# Patient Record
Sex: FEMALE | Race: WHITE | NOT HISPANIC OR LATINO | Employment: FULL TIME | URBAN - METROPOLITAN AREA
[De-identification: names, ages, dates, MRNs, and addresses within clinical notes are randomized per-mention and may not be internally consistent; named-entity substitution may affect disease eponyms.]

---

## 2017-03-09 LAB
CHOLEST SERPL-MCNC: 196 MG/DL
HDLC SERPL-MCNC: 69 MG/DL (ref 35–60)
LDLC SERPL CALC-MCNC: 108 MG/DL
TRIGL SERPL-MCNC: 47 MG/DL (ref 30–150)

## 2017-09-06 LAB
BUN SERPL-MCNC: 10 MG/DL (ref 7–18)
CALCIUM SERPL-MCNC: 9.6 MG/DL (ref 9–10.9)
CHLORIDE SERPL-SCNC: 108 MMOL/L (ref 98–116)
CO2 SERPL-SCNC: 26 MMOL/L (ref 15–28)
CREAT SERPL-MCNC: 0.72 MG/DL (ref 0.6–1.3)
GLUCOSE SERPL-MCNC: 98 MG/DL (ref 74–106)
POTASSIUM SERPL-SCNC: 4.9 MMOL/L (ref 3.5–5.1)
SODIUM SERPL-SCNC: 141 MEQ/L (ref 131–145)

## 2018-03-12 ENCOUNTER — HISTORICAL (OUTPATIENT)
Dept: LAB | Facility: HOSPITAL | Age: 58
End: 2018-03-12

## 2018-03-12 ENCOUNTER — HISTORICAL (OUTPATIENT)
Dept: ADMINISTRATIVE | Facility: HOSPITAL | Age: 58
End: 2018-03-12

## 2018-03-12 LAB
ABS NEUT (OLG): 3.1
ALBUMIN SERPL-MCNC: 4.6 GM/DL (ref 3.4–5)
ALBUMIN/GLOB SERPL: 2.27 {RATIO} (ref 1.5–2.5)
ALP SERPL-CCNC: 56 UNIT/L (ref 38–126)
ALT SERPL-CCNC: 13 UNIT/L (ref 7–52)
APPEARANCE, UA: NORMAL
AST SERPL-CCNC: 19 UNIT/L (ref 15–37)
BACTERIA #/AREA URNS AUTO: NORMAL /HPF
BILIRUB SERPL-MCNC: 0.6 MG/DL (ref 0.2–1)
BILIRUB UR QL STRIP: NEGATIVE MG/DL
BILIRUBIN DIRECT+TOT PNL SERPL-MCNC: <0.2 MG/DL (ref 0–0.5)
BUN SERPL-MCNC: 13 MG/DL (ref 7–18)
CALCIUM SERPL-MCNC: 9 MG/DL (ref 8.5–10)
CHLORIDE SERPL-SCNC: 106 MMOL/L (ref 98–107)
CHOLEST SERPL-MCNC: 207 MG/DL (ref 0–200)
CHOLEST/HDLC SERPL: 3.2 {RATIO}
CO2 SERPL-SCNC: 28 MMOL/L (ref 21–32)
COLOR UR: YELLOW
CREAT SERPL-MCNC: 0.85 MG/DL (ref 0.6–1.3)
CREAT/UREA NIT SERPL: 15.3
ERYTHROCYTE [DISTWIDTH] IN BLOOD BY AUTOMATED COUNT: 13.2 % (ref 11.5–17)
GGT SERPL-CCNC: 12 UNIT/L (ref 5–85)
GLOBULIN SER-MCNC: 2 GM/DL (ref 1.2–3)
GLUCOSE (UA): NEGATIVE MG/DL
GLUCOSE SERPL-MCNC: 92 MG/DL (ref 74–106)
HCT VFR BLD AUTO: 41.9 % (ref 37–47)
HCV AB SERPL QL IA: NEGATIVE
HDLC SERPL-MCNC: 65 MG/DL (ref 35–60)
HGB BLD-MCNC: 14.1 GM/DL (ref 12–16)
HGB UR QL STRIP: NEGATIVE UNIT/L
KETONES UR QL STRIP: NEGATIVE MG/DL
LDH SERPL-CCNC: 113 UNIT/L (ref 140–271)
LDLC SERPL CALC-MCNC: 115 MG/DL (ref 0–129)
LEUKOCYTE ESTERASE UR QL STRIP: NEGATIVE UNIT/L
LYMPHOCYTES # BLD AUTO: 1.9 X10(3)/MCL (ref 0.6–3.4)
LYMPHOCYTES NFR BLD AUTO: 33.8 % (ref 13–40)
MCH RBC QN AUTO: 31.3 PG (ref 27–31.2)
MCHC RBC AUTO-ENTMCNC: 34 GM/DL (ref 32–36)
MCV RBC AUTO: 93 FL (ref 80–94)
MONOCYTES # BLD AUTO: 0.5 X10(3)/MCL (ref 0–1.8)
MONOCYTES NFR BLD AUTO: 8.7 % (ref 0.1–24)
NEUTROPHILS NFR BLD AUTO: 57.5 % (ref 47–80)
NITRITE UR QL STRIP.AUTO: NEGATIVE
PH UR STRIP: 5 [PH]
PLATELET # BLD AUTO: 279 X10(3)/MCL (ref 130–400)
PMV BLD AUTO: 9.1 FL
POTASSIUM SERPL-SCNC: 4.5 MMOL/L (ref 3.5–5.1)
PROT SERPL-MCNC: 6.7 GM/DL (ref 6.4–8.2)
PROT UR QL STRIP: NEGATIVE MG/DL
RBC # BLD AUTO: 4.5 X10(6)/MCL (ref 4.2–5.4)
RBC #/AREA URNS HPF: NORMAL /HPF
SODIUM SERPL-SCNC: 140 MMOL/L (ref 136–145)
SP GR UR STRIP: 1.01
SQUAMOUS EPITHELIAL, UA: NORMAL /LPF
TRIGL SERPL-MCNC: 61 MG/DL (ref 30–150)
TSH SERPL-ACNC: 0.93 MIU/ML (ref 0.35–4.94)
UROBILINOGEN UR STRIP-ACNC: 0.2 MG/DL
VLDLC SERPL CALC-MCNC: 12.2 MG/DL
WBC # SPEC AUTO: 5.5 X10(3)/MCL (ref 4.5–11.5)
WBC #/AREA URNS AUTO: NORMAL /[HPF]

## 2018-05-14 ENCOUNTER — TELEPHONE (OUTPATIENT)
Dept: TRANSPLANT | Facility: CLINIC | Age: 58
End: 2018-05-14

## 2018-05-14 NOTE — TELEPHONE ENCOUNTER
Thea Pedrito called and stated that she is interested in becoming a living donor for her nephew, Figueroa Lomeli.  Medical and social history obtained.  No contraindications noted.  All questions answered. HLA kit requested and education book emailed to patient for review.

## 2018-05-24 ENCOUNTER — TELEPHONE (OUTPATIENT)
Dept: TRANSPLANT | Facility: CLINIC | Age: 58
End: 2018-05-24

## 2018-05-24 DIAGNOSIS — Z00.5 TRANSPLANT DONOR EVALUATION: Primary | ICD-10-CM

## 2018-05-24 NOTE — TELEPHONE ENCOUNTER
HLA kit received, instructions provided. Patient will contact her doctor's office and let me know when she will have her blood drawn.

## 2018-05-30 ENCOUNTER — LAB VISIT (OUTPATIENT)
Dept: LAB | Facility: HOSPITAL | Age: 58
End: 2018-05-30
Payer: OTHER GOVERNMENT

## 2018-05-30 DIAGNOSIS — Z00.5 TRANSPLANT DONOR EVALUATION: ICD-10-CM

## 2018-05-30 LAB
ABO GROUP BLD: NORMAL
RH BLD: NORMAL

## 2018-05-30 PROCEDURE — 81373 HLA I TYPING 1 LOCUS LR: CPT | Mod: 91,PO,TXP

## 2018-05-30 PROCEDURE — 81373 HLA I TYPING 1 LOCUS LR: CPT | Mod: PO,TXP

## 2018-05-30 PROCEDURE — 81376 HLA II TYPING 1 LOCUS LR: CPT | Mod: 91,PO,TXP

## 2018-05-30 PROCEDURE — 36415 COLL VENOUS BLD VENIPUNCTURE: CPT | Mod: TXP

## 2018-05-30 PROCEDURE — 81376 HLA II TYPING 1 LOCUS LR: CPT | Mod: 59,PO,TXP

## 2018-05-30 PROCEDURE — 86901 BLOOD TYPING SEROLOGIC RH(D): CPT | Mod: TXP

## 2018-05-30 PROCEDURE — 86900 BLOOD TYPING SEROLOGIC ABO: CPT | Mod: 91,TXP

## 2018-05-30 PROCEDURE — 86901 BLOOD TYPING SEROLOGIC RH(D): CPT | Mod: 91,TXP

## 2018-06-01 LAB — HLATY INTERPRETATION: NORMAL

## 2018-06-06 ENCOUNTER — TELEPHONE (OUTPATIENT)
Dept: TRANSPLANT | Facility: CLINIC | Age: 58
End: 2018-06-06

## 2018-06-06 DIAGNOSIS — Z00.5 TRANSPLANT DONOR EVALUATION: Primary | ICD-10-CM

## 2018-06-06 LAB
HLA DRB4 1: NORMAL
HLA SSO DNA TYPING CLASS I & II INTERPRETATION: NORMAL
HLA-A 1 SERO. EQUIV: 29
HLA-A 1: NORMAL
HLA-A 2 SERO. EQUIV: 31
HLA-A 2: NORMAL
HLA-B 1 SERO. EQUIV: 44
HLA-B 1: NORMAL
HLA-B 2 SERO. EQUIV: 55
HLA-B 2: NORMAL
HLA-BW 1 SERO. EQUIV: 4
HLA-BW 2 SERO. EQUIV: 6
HLA-C 1: NORMAL
HLA-C 2: NORMAL
HLA-CW 1 SERO. EQUIV: 1
HLA-CW 2 SERO. EQUIV: 16
HLA-DQ 1 SERO. EQUIV: 2
HLA-DQ 2 SERO. EQUIV: 6
HLA-DQB1 1: NORMAL
HLA-DQB1 2: NORMAL
HLA-DRB1 1 SERO. EQUIV: 7
HLA-DRB1 1: NORMAL
HLA-DRB1 2 SERO. EQUIV: 13
HLA-DRB1 2: NORMAL
HLA-DRB3 1: NORMAL
HLA-DRB3 2: NORMAL
HLA-DRB345 1 SERO. EQUIV: 52
HLA-DRB345 2 SERO. EQUIV: 53
HLA-DRB4 2: NORMAL
HLA-DRB5 1: NORMAL
HLA-DRB5 2: NORMAL
SSDQB TESTING DATE: NORMAL
SSOA TESTING DATE: NORMAL
TYSSO TESTING DATE: NORMAL

## 2018-06-06 NOTE — TELEPHONE ENCOUNTER
Patient notified that the result of the crossmatch with her nephew shows that they are compatible. Patient would like to proceed with the medical evaluation. Required testing discussed and information provided to schedule appointments.   Patient will have recent mammogram, pap smear, and physical exam faxed to us from her PCP. Understands that she will have to have a colonoscopy prior to scheduling surgery.   All questions were answered and patient verbalized understanding.

## 2018-06-26 ENCOUNTER — HOSPITAL ENCOUNTER (OUTPATIENT)
Dept: CARDIOLOGY | Facility: CLINIC | Age: 58
Discharge: HOME | End: 2018-06-26
Attending: NURSE PRACTITIONER
Payer: OTHER GOVERNMENT

## 2018-06-26 ENCOUNTER — HOSPITAL ENCOUNTER (OUTPATIENT)
Dept: RADIOLOGY | Facility: HOSPITAL | Age: 58
Discharge: HOME | End: 2018-06-26
Attending: NURSE PRACTITIONER
Payer: OTHER GOVERNMENT

## 2018-06-26 DIAGNOSIS — Z00.5 TRANSPLANT DONOR EVALUATION: ICD-10-CM

## 2018-06-26 LAB
DIASTOLIC DYSFUNCTION: NO
RETIRED EF AND QEF - SEE NOTES: 60 (ref 55–65)
TRICUSPID VALVE REGURGITATION: NORMAL

## 2018-06-26 PROCEDURE — A9562 TC99M MERTIATIDE: HCPCS | Mod: TXP

## 2018-06-26 PROCEDURE — 71046 X-RAY EXAM CHEST 2 VIEWS: CPT | Mod: 26,TXP,, | Performed by: RADIOLOGY

## 2018-06-26 PROCEDURE — 93321 DOPPLER ECHO F-UP/LMTD STD: CPT | Mod: 26,S$PBB,TXP, | Performed by: INTERNAL MEDICINE

## 2018-06-26 PROCEDURE — 78707 K FLOW/FUNCT IMAGE W/O DRUG: CPT | Mod: 26,TXP,, | Performed by: RADIOLOGY

## 2018-06-26 PROCEDURE — 71046 X-RAY EXAM CHEST 2 VIEWS: CPT | Mod: TC,FY,TXP

## 2018-06-26 PROCEDURE — 93351 STRESS TTE COMPLETE: CPT | Mod: PBBFAC,TXP | Performed by: INTERNAL MEDICINE

## 2018-06-27 ENCOUNTER — HOSPITAL ENCOUNTER (OUTPATIENT)
Dept: RADIOLOGY | Facility: HOSPITAL | Age: 58
Discharge: HOME | End: 2018-06-27
Attending: INTERNAL MEDICINE
Payer: OTHER GOVERNMENT

## 2018-06-27 ENCOUNTER — HOSPITAL ENCOUNTER (OUTPATIENT)
Dept: RADIOLOGY | Facility: HOSPITAL | Age: 58
Discharge: HOME | End: 2018-06-27
Attending: TRANSPLANT SURGERY
Payer: OTHER GOVERNMENT

## 2018-06-27 ENCOUNTER — OFFICE VISIT (OUTPATIENT)
Dept: TRANSPLANT | Facility: CLINIC | Age: 58
End: 2018-06-27
Payer: OTHER GOVERNMENT

## 2018-06-27 VITALS
DIASTOLIC BLOOD PRESSURE: 65 MMHG | HEART RATE: 68 BPM | SYSTOLIC BLOOD PRESSURE: 117 MMHG | OXYGEN SATURATION: 99 % | RESPIRATION RATE: 16 BRPM | BODY MASS INDEX: 23.29 KG/M2 | HEIGHT: 64 IN | TEMPERATURE: 98 F | WEIGHT: 136.44 LBS

## 2018-06-27 DIAGNOSIS — F41.9 ANXIETY: ICD-10-CM

## 2018-06-27 DIAGNOSIS — Z00.5 TRANSPLANT DONOR EVALUATION: ICD-10-CM

## 2018-06-27 DIAGNOSIS — I10 ESSENTIAL HYPERTENSION: ICD-10-CM

## 2018-06-27 DIAGNOSIS — Z91.09 ENVIRONMENTAL ALLERGIES: ICD-10-CM

## 2018-06-27 DIAGNOSIS — Z00.5 WILLING TO BE KIDNEY DONOR: Primary | ICD-10-CM

## 2018-06-27 DIAGNOSIS — F19.90 CURRENT DRUG USE: ICD-10-CM

## 2018-06-27 PROCEDURE — 74174 CTA ABD&PLVS W/CONTRAST: CPT | Mod: TC,TXP

## 2018-06-27 PROCEDURE — 97802 MEDICAL NUTRITION INDIV IN: CPT | Mod: PBBFAC,TXP | Performed by: DIETITIAN, REGISTERED

## 2018-06-27 PROCEDURE — 99215 OFFICE O/P EST HI 40 MIN: CPT | Mod: S$PBB,TXP,, | Performed by: NURSE PRACTITIONER

## 2018-06-27 PROCEDURE — 71250 CT THORAX DX C-: CPT | Mod: TC,TXP

## 2018-06-27 PROCEDURE — 71250 CT THORAX DX C-: CPT | Mod: 26,TXP,, | Performed by: RADIOLOGY

## 2018-06-27 PROCEDURE — 74174 CTA ABD&PLVS W/CONTRAST: CPT | Mod: 26,TXP,, | Performed by: RADIOLOGY

## 2018-06-27 PROCEDURE — 99999 PR PBB SHADOW E&M-EST. PATIENT-LVL IV: CPT | Mod: PBBFAC,TXP,, | Performed by: NURSE PRACTITIONER

## 2018-06-27 PROCEDURE — 99214 OFFICE O/P EST MOD 30 MIN: CPT | Mod: PBBFAC,25,TXP | Performed by: NURSE PRACTITIONER

## 2018-06-27 PROCEDURE — 25500020 PHARM REV CODE 255: Mod: TXP | Performed by: TRANSPLANT SURGERY

## 2018-06-27 PROCEDURE — 99204 OFFICE O/P NEW MOD 45 MIN: CPT | Mod: S$PBB,TXP,, | Performed by: TRANSPLANT SURGERY

## 2018-06-27 RX ORDER — BUPROPION HYDROCHLORIDE 150 MG/1
150 TABLET ORAL NIGHTLY
COMMUNITY
End: 2023-04-27 | Stop reason: ALTCHOICE

## 2018-06-27 RX ORDER — MONTELUKAST SODIUM 10 MG/1
10 TABLET ORAL NIGHTLY
COMMUNITY
Start: 2015-02-24 | End: 2023-04-24 | Stop reason: SDUPTHER

## 2018-06-27 RX ORDER — AMLODIPINE BESYLATE 10 MG/1
10 TABLET ORAL NIGHTLY
COMMUNITY
Start: 2015-02-24 | End: 2022-10-25

## 2018-06-27 RX ORDER — ASPIRIN 81 MG/1
81 TABLET ORAL DAILY
COMMUNITY
End: 2018-11-27

## 2018-06-27 RX ORDER — LEVOCETIRIZINE DIHYDROCHLORIDE 5 MG/1
5 TABLET, FILM COATED ORAL NIGHTLY
COMMUNITY
End: 2023-04-24 | Stop reason: SDUPTHER

## 2018-06-27 RX ORDER — ALPRAZOLAM 0.5 MG/1
0.25 TABLET ORAL NIGHTLY PRN
COMMUNITY
End: 2022-10-25

## 2018-06-27 RX ADMIN — IOHEXOL 150 ML: 350 INJECTION, SOLUTION INTRAVENOUS at 01:06

## 2018-06-27 NOTE — PROGRESS NOTES
"TRANSPLANT NUTRITIONAL ASSESSMENT    Referring Provider: Roxanna Domínguez NP    Reason for Visit: Potential kidney donor (hx of HTN-low sodium diet education)    Age: 58 y.o.  Sex: female    Patient Active Problem List   Diagnosis    Willing to be kidney donor    Essential hypertension    Environmental allergies    Anxiety     Past Medical History:   Diagnosis Date    Allergy     Anxiety     Hypertension 2010    Shingles      Lab Results   Component Value Date    GLU 91 06/26/2018    K 3.8 06/26/2018    PHOS 3.6 06/26/2018    CHOL 192 06/26/2018    HDL 78 (H) 06/26/2018    TRIG 62 06/26/2018    ALBUMIN 4.2 06/26/2018    HGBA1C 5.2 06/26/2018    CALCIUM 9.6 06/26/2018       Current Outpatient Prescriptions   Medication Sig    ALPRAZolam (XANAX) 0.5 MG tablet Take 0.25 mg by mouth daily as needed for Anxiety.    amLODIPine (NORVASC) 10 MG tablet Take 10 mg by mouth once daily.    aspirin (ECOTRIN) 81 MG EC tablet Take 81 mg by mouth once daily.    buPROPion (WELLBUTRIN XL) 150 MG TB24 tablet Take 150 mg by mouth every evening.    levocetirizine (XYZAL) 5 MG tablet Take 5 mg by mouth every evening.    montelukast (SINGULAIR) 10 mg tablet Take 10 mg by mouth once daily.     No current facility-administered medications for this visit.      Allergies: Patient has no known allergies.    Ht Readings from Last 1 Encounters:   06/27/18 5' 4.49" (1.638 m)     Wt Readings from Last 1 Encounters:   06/27/18 61.9 kg (136 lb 7.4 oz)      BMI: Body mass index is 23.07 kg/m².    Current Diet: low sodium diet, lots of fruits and vegetables  Appetite/Current Intake: good   Exercise/Physical Activity: does circuit training and kickboxing 3-4 times/week  Nutritional/Herbal Supplements: none reported  Chewing/Swallowing Problems: none reported  Symptoms: none    Estimated Kcal Need: 1525-1830kcals/day (25-30kcals/kg BW)  Estimated Protein Need: 61gms protein/day (1gm/kg BW)    Nutritional History: Pt reports she has been " following a low sodium diet since being diagnosed with HTN.  Reports consuming fruits, vegetables, and salads regularly. Limits intake of processed foods and added salt.  Reads food labels for sodium content.  Dining out: approx 3 times per month and tries choose healthy options.  Pt is very active and does circuit training and kickboxing 3-4 times per week.        Educational Need? yes  Barriers: none identified  Discussed with: patient  Interventions: Patient taught nutrition information regarding   -Reviewed Low Sodium packet (low Na diet, foods recommended/not recommended, food label strategies, flavoring tips, & sample menu).   Goals/Recommendations: continue diet adherence and current exercise habits  Actions Taken: instruct/provide written information  Patient and/or family comprehend instructions: yes  Outcome: Verbalizes understanding  Monitoring: contact information provided, will follow-up as needed      Counseling Time: 15 minutes

## 2018-06-27 NOTE — PROGRESS NOTES
Transplant Surgery Kidney Donor Evaluation     Referring Physician:     Subjective:     Chief Complaint: Thea Major is a 58 y.o. year old female who presents today wishing to be evaluated as a potential living related donor for her nephew.    History of Present Illness:  Thea reports being here without coercion, payment, guilt, or other alternative motives other than wanting to help someone with kidney disease.    Review of Systems   Constitutional: Negative for activity change and chills.   HENT: Negative for congestion and sore throat.    Eyes: Negative for discharge and redness.   Respiratory: Negative for cough and shortness of breath.    Cardiovascular: Negative for chest pain and palpitations.   Gastrointestinal: Negative for nausea and vomiting.   Endocrine: Negative for polydipsia and polyuria.   Genitourinary: Negative for dysuria and frequency.   Musculoskeletal: Negative for arthralgias and gait problem.   Skin: Negative for pallor and rash.   Neurological: Negative for dizziness and light-headedness.   Hematological: Negative for adenopathy. Does not bruise/bleed easily.   Psychiatric/Behavioral: Negative for agitation and suicidal ideas.       Objective:   Physical Exam   Constitutional: She is oriented to person, place, and time. She appears well-developed and well-nourished. No distress.   Neck: Normal range of motion. Neck supple. No JVD present.   Cardiovascular: Normal rate and intact distal pulses.    Pulmonary/Chest: Effort normal. No respiratory distress.   Abdominal: Soft. She exhibits no distension and no mass. There is no tenderness. There is no rebound and no guarding.   Musculoskeletal: She exhibits no edema.   Neurological: She is alert and oriented to person, place, and time.   Skin: Skin is warm and dry. She is not diaphoretic.   Psychiatric: She has a normal mood and affect.     ABO type: O POS    Diagnoses:  No diagnosis found.         Transplant Surgery - Candidacy    Assessment/Plan:     Donor Candidacy: Based on information available thus far, Thea is a suitable candidate for living kidney donation.    Nadir Yeh MD       Education: I discussed with the patient the requirements for donation including the compatibility of blood and tissue typing, healthy by physical examination and workup, as well as the desire to donate.  We discussed the risks related to surgery including the risks related to anesthesia, bleeding, infection, inability for surgery to be performed laparoscopically, risks of reoperation as well as the risks of death.  We discussed the length of hospitalization, return to work times, as well as follow-up post-donation.    I also discussed the slight possibility that due to problems with the recipient operation, the transplant might not be able to be completed after the organ was already removed. If such a situation should arise, the donor prefers that the organ be transplanted into a suitable third-party recipient.    I discussed the possibility that living donor sometimes encounter problems obtaining health insurance, or could have higher premium despite ongoing efforts of transplant professionals to educate insurance companies on this issue.    I discussed with Thea that donation is a voluntary activity, and reiterated it should be done willingly and for altruistic reasons only.  I reviewed that no payment should be made for donating.  I also discussed that coersion, guilt, pressure, or feelings of obligation are not appropriate reasons to donate.  The option to withdraw at any time was emphasized.  Thea was reminded that a medical opt out can be given to protect her confidentiality, and no member of the transplant team will discuss specifics of her health or medical/social history with anyone else without permission.  The need for lifelong routine medical follow-up for optimal health, including routine health maintenance was reviewed.    Additionally, I  discussed the need for our program to be able to contact living donors for UNOS reporting purposes for a minimum of 2 years.  Failure of our center to be able to provide such information could jeopardize our ability to continue to offer living donor transplants.  Thea voices understanding and agrees to this follow-up.    I discussed the UNOS requirement for centers to report donor status for a minimum of two years. Thea understands that failure to comply with requirement could have adverse consequences for our transplant program, and agrees to cooperate with all our required follow-up.    I reviewed with Thea available lab results and other diagnostics from the evaluation process.

## 2018-06-27 NOTE — PROGRESS NOTES
PHARM.D. PRE-TRANSPLANT DONOR NOTE:    This patient's medication therapy was evaluated as part of her pre-transplant evaluation.    The following pharmacologic concerns were noted: none      Current Outpatient Prescriptions   Medication Sig Dispense Refill    ALPRAZolam (XANAX) 0.5 MG tablet Take 0.25 mg by mouth daily as needed for Anxiety.      amLODIPine (NORVASC) 10 MG tablet Take 10 mg by mouth once daily.      aspirin (ECOTRIN) 81 MG EC tablet Take 81 mg by mouth once daily.      buPROPion (WELLBUTRIN XL) 150 MG TB24 tablet Take 150 mg by mouth every evening.      levocetirizine (XYZAL) 5 MG tablet Take 5 mg by mouth every evening.      montelukast (SINGULAIR) 10 mg tablet Take 10 mg by mouth once daily.       No current facility-administered medications for this visit.          Currently she is responsible for preparing / administering this patient's medications on a daily basis.  I am available for consultation and can be contacted, as needed by the other members of the Kidney Transplant team.

## 2018-06-27 NOTE — PROGRESS NOTES
"DONOR TEACHING NOTE    Met with Thea Major today in clinic to review living donor education.        Topics covered included:  · Kidney donation is done voluntarily and of the donor's free will.  Process can stop at any time.   · Better success rates than cadaveric donation, shorter waiting time for the recipient: less than the 3-5 year wait on the list, more time to prepare: tests/surgery can be planned  · Laboratory studies of blood and urine.  Health exams: records of GYN/pap/mammogram (females); evaluation by transplant team and a psychiatrist (if indicated); diagnostic Tests: CXR, EKG, TB skin test, 24-hour urine collection, renal scan, CT of abdomen to assess kidney anatomy, and stress test (if indicated)  · Team will review workup for approval.  Copy of the approved criteria for donation given to patient.  Surgeon will decide which kidney will be donated.   · Benefits of laparoscopic nephrectomy: less pain, shorter hospital stay, a shorter recovery period.  Risks discussed: bleeding, deep vein thrombosis, pulmonary embolus, the need for re-operation.  Your operation may be converted to an "open" procedure if the surgeon feels it is medically necessary.  · To recovery room, transfer to TSU, if space allows or semi-private room.  Lord catheter/IV, average hospital stay is 1 day.  At discharge the cost of any prescriptions is the donor's responsibility.  · Post-operative visit 4 weeks after surgery or prior to returning to work, unless a complication arises and you need to be seen sooner.  Off of work for about 3 to 6 weeks, no driving for at least the first 3 weeks.  General surgical complications: infection, allergic reaction, and anesthesia.   · Long life considerations of living with one kidney: risk of HTN and kidney failure   · Following up with PCP 6 months after donation then yearly thereafter to monitor kidney function.  This should include weight, labs, urinalysis, and a blood pressure check.  We " are required to report your progress to UNOS at 6 months, 1 year, and 2 years post-donation.     Written educational material provided. Informed consent reviewed and signed. All questions were answered and patient verbalized understanding.     Patient is a suitable candidate for living kidney donation.

## 2018-06-27 NOTE — Clinical Note
June 28, 2018                     [address] [facility]  [address]  [facility] [address]  Phone: [phone]   Patient: Thea Major   MR Number: [id number]   YOB: 1960   Date of Visit: 6/27/2018       Dear      Thank you for referring Thea Major to me for evaluation. Attached you will find relevant portions of my assessment and plan of care.    If you have questions, please do not hesitate to call me. I look forward to following Thea Major along with you.    Sincerely,    Roxanna Domínguez, NP    Enclosure    If you would like to receive this communication electronically, please contact [email or web address] or [phone] to request [employer] access.    [employer] Link is a tool which provides read-only access to select patient information with whom you have a relationship. Its easy to use and provides real time access to review your patients record including encounter summaries, notes, results, and demographic information.    If you feel you have received this communication in error or would no longer like to receive these types of communications, please e-mail [email or web address]

## 2018-06-27 NOTE — PROGRESS NOTES
"   Kidney Transplant Donor Evaluation    Subjective:       CC:  Initial evaluation of kidney donor candidacy.    HPI:  Ms. Major is a 58 y.o. year old Unknown female who has presented to be evaluated as a potential living related donor for her nephew.  Ms. Major reports being here without coercion, payment, guilt or other alternative motives other than wanting to help someone with kidney disease.    Patient denies any history of coronary artery disease, stroke, seizure disorder, chronic obstructive pulmonary disease, liver disease, kidney stones, gallstones, deep venous thrombosis, pulmonary embolism, recurrent urinary tract infections or malignancies.      Pt reports occasional marijuana use , last use 2018  Has been on Wellbutrin since she quit smoking 2016. Takes Xanax 0.5 mg nightly    Breast Biopsy in  --benign, Gets yearly MMGs.     Takes ASA 81 mg daily per PCP  Discussed avoiding NSAID use post nephrectomy     Kick boxing and circuit training 3-4 x/week  Works full time    [employer]  Miscarriage  with her first and only  pregnancy, and had a D&C.      Past Medical History:   Diagnosis Date    Allergy     Anxiety     Hypertension 2010    Shingles       Past Surgical History:   Procedure Laterality Date    BREAST BIOPSY      benign    DILATION AND CURETTAGE OF UTERUS      TUBAL LIGATION          Social History   Substance Use Topics    Smoking status: Former Smoker     Packs/day: 1.00     Years: 30.00     Types: Cigarettes     Quit date: 2016    Smokeless tobacco: Never Used    Alcohol use Yes      Comment: 2 x/ month     Family History   Problem Relation Age of Onset    COPD Father     Lung disease Father     Tuberculosis Father     Aneurysm Mother      /80 (BP Location: Right arm, Patient Position: Sitting, BP Method: Medium (Automatic))   Pulse 87   Temp 98.1 °F (36.7 °C) (Oral)   Resp 16   Ht 5' 4.49" (1.638 m)   Wt 61.9 kg (136 lb 7.4 oz)   SpO2 " 99%   BMI 23.07 kg/m²     Review of Systems   Constitutional: Negative for activity change, appetite change, chills, fatigue, fever and unexpected weight change.   HENT: Negative for congestion, facial swelling, postnasal drip, rhinorrhea, sinus pressure, sore throat and trouble swallowing.    Eyes: Positive for visual disturbance. Negative for pain and redness.        Wears glasses   Respiratory: Negative for cough, chest tightness, shortness of breath and wheezing.    Cardiovascular: Negative.  Negative for chest pain, palpitations and leg swelling.   Gastrointestinal: Negative for abdominal pain, diarrhea, nausea and vomiting.   Genitourinary: Negative for dysuria, flank pain and urgency.   Musculoskeletal: Negative for gait problem, neck pain and neck stiffness.   Skin: Negative for rash.   Allergic/Immunologic: Positive for environmental allergies. Negative for food allergies and immunocompromised state.   Neurological: Negative for dizziness, weakness, light-headedness and headaches.   Psychiatric/Behavioral: Negative for agitation and confusion. The patient is nervous/anxious.         Controlled       Objective:   Physical Exam   Constitutional: She is oriented to person, place, and time. She appears well-developed and well-nourished.   HENT:   Head: Normocephalic.   Mouth/Throat: Oropharynx is clear and moist. No oropharyngeal exudate.   Eyes: Conjunctivae and EOM are normal. Pupils are equal, round, and reactive to light. No scleral icterus.   Neck: Normal range of motion. Neck supple.   Cardiovascular: Normal rate, regular rhythm and normal heart sounds.    Pulmonary/Chest: Effort normal and breath sounds normal.   Abdominal: Soft. Normal appearance and bowel sounds are normal. She exhibits no distension and no mass. There is no splenomegaly or hepatomegaly. There is no tenderness. There is no rebound, no guarding, no CVA tenderness, no tenderness at McBurney's point and negative Garber's sign.    Musculoskeletal: Normal range of motion. She exhibits no edema.   Lymphadenopathy:     She has no cervical adenopathy.   Neurological: She is alert and oriented to person, place, and time. She exhibits normal muscle tone. Coordination normal.   Skin: Skin is warm and dry.   Psychiatric: She has a normal mood and affect. Her behavior is normal.   Vitals reviewed.      Labs:  6/26/2018: Creatinine 0.8 mg/dL (Ref range: 0.5 - 1.4 mg/dL); Creatinine, Random Ur 78.0 mg/dL (Ref range: 15.0 - 325.0 mg/dL); Urine, Creatinine 44.0 mg/dL (Ref range: 15.0 - 325.0 mg/dL); BUN, Bld 14 mg/dL (Ref range: 6 - 20 mg/dL)  6/26/2018: Nitrite, UA Negative (Ref range: Negative)  ABO type: O POS    Assessment:     1. Willing to be kidney donor    2. Essential hypertension    3. Environmental allergies    4. Anxiety    5. Current drug use        Plan:   Body mass index is 23.07 kg/m².  HTN controlled    Continue smoking cessation  + marijuana use--encouraged cessation of use at least  8 weeks p/t nephrectomy  Plasma toxicology testing today and random , as well as,  and nicotine testing      Chest CT ordered today -->per CXR , Lungs a clear which could be slightly emphysematous.       Donor Candidacy:   Based on the given information, Ms. Major appears to be a suitable candidate for kidney donation.  A final recommendation will be made by the selection committee after reviewing her complete workup.    Roxanna Domínguez NP       Counseling:   I discussed with Ms. Major that donation is voluntary and reiterated it should be done willingly and for altruistic reasons only.  I reviewed that no payment should be received for donating.  I also discussed that coercion, guilt, pressure, or feelings of obligation are not appropriate reasons to donate.  The option to withdraw at any time was emphasized.  Ms. Major was reminded that a medical opt out can be given to protect her confidentiality, and no member of the transplant team will  discuss specifics of her health or medical/social history with anyone else without permission.  The need for lifelong routine medical follow-up for optimal health, including routine health maintenance was reviewed.    We also discussed the long term risks associated with kidney donation.  I told the patient that her GFR should return to within 75-80% of pre-donation level within six months of donation.  I informed the patient that there is a small risk of developing albuminuria and hypertension following donor nephrectomy.  I also informed the patient that based on current literature, the risk of developing end-stage renal disease following donor nephrectomy is similar to the general population.    I reviewed with Ms. Major available lab results and other diagnostics from the evaluation process    Additional Counseling:   The patient was counseled on the need for regular follow-up with a primary care physician for blood pressure and cholesterol screening.  The importance of age appropriate health screening was also emphasized.    Follow-up:    prn    Altogether, 30  minutes of this encounter were spent on counseling, which was greater than 50% of the total visit time.

## 2018-06-27 NOTE — PROGRESS NOTES
TRANSPLANT DONOR PSYCHOSOCIAL ASSESSMENT    Thea Major  [address]  [place] [address]    Telephone Information:   Mobile [phone]     Home There is no home phone number on file.  Work  There is no work phone number on file.  E-mail  No e-mail address on record    PHS Increased Risk Behavioral Questionnaire reviewed by : Yes   addressed any PHS increased risk behaviors or concerns. Resources and education provided as appropriate.    Sex: female  YOB: 1960  Age: 58 y.o.    Encounter Date: 6/27/2018  [place] Citizen: yes  Primary Language: English   Needed: no    Potential Recipient: Lucius Ontiveros   Clinic Number: [id number]  Donor's Relationship to Patient: nephew    Emergency Contact:  Name: Rosalinda Major   Relationship: sister  Address: [address]  Phone Numbers:   [phone] (mobile)    Family/Social Support:   Number of dependents/: pt reports no dependents   Marital history: pt reports 1 previous marriage, ending in divorce and denies current relationship  Other family dynamics: pt reports     Household Composition:  Pt lives with a roommate      Do you and your caregivers have access to reliable transportation? yes  PRIMARY CAREGIVER: Rosalinda Major will be primary caregiver, phone number [phone].      provided in-depth information to patient and caregiver regarding pre- and post-transplant caregiver role.   strongly encourages patient and caregiver to have concrete plan regarding post-transplant care giving, including back-up caregiver(s) to ensure care giving needs are met as needed.    Patient and Caregiver states understanding all aspects of caregiver role/commitment and is able/willing/committed to being caregiver to the fullest extent necessary.    Patient and Caregiver verbalizes understanding of the education provided today and caregiver responsibilities.         remains  available. Patient and Caregiver agree to contact  in a timely manner if concerns arise.      Able to take time off work without financial concerns: yes.       Pt's caregiver exited room at this time.     Additional Significant Others who will Assist with Transplant:  Name: Luz Adkins   Age: 48  Number: [phone]  City: [facility] [place]: [place]  Relationship: sister  Does person drive? yes    Name: Ender Arroyo  Age: 43  City: [place] State: [place]  Relationship: roommate  Does person drive? yes    Living Will: no  Healthcare Power of : no Pt reports trusting sisters with medical decisions   Advance Directives on file: <no information> per medical record.  Verbally reviewed LW/HCPA information.   provided patient with copy of LW/HCPA documents and provided education on completion of forms.    Education: high school  Reading Ability: 12th grade  Reports difficulty with: seeing and utilizes glassess  Learns [employer]:  Hands on instruction      Status: no  VA Benefits: no     Employment:  Pt acts a an  with [employer]   Fundraising and [employer]LD[employer] information provided to patient.  Patient verbalizes understanding.   remains available.    Spouse/Significant Other Employment: pt reports no s/o     Insurance: see potential recipient's insurance for donor coverage.    Does the donor have health insurance? Yes    Patient verbalizes clear understanding that patient may experience difficulty obtaining and/or be denied insurance coverages post-surgery.  This includes and is not limited to disability insurance, life insurance, health insurance, burial insurance, long term care insurance, and other insurances.  Patient also reports understanding that future health concerns related to or unrelated to transplantation may not be covered by patient's insurance.  Resources and information provided and reviewed.      Patient provides verbal permission to release any  necessary information to outside resources for patient care and discharge planning.  Resources and information provided and reviewed.      Infusion Service: patient utilizing? no  Home Health: patient utilizing? no  DME: yes bpc-pt reports tracking bp via log   ADLS: Pt reports pt is independent with all ADLS including driving, bathing,walking,taking medications, cooking, housekeeping, eating, and shopping.                                                                                                                     Adherence:  Adherence education and counseling provided    Per History Section:  Past Medical History:   Diagnosis Date    Hypertension 2010     Social History   Substance Use Topics    Smoking status: Former Smoker     Packs/day: 1.00     Years: 30.00     Types: Cigarettes     Quit date: 2/26/2016    Smokeless tobacco: Never Used    Alcohol use Yes      Comment: 2 x/ month     History   Drug Use    Types: Marijuana     Comment: occaisonal     History   Sexual Activity    Sexual activity: Not on file       Pt did report utilizing marijuana 1-2 joints per week. Pt reports can go days without mariajuana and doesn't need it to cope.       Patient states clear understanding of the potential impact of substance use as it relates to donor candidacy and is agreeable to random substance screening.  Substance abstinence/cessation counseling, education and resources provided and reviewed.     Arrests/DWI/Treatment/Rehab: patient denies    Psychiatric History:    Mental Health: pt reports some anxiety associated with loosing parents and becoming the matriarch of the family.  Psychiatrist/Counselor: pt denies   Medications:  Pt reports utilizing xanax for anxiety and Wellbutrin for smoking cessation     Suicide/Homicide Issues: pt denies   Safety at home: pt denies     Donation Knowledge/Expectations: Patient states having clear understanding and realistic expectations regarding the potential risks and  "potential benefits of organ transplantation and organ donation and agrees to discuss with health care team members and support system members, as well as to utilize available resources and express questions and/or concerns in order to further facilitate the patients informed decision-making.  Resources and information provided and reviewed. .    Decision-making Process: Pt reports "well, this our kid. He is our first baby and this was not his fault". Pt proceeded to relay story of how potential recipient lost his kidney (due to a horrible accident).  Patient states understanding that transplant and donation are not a guarantee that the donated organ will function.  Patient states understanding of kidney treatment options available for kidney patients, psychosocial aspects surrounding organ donation and transplantation as well as recovery.  Patient also states clear understanding that patient may choose to not donate at any time prior to surgery taking place, and that patient confidentiality is protected.  Patient reports expected compliance with health care regime and states understanding of importance of compliance.  Educational information provided.    Patient reports having a clear understanding  that risks and benefits may be involved with organ transplantation and with organ donation and  of the treatment options available to a potential transplant recipient. Patient reports clear understanding that psychosocial risk factors which may affect patient, including but not limited to feelings of depression, generalized anxiety, anxiety regarding dependence on others, post traumatic stress disorder, feelings of guilt and other emotional and/or mental concerns, and/or exacerbation of existing mental health concerns.       Detailed resources and education provided and discussed. Patient agrees to access appropriate resources in a timely manner as needed and to communicate concerns appropriately.      Feelings or " Concerns: pt reports no concerns at this time and is anxious for transplant. Patient denies feelings of coercion, pressure or obligation to donate. Patient states that patient is not receiving any compensation for organ donation. Patient reports motivation to pursue organ donation at this time.     Patient reports having clear and realistic expectations and understanding of the many psychosocial aspects involved with being a living organ donor, including but not limited to costs, compliance, medications, lab work, procedures, appointments, financial planning, preparedness, timely and appropriate communication of concerns, abstinence from non-prescribed drugs or substances, importance of adherence to and follow-through with all health care team recommendations, participation in health care and treatment planning, utilization of resources and follow-through, mental health counseling as needed and/or recommended, and the patient and caregiver responsibilities.  Patient states having a clear understanding of possible difficulty obtaining or possibility of being denied insurance coverage post-surgery.  This includes and is not limited to disability insurance, life insurance, health insurance, burial insurance, long-term care insurance and other insurances.  Educational and resource information provided and reviewed.  Patient also reports understanding that future health concerns related to or unrelated to organ donation may not be covered by patients insurance.    Coping:  Pt reports kieran adequately with donation work up    Interview Behavior: Thea presents as alert and oriented x 4, pleasant, good eye contact, well groomed, recall good, concentration/judgement good, average intelligence, calm, communicative, cooperative and asking and answering questions appropriately.      Suitability for Donation: Thea Major presents as a suitable candidate for donation at this time.    Recommendations/Additional Comments: pt  is a highly suitable candidate for donation.

## 2018-06-29 ENCOUNTER — TELEPHONE (OUTPATIENT)
Dept: TRANSPLANT | Facility: CLINIC | Age: 58
End: 2018-06-29

## 2018-06-29 NOTE — TELEPHONE ENCOUNTER
Spoke with patient regarding results of testing. Notified that her hepatitis B antibody is positive, but this will not affect her ability to donate. CT of chest showed hypodensities in thyroid and changes related to emphysema, which will most likely require additional evaluation. Message sent to nephrologist to determine if additional testing is indicated. I will contact patient with plan. All questions were answered and patient verbalized understanding.

## 2018-07-03 ENCOUNTER — TELEPHONE (OUTPATIENT)
Dept: TRANSPLANT | Facility: CLINIC | Age: 58
End: 2018-07-03

## 2018-07-03 DIAGNOSIS — Z00.5 TRANSPLANT DONOR EVALUATION: Primary | ICD-10-CM

## 2018-07-26 ENCOUNTER — OFFICE VISIT (OUTPATIENT)
Dept: PULMONOLOGY | Facility: CLINIC | Age: 58
End: 2018-07-26
Payer: OTHER GOVERNMENT

## 2018-07-26 ENCOUNTER — PROCEDURE VISIT (OUTPATIENT)
Dept: PULMONOLOGY | Facility: CLINIC | Age: 58
End: 2018-07-26
Payer: COMMERCIAL

## 2018-07-26 VITALS
BODY MASS INDEX: 23.94 KG/M2 | RESPIRATION RATE: 18 BRPM | OXYGEN SATURATION: 97 % | SYSTOLIC BLOOD PRESSURE: 120 MMHG | HEIGHT: 65 IN | HEART RATE: 72 BPM | WEIGHT: 143.69 LBS | DIASTOLIC BLOOD PRESSURE: 68 MMHG

## 2018-07-26 DIAGNOSIS — Z00.5 TRANSPLANT DONOR EVALUATION: ICD-10-CM

## 2018-07-26 DIAGNOSIS — R91.1 LUNG NODULE: ICD-10-CM

## 2018-07-26 DIAGNOSIS — J43.9 PULMONARY EMPHYSEMA, UNSPECIFIED EMPHYSEMA TYPE: Primary | ICD-10-CM

## 2018-07-26 PROCEDURE — 94726 PLETHYSMOGRAPHY LUNG VOLUMES: CPT | Mod: PBBFAC,PO,TXP

## 2018-07-26 PROCEDURE — 94060 EVALUATION OF WHEEZING: CPT | Mod: PBBFAC,PO,TXP

## 2018-07-26 PROCEDURE — 99999 PR PBB SHADOW E&M-EST. PATIENT-LVL IV: CPT | Mod: PBBFAC,TXP,, | Performed by: NURSE PRACTITIONER

## 2018-07-26 PROCEDURE — 99214 OFFICE O/P EST MOD 30 MIN: CPT | Mod: PBBFAC,25,PO,TXP | Performed by: NURSE PRACTITIONER

## 2018-07-26 PROCEDURE — 99204 OFFICE O/P NEW MOD 45 MIN: CPT | Mod: S$PBB,,, | Performed by: NURSE PRACTITIONER

## 2018-07-26 PROCEDURE — 94726 PLETHYSMOGRAPHY LUNG VOLUMES: CPT | Mod: 26,S$PBB,TXP, | Performed by: INTERNAL MEDICINE

## 2018-07-26 PROCEDURE — 94060 EVALUATION OF WHEEZING: CPT | Mod: 26,S$PBB,TXP, | Performed by: INTERNAL MEDICINE

## 2018-07-26 PROCEDURE — 94729 DIFFUSING CAPACITY: CPT | Mod: 26,S$PBB,TXP, | Performed by: INTERNAL MEDICINE

## 2018-07-26 PROCEDURE — 94729 DIFFUSING CAPACITY: CPT | Mod: PBBFAC,PO,TXP

## 2018-07-26 NOTE — PROGRESS NOTES
"Subjective:      Patient ID: Thea Major is a 58 y.o. female.    Chief Complaint: COPD (kidney donor)    Patient presents as a consult for pulmonary evaluation.  Patient is anticipating to be a kidney donor for her nephew.  She had an abnormal CT scan.  Patient is a past smoker.  She quit smoking 2016, 30pk yr hx.   She denies shortness of breath. She states 3-4 times a week she does high-intensity circuit training with kick boxing.  She stays at 85% of max  heart rate for up to 30 minutes without difficulty.   No wheezing, no cough..           /68 (BP Location: Right arm, Patient Position: Sitting)   Pulse 72   Resp 18   Ht 5' 4.6" (1.641 m)   Wt 65.2 kg (143 lb 11.2 oz)   SpO2 97%   BMI 24.21 kg/m²   Body mass index is 24.21 kg/m².    Review of Systems   Respiratory:        See HPI   All other systems reviewed and are negative.    Objective:      Physical Exam   Constitutional: She is oriented to person, place, and time. She appears well-developed and well-nourished.   HENT:   Head: Normocephalic and atraumatic.   Mouth/Throat: Oropharynx is clear and moist. No oropharyngeal exudate.   Eyes: Right eye exhibits no discharge. Left eye exhibits no discharge.   Neck: Normal range of motion. Neck supple. No tracheal deviation present. No thyromegaly present.   Cardiovascular: Normal rate, regular rhythm and normal heart sounds.  Exam reveals no gallop and no friction rub.    No murmur heard.  Pulmonary/Chest: Effort normal and breath sounds normal. No stridor. No respiratory distress. She has no wheezes. She has no rales.   Abdominal: Soft. Bowel sounds are normal. She exhibits no distension and no mass. There is no tenderness.   Musculoskeletal: Normal range of motion. She exhibits no edema.   Lymphadenopathy:     She has no cervical adenopathy.   Neurological: She is alert and oriented to person, place, and time. Coordination normal.   Skin: Skin is warm and dry. No rash noted.   Psychiatric: She " has a normal mood and affect.     Personal Diagnostic Review      CTA Abdomen and Pelvis  Narrative: EXAMINATION:  CTA ABDOMEN AND PELVIS    CLINICAL HISTORY:  living kidney donor evaluation;  Encounter for examination of potential donor of organ and tissue    TECHNIQUE:  Procedure Comment: Noncontrast CT of the chest and kidney donor protocol CT without and with 150 cc Omni 350 IV contrast performed per request for this potential kidney donor.  3-D reformats in addition to multiplanar reformats were performed.    COMPARISON:  Chest radiograph 06/26/2018.    FINDINGS:  Base of Neck: The thyroid gland is normal size with heterogenous attenuation there is a focal 0.7 cm hypodensity in the right thyroid lobe and 0.8 cm focal hypodensity in the left thyroid lobe.    CHEST:    -Heart: Normal size. No pericardial effusion.  There is mild calcific atherosclerosis of the coronary arteries and aorta.    -Pulmonary vasculature: Pulmonary arteries distribute normally.  There are four pulmonary veins.    -Carmen/Mediastinum: No pathologic veronica enlargement.    -Trachea and Proximal airways: Patent.    -Lungs/Pleura: Lungs demonstrate severe centrilobular emphysematous changes.  There is a 0.6 cm solid pulmonary nodule in the right middle lobe (axial series 2, image 68).  There is minimal platelike atelectasis at both lung bases..    -Esophagus: Normal course and caliber.    Liver: Normal in size and attenuation, with no focal hepatic lesions.    Gallbladder: The gallbladder is nondistended.  There is minimal layering calcific density within the gallbladder without wall thickening or pericholecystic fluid to suggest acute cholecystitis..    Bile Ducts: No evidence of dilated ducts.    Pancreas: No mass or peripancreatic fat stranding.    Spleen: Unremarkable.    Adrenals: Unremarkable.    Reproductive organs: The uterus is nonvisualized and possibly surgically absent.  There is punctate high-density material in the bilateral  adnexal area which may represent postoperative change from tubal ligation or phleboliths.    GI Tract/Mesentery: No evidence of bowel obstruction or inflammation. The appendix is normal.    Peritoneal Space: No ascites. No free air.    Retroperitoneum:  No significant adenopathy.    Abdominal wall:  Unremarkable.    Vasculature: There is moderate calcific atherosclerosis of the aorta without aneurysm..    Bones: No acute fracture. Age-appropriate degenerative changes.    Renal Donor Parameters:    The kidneys are normal in shape, size, and location. There is no nephrolithiasis or obstructive uropathy.  The ureters are normal in course and caliber with no evidence of ureterolithiasis.  The bladder is unremarkable.    The right kidney measures 6.3 x 5.6 x 10.4 cm.  The main right renal artery bifurcates 3.3 cm from its origin.  There is an accessory right renal artery which supplies the inferior pole.  There is a single right renal vein which branches 4.0 cm from its insertion upon the IVC.  There is a single right collecting system.    The left kidney measures 7.0 x 5.4 x 11.0 cm.  The main left renal artery bifurcates 4.5 cm from its origin.  There is an accessory left renal artery supplying the inferior pole of the left kidney.  there is a single left renal vein which branches 8 cm from its insertion upon the IVC.  There is a single left collecting system.    There are no enhancing renal masses.  Impression: 1. Potential kidney donor with parameters as listed above.    2.  Severe centrilobular emphysema.    3.  Nonspecific 0.6 cm solid pulmonary nodule in the right middle lobe (axial series 2, image 68).  For a solid nodule 6-8 mm, Fleischner Society 2017 guidelines recommend follow up with non-contrast chest CT at 6-12 months and 18-24 months after discovery.    4. Moderate calcific atherosclerosis of the aorta.    5.  Cholelithiasis.    6.  Subcentimeter hypodense bilateral thyroid nodules.  This could be further  assessed with dedicated thyroid ultrasound, if clinically indicated.    7.  Additional findings as detailed above.    Electronically signed by resident: Jack Myers  Date:    06/27/2018  Time:    14:25    Electronically signed by: Nadir Shultz MD  Date:    06/27/2018  Time:    15:01      PFT:  % of predicted. FEV1 65 % of predicted.  % of predicted. DLCO 58% of predicted.    Assessment:       1. Pulmonary emphysema, unspecified emphysema type    2. Transplant donor evaluation        Outpatient Encounter Prescriptions as of 7/26/2018   Medication Sig Dispense Refill    ALPRAZolam (XANAX) 0.5 MG tablet Take 0.25 mg by mouth daily as needed for Anxiety.      amLODIPine (NORVASC) 10 MG tablet Take 10 mg by mouth once daily.      aspirin (ECOTRIN) 81 MG EC tablet Take 81 mg by mouth once daily.      buPROPion (WELLBUTRIN XL) 150 MG TB24 tablet Take 150 mg by mouth every evening.      levocetirizine (XYZAL) 5 MG tablet Take 5 mg by mouth every evening.      montelukast (SINGULAIR) 10 mg tablet Take 10 mg by mouth once daily.      umeclidinium-vilanterol (ANORO ELLIPTA) 62.5-25 mcg/actuation DsDv Inhale 1 puff into the lungs once daily. 30 each 11     No facility-administered encounter medications on file as of 7/26/2018.      Orders Placed This Encounter   Procedures    CT Chest Without Contrast     Standing Status:   Future     Standing Expiration Date:   7/26/2019     Order Specific Question:   May the Radiologist modify the order per protocol to meet the clinical needs of the patient?     Answer:   Yes    Spirometry with/without bronchodilator     Standing Status:   Future     Standing Expiration Date:   7/26/2019     Plan:   CT scan with nonspecific subcentimeter pulmonary nodule.  Discussed that this does not look suspicious, but up would like to continue follow-up.  CT scan in 6 months.  PFT with moderate obstruction, but patient is physically fit with high endurance without abnormal  dyspnea.  I do not anticipate any pulmonary complications for surgery.  Low risk surgical risk from a pulmonary standpoint.  To optimize and to preserve lung function, start Anoro daily. Follow up in 2 months with spirometry.

## 2018-07-26 NOTE — LETTER
July 26, 2018      Zeb Woody MD  [address]  [place] [address]           [facility]  [address] [facility] [address] [place] [address]  Phone: [phone]  Fax: [phone]          Patient: Thea Major   MR Number: [id number]   YOB: 1960   Date of Visit: 7/26/2018       Dear Dr. Zeb Woody:    Thank you for referring Thea Major to me for evaluation. Attached you will find relevant portions of my assessment and plan of care.    If you have questions, please do not hesitate to call me. I look forward to following Thea Major along with you.    Sincerely,    Elizabeth Lejeune, NP    Enclosure  CC:  No Recipients    If you would like to receive this communication electronically, please contact [email or web address] or [phone] to request more information on [employer] Link access.    For providers and/or their staff who would like to refer a patient to [facility], please contact us through our one-stop-shop provider referral line, [facility] , at [phone].    If you feel you have received this communication in error or would no longer like to receive these types of communications, please e-mail [email or web address]

## 2018-07-27 NOTE — PROGRESS NOTES
Will have the Pulmonary service evaluate the PFTs, I see a note from Ms Mckeon (NP with pulmonary service). I think she says patient is OK to proceed since their point of view

## 2018-07-29 LAB
BRPFT: ABNORMAL
DLCO ADJ PRE: 13.55 ML/(MIN*MMHG) (ref 17.95–29.42)
DLCO PRE: 13.83 ML/(MIN*MMHG) (ref 17.95–29.42)
DLCO SINGLE BREATH LLN: 17.95
DLCO SINGLE BREATH PRE REF: 58.4 %
DLCO SINGLE BREATH REF: 23.68
DLCOC SBVA LLN: 3.24
DLCOC SBVA PRE REF: 65 %
DLCOC SBVA REF: 4.7
DLCOC SINGLE BREATH LLN: 17.95
DLCOC SINGLE BREATH PRE REF: 57.2 %
DLCOC SINGLE BREATH REF: 23.68
DLCOVA LLN: 3.24
DLCOVA PRE REF: 66.4 %
DLCOVA PRE: 3.12 ML/(MIN*MMHG*L) (ref 3.24–6.17)
DLCOVA REF: 4.7
DLVAADJ PRE: 3.06 ML/(MIN*MMHG*L) (ref 3.24–6.17)
ERV LLN: 0.87
ERV PRE REF: 63.2 %
ERV PRE: 0.55 L (ref 0.87–0.87)
ERV REF: 0.87
ERVN2 LLN: 0.87
ERVN2 REF: 0.87
FEF 25 75 CHG: 15.1 %
FEF 25 75 LLN: 1.31
FEF 25 75 POST REF: 20.9 %
FEF 25 75 POST: 0.52 L/S (ref 1.31–3.63)
FEF 25 75 PRE REF: 18.2 %
FEF 25 75 PRE: 0.45 L/S (ref 1.31–3.63)
FEF 25 75 REF: 2.47
FET100 CHG: 5.9 %
FET100 POST: 15.57 SEC
FET100 PRE: 14.7 SEC
FEV1 CHG: 11.9 %
FEV1 FVC CHG: 8.2 %
FEV1 FVC LLN: 68
FEV1 FVC POST REF: 63.1 %
FEV1 FVC POST: 50.26 % (ref 68.08–91.31)
FEV1 FVC PRE REF: 58.3 %
FEV1 FVC PRE: 46.44 % (ref 68.08–91.31)
FEV1 FVC REF: 80
FEV1 LLN: 2.03
FEV1 POST REF: 65.2 %
FEV1 POST: 1.73 L (ref 2.03–3.27)
FEV1 PRE REF: 58.3 %
FEV1 PRE: 1.54 L (ref 2.03–3.27)
FEV1 REF: 2.65
FEV6 CHG: 4.1 %
FEV6 LLN: 2.68
FEV6 POST REF: 86 %
FEV6 POST: 2.9 L (ref 2.68–4.06)
FEV6 PRE REF: 82.6 %
FEV6 PRE: 2.78 L (ref 2.68–4.06)
FEV6 REF: 3.37
FRCN2 LLN: 1.91
FRCN2 REF: 2.73
FRCPL PRE: 3.1 L
FRCPLETH LLN: 1.91
FRCPLETH PREREF: 113.8 %
FRCPLETH REF: 2.73
FVC CHG: 3.3 %
FVC LLN: 2.57
FVC POST REF: 102.7 %
FVC POST: 3.44 L (ref 2.57–4.12)
FVC PRE REF: 99.3 %
FVC PRE: 3.33 L (ref 2.57–4.12)
FVC REF: 3.35
IVC PRE: 2.91 L (ref 2.57–4.12)
IVC SINGLE BREATH LLN: 2.57
IVC SINGLE BREATH PRE REF: 86.9 %
IVC SINGLE BREATH REF: 3.35
MVV LLN: 84
MVV REF: 99
PEF CHG: 4.1 %
PEF LLN: 4.84
PEF POST REF: 81.7 %
PEF POST: 5.38 L/S (ref 4.84–8.32)
PEF PRE REF: 78.5 %
PEF PRE: 5.17 L/S (ref 4.84–8.32)
PEF REF: 6.58
RAW LLN: 3.06
RAW PRE REF: 217.7 %
RAW PRE: 6.66 CMH2O*S/L (ref 3.06–3.06)
RAW REF: 3.06
RV LLN: 1.29
RV PRE REF: 133 %
RV PRE: 2.48 L (ref 1.29–2.44)
RV REF: 1.86
RVN2 LLN: 1.29
RVN2 REF: 1.86
RVN2TLCN2 LLN: 28
RVN2TLCN2 REF: 38
RVTLC LLN: 28
RVTLC PRE REF: 112.4 %
RVTLC PRE: 42.72 % (ref 28.41–47.59)
RVTLC REF: 38
TLC LLN: 4.05
TLC PRE REF: 115.3 %
TLC PRE: 5.8 L (ref 4.05–6.02)
TLC REF: 5.03
TLCN2 LLN: 4.05
TLCN2 REF: 5.03
VA PRE: 4.43 L (ref 4.88–4.88)
VA SINGLE BREATH LLN: 4.88
VA SINGLE BREATH PRE REF: 90.7 %
VA SINGLE BREATH REF: 4.88
VC LLN: 2.57
VC PRE REF: 99.3 %
VC PRE: 3.33 L (ref 2.57–4.12)
VC REF: 3.35
VCMAXN2 LLN: 2.57
VCMAXN2 REF: 3.35
VTGRAWPRE: 3.29 L

## 2018-09-12 ENCOUNTER — HISTORICAL (OUTPATIENT)
Dept: ADMINISTRATIVE | Facility: HOSPITAL | Age: 58
End: 2018-09-12

## 2018-09-12 LAB
APPEARANCE, UA: CLEAR
BACTERIA #/AREA URNS AUTO: NORMAL /HPF
BILIRUB UR QL STRIP: NEGATIVE MG/DL
BUN SERPL-MCNC: 17 MG/DL (ref 7–18)
CALCIUM SERPL-MCNC: 9.1 MG/DL (ref 8.5–10)
CHLORIDE SERPL-SCNC: 109 MMOL/L (ref 98–107)
CO2 SERPL-SCNC: 27 MMOL/L (ref 21–32)
COLOR UR: YELLOW
CREAT SERPL-MCNC: 0.73 MG/DL (ref 0.6–1.3)
CREAT/UREA NIT SERPL: 23.3
GLUCOSE (UA): NEGATIVE MG/DL
GLUCOSE SERPL-MCNC: 90 MG/DL (ref 74–106)
HGB UR QL STRIP: NEGATIVE UNIT/L
KETONES UR QL STRIP: NEGATIVE MG/DL
LEUKOCYTE ESTERASE UR QL STRIP: NEGATIVE UNIT/L
NITRITE UR QL STRIP.AUTO: NEGATIVE
PH UR STRIP: 5 [PH]
POTASSIUM SERPL-SCNC: 4.1 MMOL/L (ref 3.5–5.1)
PROT UR QL STRIP: NEGATIVE MG/DL
RBC #/AREA URNS HPF: NORMAL /HPF
SODIUM SERPL-SCNC: 141 MMOL/L (ref 136–145)
SP GR UR STRIP: 1.01
SQUAMOUS EPITHELIAL, UA: NORMAL /LPF
UROBILINOGEN UR STRIP-ACNC: 0.2 MG/DL
WBC #/AREA URNS AUTO: NORMAL /[HPF]

## 2018-10-05 ENCOUNTER — COMMITTEE REVIEW (OUTPATIENT)
Dept: TRANSPLANT | Facility: CLINIC | Age: 58
End: 2018-10-05

## 2018-10-05 DIAGNOSIS — Z00.5 TRANSPLANT DONOR EVALUATION: Primary | ICD-10-CM

## 2018-10-05 NOTE — COMMITTEE REVIEW
Thea Major was presented at selection committee on 10/5/18. Patient did meet selection criteria for living kidney donation. No additional testing required. Will need to get colonoscopy before surgery can be scheduled.     CT scan reviewed - will use left kidney for donation.    Patient notified of committee decision. Explained that we can not schedule surgery until her colonoscopy is completed and the recipient has been placed on the  donor waitlist. All questions were answered and patient verbalized understanding.     Note written by Belgica Fulton RN    ===============================================================  I was present at the meeting and attest to the decision of the committee

## 2018-10-08 ENCOUNTER — PROCEDURE VISIT (OUTPATIENT)
Dept: PULMONOLOGY | Facility: CLINIC | Age: 58
End: 2018-10-08
Payer: OTHER GOVERNMENT

## 2018-10-08 ENCOUNTER — OFFICE VISIT (OUTPATIENT)
Dept: PULMONOLOGY | Facility: CLINIC | Age: 58
End: 2018-10-08
Payer: OTHER GOVERNMENT

## 2018-10-08 ENCOUNTER — LAB VISIT (OUTPATIENT)
Dept: LAB | Facility: HOSPITAL | Age: 58
End: 2018-10-08
Attending: INTERNAL MEDICINE
Payer: COMMERCIAL

## 2018-10-08 VITALS
RESPIRATION RATE: 17 BRPM | SYSTOLIC BLOOD PRESSURE: 126 MMHG | HEIGHT: 65 IN | OXYGEN SATURATION: 96 % | BODY MASS INDEX: 23.49 KG/M2 | WEIGHT: 141 LBS | HEART RATE: 62 BPM | DIASTOLIC BLOOD PRESSURE: 70 MMHG

## 2018-10-08 DIAGNOSIS — Z00.5 WILLING TO BE KIDNEY DONOR: ICD-10-CM

## 2018-10-08 DIAGNOSIS — Z00.5 TRANSPLANT DONOR EVALUATION: ICD-10-CM

## 2018-10-08 DIAGNOSIS — J43.9 PULMONARY EMPHYSEMA, UNSPECIFIED EMPHYSEMA TYPE: ICD-10-CM

## 2018-10-08 DIAGNOSIS — J43.9 PULMONARY EMPHYSEMA, UNSPECIFIED EMPHYSEMA TYPE: Primary | ICD-10-CM

## 2018-10-08 LAB
BRPFT: ABNORMAL
FEF 25 75 CHG: 8.5 %
FEF 25 75 LLN: 1.32
FEF 25 75 POST REF: 17.2 %
FEF 25 75 PRE REF: 15.9 %
FEF 25 75 REF: 2.49
FET100 CHG: 1.6 %
FEV1 CHG: 4.1 %
FEV1 FVC CHG: 3.8 %
FEV1 FVC LLN: 68
FEV1 FVC POST REF: 56.8 %
FEV1 FVC PRE REF: 54.7 %
FEV1 FVC REF: 80
FEV1 LLN: 2.05
FEV1 POST REF: 63.9 %
FEV1 PRE REF: 61.4 %
FEV1 REF: 2.68
FEV6 CHG: 1.8 %
FEV6 LLN: 2.72
FEV6 POST REF: 88.1 %
FEV6 POST: 3.01 L (ref 2.72–4.12)
FEV6 PRE REF: 86.5 %
FEV6 PRE: 2.96 L (ref 2.72–4.12)
FEV6 REF: 3.42
FVC CHG: 0.2 %
FVC LLN: 2.6
FVC POST REF: 111.9 %
FVC PRE REF: 111.6 %
FVC REF: 3.39
MVV LLN: 86
MVV REF: 101
PEF CHG: -0.6 %
PEF LLN: 4.89
PEF POST REF: 78 %
PEF PRE REF: 78.5 %
PEF REF: 6.65
POST FEF 25 75: 0.43 L/S (ref 1.32–4.02)
POST FET 100: 15.29 SEC
POST FEV1 FVC: 45.18 % (ref 67.99–89.49)
POST FEV1: 1.71 L (ref 2.05–3.29)
POST FVC: 3.79 L (ref 2.6–4.22)
POST PEF: 5.19 L/S (ref 4.89–8.41)
PRE FEF 25 75: 0.39 L/S (ref 1.32–4.02)
PRE FET 100: 15.04 SEC
PRE FEV1 FVC: 43.52 % (ref 67.99–89.49)
PRE FEV1: 1.65 L (ref 2.05–3.29)
PRE FVC: 3.79 L (ref 2.6–4.22)
PRE PEF: 5.22 L/S (ref 4.89–8.41)

## 2018-10-08 PROCEDURE — 87517 HEPATITIS B DNA QUANT: CPT | Mod: TXP

## 2018-10-08 PROCEDURE — 36415 COLL VENOUS BLD VENIPUNCTURE: CPT | Mod: PO,TXP

## 2018-10-08 PROCEDURE — 94060 EVALUATION OF WHEEZING: CPT | Mod: S$GLB,,, | Performed by: INTERNAL MEDICINE

## 2018-10-08 PROCEDURE — 99214 OFFICE O/P EST MOD 30 MIN: CPT | Mod: 25,S$GLB,, | Performed by: NURSE PRACTITIONER

## 2018-10-08 PROCEDURE — 3078F DIAST BP <80 MM HG: CPT | Mod: CPTII,S$GLB,, | Performed by: NURSE PRACTITIONER

## 2018-10-08 PROCEDURE — 99213 OFFICE O/P EST LOW 20 MIN: CPT | Mod: PBBFAC,PO,TXP | Performed by: NURSE PRACTITIONER

## 2018-10-08 PROCEDURE — 3074F SYST BP LT 130 MM HG: CPT | Mod: CPTII,S$GLB,, | Performed by: NURSE PRACTITIONER

## 2018-10-08 PROCEDURE — 99999 PR PBB SHADOW E&M-EST. PATIENT-LVL III: CPT | Mod: PBBFAC,TXP,, | Performed by: NURSE PRACTITIONER

## 2018-10-08 PROCEDURE — 3008F BODY MASS INDEX DOCD: CPT | Mod: CPTII,S$GLB,, | Performed by: NURSE PRACTITIONER

## 2018-10-08 NOTE — PROGRESS NOTES
"Subjective:      Patient ID: Thea Major is a 58 y.o. female.    Chief Complaint: COPD    HPI  Patient with Stage II COPD presents for follow up. Now on Anoro   She is anticipating being a kidney donor.   Not smoking.    No fever, chills, or hemoptysis. No pleuritic type chest pain. Breathing is stable as compared to 6 months ago.      /70   Pulse 62   Resp 17   Ht 5' 4.6" (1.641 m)   Wt 64 kg (141 lb)   SpO2 96%   BMI 23.76 kg/m²   Body mass index is 23.76 kg/m².    Review of Systems   Constitutional: Negative.    HENT: Negative.    Respiratory: Negative.    Cardiovascular: Negative.    Musculoskeletal: Negative.    Gastrointestinal: Negative.    Neurological: Negative.    Psychiatric/Behavioral: Negative.      Objective:      Physical Exam   Constitutional: She is oriented to person, place, and time. She appears well-developed and well-nourished.   HENT:   Head: Normocephalic and atraumatic.   Mouth/Throat: Oropharynx is clear and moist.   Neck: Normal range of motion. Neck supple.   Cardiovascular: Normal rate and regular rhythm. Exam reveals no gallop.   No murmur heard.  Pulmonary/Chest: Effort normal and breath sounds normal.   Abdominal: Soft. She exhibits no mass. There is no tenderness.   Musculoskeletal: Normal range of motion. She exhibits no edema.   Neurological: She is alert and oriented to person, place, and time.   Skin: Skin is warm and dry.   Psychiatric: She has a normal mood and affect.     Personal Diagnostic Review  Spirometry reviewed. FEV1 63 % of predicted.    Results for orders placed during the hospital encounter of 06/26/18   X-ray chest PA and lateral    Narrative EXAMINATION:  XR CHEST PA AND LATERAL    CLINICAL HISTORY:  Encounter for examination of potential donor of organ and tissue    FINDINGS:  Heart size is normal.  Lungs a clear which could be slightly emphysematous.  CT may be warranted.      Impression See above      Electronically signed by: William Sheehan, " MD  Date:    06/26/2018  Time:    08:10         Assessment:       1. Pulmonary emphysema, unspecified emphysema type    2. Willing to be kidney donor        Outpatient Encounter Medications as of 10/8/2018   Medication Sig Dispense Refill    ALPRAZolam (XANAX) 0.5 MG tablet Take 0.25 mg by mouth daily as needed for Anxiety.      amLODIPine (NORVASC) 10 MG tablet Take 10 mg by mouth once daily.      aspirin (ECOTRIN) 81 MG EC tablet Take 81 mg by mouth once daily.      buPROPion (WELLBUTRIN XL) 150 MG TB24 tablet Take 150 mg by mouth every evening.      levocetirizine (XYZAL) 5 MG tablet Take 5 mg by mouth every evening.      montelukast (SINGULAIR) 10 mg tablet Take 10 mg by mouth once daily.      umeclidinium-vilanterol (ANORO ELLIPTA) 62.5-25 mcg/actuation DsDv Inhale 1 puff into the lungs once daily. 30 each 11     No facility-administered encounter medications on file as of 10/8/2018.      No orders of the defined types were placed in this encounter.    Plan:   Continue current pulmonary regimen.  Follow up in 6 months nonspecific subcentimeter lung nodule.

## 2018-10-10 ENCOUNTER — TELEPHONE (OUTPATIENT)
Dept: TRANSPLANT | Facility: CLINIC | Age: 58
End: 2018-10-10

## 2018-10-10 LAB
HEPATITIS B VIRAL DNA - QUANTITATIVE: <10 IU/ML
HEPATITIS B VIRUS DNA: NOT DETECTED
LOG HBV IU/ML: <1 LOG (10) IU/ML

## 2018-10-10 NOTE — TELEPHONE ENCOUNTER
----- Message from Jeremy Mcgovern MD sent at 10/10/2018 10:55 AM CDT -----  Regarding: RE: Donor CT  Left donor with 2 arteries   ----- Message -----  From: Belgica Fulton  Sent: 10/8/2018  10:55 AM  To: Jeremy Mcgovern MD  Subject: Donor CT                                         Please review donor CT

## 2018-10-29 ENCOUNTER — TELEPHONE (OUTPATIENT)
Dept: TRANSPLANT | Facility: CLINIC | Age: 58
End: 2018-10-29

## 2018-10-29 DIAGNOSIS — Z00.5 TRANSPLANT DONOR EVALUATION: Primary | ICD-10-CM

## 2018-10-29 NOTE — TELEPHONE ENCOUNTER
----- Message from Elizabeth Lejeune, NP sent at 10/29/2018 11:44 AM CDT -----  No contraindication of having surgery at any point.  Low risk  Thanks  Liz LeJeune  ----- Message -----  From: Betty Bush RN  Sent: 10/29/2018  10:54 AM  To: Elizabeth Lejeune, NP    Good Morning,    I am one of the living donor coordinators taking care of Mrs. Major.  I wanted to clarify when she needs a repeat Chest CT.    You wanted a repeat in 6 months which would be 12/2018.  She is currently scheduled for 1/2019.  She would like to have her living donor nephrectomy 12/10/18.  I wanted to make sure that you do not think this is a contraindication to proceeding prior to repeat CT or should her CT be moved up to December.  Please advise.     Thanks,   Betty

## 2018-10-29 NOTE — TELEPHONE ENCOUNTER
Confirmed surgery for 12/10/18 and pre-op on 11/27/18.   Denies any hormone use at this time.  Pt will stop ASA 7-10 days prior to surgery.  Message sent to Lejeune, NP regarding plan to repeat Chest CT.  Currently scheduled for 1/14/19.  Will have cxm repeated on 12/3/18 at [facility].  Discussed possibility of cancellation due to a busy service.  Denies need for FMLA forms.  Discussed the need to inform team of any changes in medical condition.   All questions answered and patient verbalized understanding.

## 2018-10-31 ENCOUNTER — TELEPHONE (OUTPATIENT)
Dept: TRANSPLANT | Facility: CLINIC | Age: 58
End: 2018-10-31

## 2018-10-31 DIAGNOSIS — Z00.5 TRANSPLANT DONOR EVALUATION: Primary | ICD-10-CM

## 2018-10-31 NOTE — TELEPHONE ENCOUNTER
Called patient to inform her that Pulmonary was ok with Chest CT being repeated in January as she is low risk.  Will request HLA kit to have blood drawn on 12/3/18 for final cxm.  All questions answered and patient verbalized understanding.

## 2018-11-06 ENCOUNTER — TELEPHONE (OUTPATIENT)
Dept: PREADMISSION TESTING | Facility: HOSPITAL | Age: 58
End: 2018-11-06

## 2018-11-06 NOTE — TELEPHONE ENCOUNTER
----- Message from Jossy Dunn sent at 11/6/2018 10:05 AM CST -----  Regarding: Need Pre-Op Appt  Please schedule this patient, on Yasmeen's schedule for Pre-Op clearance on 11-, at 01:00 PM.  Surgery Date is 12/10/2018.  Her surgeons are Brooks Galeas & René.  She is a kidney donor.    Thanks:  Jossy

## 2018-11-26 DIAGNOSIS — Z00.5 TRANSPLANT DONOR EVALUATION: Primary | ICD-10-CM

## 2018-11-26 PROBLEM — Z01.818 PRE-OP EXAM: Status: ACTIVE | Noted: 2018-11-26

## 2018-11-26 NOTE — PROGRESS NOTES
Kidney Donor Preoperative Evaluation    Subjective:     CC:  Preoperative evaluation before donor nephrectomy.    HPI:  Ms. Major is a 58 y.o. year old White female who has been approved to be a living related donor for her brother.  She denies any changes in her health condition since her previous visit.      Patient denies any history of coronary artery disease, stroke, seizure disorder, chronic obstructive pulmonary disease, liver disease, kidney stones, gallstones, deep venous thrombosis, pulmonary embolism, recurrent urinary tract infections or malignancies.      pulmonary nodules   CT scan with nonspecific subcentimeter pulmonary nodule  PFT with moderate obstruction, but patient is physically fit with high endurance without abnormal dyspnea.  Low risk surgical risk from a pulmonary standpoint   follow-up.  CT scan in 6 months--> due ~ 1/2019   to preserve lung function, started on  Anoro daily. ? Follow up in 2 months with spirometry      HX multinodular thyroid  Thyroid Ultrasound     7/14/18 (outside): Multinodular thyroid gland. Most nodules are cystic with several containing colloid, a benign feature.   There is a single solid appearing subcentimeter hypoechoic nodule noted the lower pole of the right thyroid lobe. This does not meet criteria for FNA consideration recommendation at this time. Sonographic follow-up in about 12 months, unless sooner indicated, may be of value.   7/16/18 (outside): Dr. Courtney:12 month follow up ultrasound.    Blood pressure     3/12/18: pcp: 126/74  6/26/18: Stress: 124/60  6/27/18: 118/80, 117/65       Past Medical History:   Diagnosis Date    Allergy     Anxiety     Hypertension 2010    Shingles      Past Surgical History:   Procedure Laterality Date    BREAST BIOPSY  1981    benign    DILATION AND CURETTAGE OF UTERUS  1993    TUBAL LIGATION  1996     Social History     Tobacco Use    Smoking status: Former Smoker     Packs/day: 1.00     Years: 30.00      Pack years: 30.00     Types: Cigarettes     Last attempt to quit: 2016     Years since quittin.7    Smokeless tobacco: Never Used   Substance Use Topics    Alcohol use: Yes     Comment: 2 x/ month    Drug use: Yes     Types: Marijuana     Comment: occaisonal--last use 2018     Family History   Problem Relation Age of Onset    COPD Father     Lung disease Father     Tuberculosis Father     Asbestos Father     Aneurysm Mother     No Known Problems Sister        Review of Systems    Objective:   body mass index is unknown because there is no height or weight on file.       Physical Exam      Labs:  Labs were reviewed with the patient.  ABO type: O POS  Lab Results   Component Value Date    WBC 5.15 2018    HGB 14.2 2018    HCT 44.0 2018    MCV 92 2018     2018     Lab Results   Component Value Date    CREATININE 0.9 2018    BUN 15 2018     2018    K 4.3 2018     2018    CO2 26 2018     Lab Results   Component Value Date    ALT 16 2018    AST 22 2018    ALKPHOS 68 2018    BILITOT 0.7 2018     No results found for: TSH    Assessment:     1. Willing to be kidney donor    2. Pre-op exam    3. Essential hypertension    4. Anxiety    5. Pulmonary emphysema, unspecified emphysema type        Plan:   There is no height or weight on file to calculate BMI.    HX  Multinodular thyroid glans-->Add Thyroid panel to labs  No results found for: TSH  Needs to f/u 2019 thyroid US    Pulm nodule surveillance--> repeat CT chest  scan in 6 months--> due ~ 2019    Donor Candidacy:   Ms. Major remains {candidacy:[id number]} candidate for kidney donation.    Roxanna Domínguez NP         Counseling:   I discussed with Ms. Major that donation is a voluntary activity and reiterated it should be done willingly and for altruistic reasons only.  I reviewed that no payment should be received for donating.  I also  discussed that coercion, guilt, pressure, or feelings of obligation are not appropriate reasons to donate.  The option to withdraw at any time was emphasized.  Ms. Major was reminded that a medical opt out can be given to protect her confidentiality, and no member of the transplant team will discuss specifics of her health or medical/social history with anyone else without permission.  The need for lifelong routine medical follow-up for optimal health, including routine health maintenance was reviewed.    We also discussed the long term risks associated with kidney donation.  I told the patient that her GFR should return to within 75-80% of pre-donation level within six months of donation.  I informed the patient that there is a small risk of developing albuminuria and hypertension following donor nephrectomy.  I also informed the patient that based on current literature, the risk of developing end-stage renal disease following donor nephrectomy is similar to the general population.    I rereviewed with Ms. Major available lab results and other diagnostics from the evaluation process    Additional Counseling:   The patient was counseled on the need for regular follow-up with a primary care physician for blood pressure and cholesterol screening.  The importance of age appropriate health screening was also emphasized.    Follow-up:  Follow-up with transplant surgery per protocol.

## 2018-11-27 ENCOUNTER — OFFICE VISIT (OUTPATIENT)
Dept: TRANSPLANT | Facility: CLINIC | Age: 58
End: 2018-11-27
Payer: COMMERCIAL

## 2018-11-27 ENCOUNTER — ANESTHESIA EVENT (OUTPATIENT)
Dept: SURGERY | Facility: HOSPITAL | Age: 58
DRG: 661 | End: 2018-11-27
Payer: OTHER GOVERNMENT

## 2018-11-27 ENCOUNTER — CLINICAL SUPPORT (OUTPATIENT)
Dept: TRANSPLANT | Facility: CLINIC | Age: 58
End: 2018-11-27
Payer: OTHER GOVERNMENT

## 2018-11-27 ENCOUNTER — HOSPITAL ENCOUNTER (OUTPATIENT)
Dept: PREADMISSION TESTING | Facility: HOSPITAL | Age: 58
Discharge: HOME | End: 2018-11-27
Attending: ANESTHESIOLOGY
Payer: OTHER GOVERNMENT

## 2018-11-27 ENCOUNTER — OFFICE VISIT (OUTPATIENT)
Dept: TRANSPLANT | Facility: CLINIC | Age: 58
End: 2018-11-27
Payer: OTHER GOVERNMENT

## 2018-11-27 ENCOUNTER — HOSPITAL ENCOUNTER (OUTPATIENT)
Dept: CARDIOLOGY | Facility: CLINIC | Age: 58
Discharge: HOME | End: 2018-11-27
Payer: OTHER GOVERNMENT

## 2018-11-27 ENCOUNTER — CLINICAL SUPPORT (OUTPATIENT)
Dept: TRANSPLANT | Facility: CLINIC | Age: 58
End: 2018-11-27
Payer: COMMERCIAL

## 2018-11-27 ENCOUNTER — SOCIAL WORK (OUTPATIENT)
Dept: TRANSPLANT | Facility: CLINIC | Age: 58
End: 2018-11-27
Payer: COMMERCIAL

## 2018-11-27 VITALS
WEIGHT: 141.31 LBS | RESPIRATION RATE: 18 BRPM | HEIGHT: 64 IN | BODY MASS INDEX: 24.13 KG/M2 | DIASTOLIC BLOOD PRESSURE: 79 MMHG | OXYGEN SATURATION: 95 % | HEART RATE: 77 BPM | SYSTOLIC BLOOD PRESSURE: 122 MMHG | TEMPERATURE: 98 F

## 2018-11-27 VITALS
RESPIRATION RATE: 18 BRPM | WEIGHT: 141.31 LBS | DIASTOLIC BLOOD PRESSURE: 79 MMHG | OXYGEN SATURATION: 95 % | HEART RATE: 77 BPM | TEMPERATURE: 98 F | SYSTOLIC BLOOD PRESSURE: 122 MMHG | BODY MASS INDEX: 24.13 KG/M2 | HEIGHT: 64 IN

## 2018-11-27 VITALS
OXYGEN SATURATION: 95 % | HEIGHT: 64 IN | WEIGHT: 141.31 LBS | OXYGEN SATURATION: 95 % | HEART RATE: 77 BPM | SYSTOLIC BLOOD PRESSURE: 122 MMHG | WEIGHT: 141.31 LBS | RESPIRATION RATE: 18 BRPM | TEMPERATURE: 98 F | RESPIRATION RATE: 18 BRPM | BODY MASS INDEX: 24.13 KG/M2 | DIASTOLIC BLOOD PRESSURE: 79 MMHG | HEIGHT: 64 IN | TEMPERATURE: 98 F | DIASTOLIC BLOOD PRESSURE: 79 MMHG | BODY MASS INDEX: 24.13 KG/M2 | HEART RATE: 77 BPM | SYSTOLIC BLOOD PRESSURE: 122 MMHG

## 2018-11-27 VITALS
WEIGHT: 143.5 LBS | SYSTOLIC BLOOD PRESSURE: 126 MMHG | HEART RATE: 73 BPM | HEIGHT: 64 IN | BODY MASS INDEX: 24.5 KG/M2 | OXYGEN SATURATION: 95 % | RESPIRATION RATE: 18 BRPM | TEMPERATURE: 99 F | DIASTOLIC BLOOD PRESSURE: 73 MMHG

## 2018-11-27 DIAGNOSIS — Z01.818 PRE-OP EXAM: ICD-10-CM

## 2018-11-27 DIAGNOSIS — Z00.5 WILLING TO BE KIDNEY DONOR: Primary | ICD-10-CM

## 2018-11-27 DIAGNOSIS — J43.9 PULMONARY EMPHYSEMA, UNSPECIFIED EMPHYSEMA TYPE: ICD-10-CM

## 2018-11-27 DIAGNOSIS — I10 ESSENTIAL HYPERTENSION: ICD-10-CM

## 2018-11-27 DIAGNOSIS — Z01.818 PREOP TESTING: ICD-10-CM

## 2018-11-27 DIAGNOSIS — Z01.818 PREOP TESTING: Primary | ICD-10-CM

## 2018-11-27 DIAGNOSIS — F41.9 ANXIETY: ICD-10-CM

## 2018-11-27 PROCEDURE — 93000 ELECTROCARDIOGRAM COMPLETE: CPT | Mod: S$GLB,,, | Performed by: INTERNAL MEDICINE

## 2018-11-27 PROCEDURE — 99999 PR PBB SHADOW E&M-EST. PATIENT-LVL I: CPT | Mod: PBBFAC,,,

## 2018-11-27 PROCEDURE — 99999 PR PBB SHADOW E&M-EST. PATIENT-LVL III: CPT | Mod: PBBFAC,,,

## 2018-11-27 PROCEDURE — 99211 OFF/OP EST MAY X REQ PHY/QHP: CPT | Mod: PBBFAC,27,TXP

## 2018-11-27 PROCEDURE — 99999 PR PBB SHADOW E&M-EST. PATIENT-LVL III: CPT | Mod: PBBFAC,,, | Performed by: INTERNAL MEDICINE

## 2018-11-27 PROCEDURE — 99214 OFFICE O/P EST MOD 30 MIN: CPT | Mod: S$GLB,,, | Performed by: TRANSPLANT SURGERY

## 2018-11-27 PROCEDURE — 99213 OFFICE O/P EST LOW 20 MIN: CPT | Mod: PBBFAC,27,TXP,25

## 2018-11-27 PROCEDURE — 99213 OFFICE O/P EST LOW 20 MIN: CPT | Mod: PBBFAC,TXP,25

## 2018-11-27 PROCEDURE — 99215 OFFICE O/P EST HI 40 MIN: CPT | Mod: S$GLB,,, | Performed by: INTERNAL MEDICINE

## 2018-11-27 PROCEDURE — 99213 OFFICE O/P EST LOW 20 MIN: CPT | Mod: PBBFAC,25,27,TXP

## 2018-11-27 PROCEDURE — 99213 OFFICE O/P EST LOW 20 MIN: CPT | Mod: PBBFAC,25,27,TXP | Performed by: INTERNAL MEDICINE

## 2018-11-27 RX ORDER — HEPARIN SODIUM 5000 [USP'U]/ML
5000 INJECTION, SOLUTION INTRAVENOUS; SUBCUTANEOUS
Status: CANCELLED | OUTPATIENT
Start: 2018-11-27

## 2018-11-27 NOTE — DISCHARGE INSTRUCTIONS
Your surgery has been scheduled for:__________________________________________    You should report to:  ____[facility], located on the [place] side of the first floor of the           [facility] ([phone])  ____[facility], located on the [facility] side of the            Second floor of the [facility] ([phone])  ____3rd Floor [facility] located on the [facility] side of the [facility] [phone]  Please Note   - Tell your doctor if you take Aspirin, products containing Aspirin, herbal medications  or blood thinners, such as Coumadin, Ticlid, or Plavix.  (Consult your provider regarding holding or stopping before surgery).  - Arrange for someone to drive you home following surgery.  You will not be allowed to leave the surgical facility alone or drive yourself home following sedation and anesthesia.  Before Surgery  - Stop taking all herbal medications 14days prior to surgery  - No Motrin/Advil (Ibuprofen) 7 days before surgery  - No Aleve (Naproxen) 7 days before surgery  - Stop Taking Asprin, products containing Asprin _____days before surgery  - Stop taking blood thinners_______days before surgery  - No Goody's/BC  Powder 7 days before surgery  - Refrain from drinking alcoholic beverages for 24hours before and after surgery  - Stop or limit smoking _________days before surgery  - You may take Tylenol for pain    Night before Surgery  NOTHING TO EAT OR DRINK AFTER MIDNIGHT OR FOLLOW SURGEON'S INSTRUCTIONS   - Take a shower or bath (shower is recommended).  Bathe with Hibiclens soap or an antibacterial soap from the neck down.  If not supplied by your surgeon, hibiclens soap will need to be purchased over the counter in pharmacy.  Rinse soap off thoroughly.  - Shampoo your hair with your regular shampoo  The Day of Surgery  ·  If you are told to take medication on the morning of surgery, it may be taken  with a sip of water.   - Take another bath or shower with hibiclens or any antibacterial soap, to reduce the chance of infection.  - Take heart and blood pressure medications with a small sip of water, as advised by the perioperative team.  - Do not take fluid pills  - You may brush your teeth and rinse your mouth, but do not swall any additional water.   - Do not apply perfumes, powder, body lotions or deodorant on the day of surgery.  - Nail polish should be removed.  - Do not wear makeup or moisturizer  - Wear comfortable clothes, such as a button front shirt and loose fitting pants.  - Leave all jewelry, including body piercings, and valuables at home.    - Bring any devices you will neeed after surgery such as crutches or canes.  - If you have sleep apnea, please bring your CPAP machine  In the event that your physical condition changes including the onset of a cold or respiratory illness, or if you have to delay or cancel your surgery, please notify your surgeon.      Anesthesia: General Anesthesia  Youre due to have surgery. During surgery, youll be given medication called anesthesia. (It is also called anesthetic.) This will keep you comfortable and pain-free. Your anesthesia provider will use general anesthesia. This sheet tells you more about it.  What is general anesthesia?     You are watched continuously during your procedure by the anesthesia provider   General anesthesia puts you into a state like deep sleep. It goes into the bloodstream (IV anesthetics), into the lungs (gas anesthetics), or both. You feel nothing during the procedure. You will not remember it. During the procedure, the anesthesia provider monitors you continuously. He or she checks your heart rate and rhythm, blood pressure, breathing, and blood oxygen.  · IV Anesthetics. IV anesthetics are given through an IV line in your arm. Theyre often given first. This is so you are asleep before a gas anesthetic is started. Some kinds of IV  anesthetics relieve pain. Others relax you. Your doctor will decide which kind is best in your case.  · Gas Anesthetics. Gas anesthetics are breathed into the lungs. They are often used to keep you asleep. They can be given through a facemask or a tube placed in your larynx or trachea (breathing tube).  ? If you have a facemask, your anesthesia provider will most likely place it over your nose and mouth while youre still awake. Youll breathe oxygen through the mask as your IV anesthetic is started. Gas anesthetic may be added through the mask.  ? If you have a tube in the larynx or trachea, it will be inserted into your throat after youre asleep.  Anesthesia tools and medications  You will likely have:  · IV anesthetics. These are put into an IV line into your bloodstream.  · Gas anesthetics. You breathe these anesthetics into your lungs, where they pass into your bloodstream.  · Pulse oximeter. This is a small clip that is attached to the end of your finger. This measures your blood oxygen level.  · Electrocardiography leads (electrodes). These are small sticky pads that are placed on your chest. They record your heart rate and rhythm.  · Blood pressure cuff. This reads your blood pressure.  Risks and possible complications  General anesthesia has some risks. These include:  · Breathing problems  · Nausea and vomiting  · Sore throat or hoarseness (usually temporary)  · Allergic reaction to the anesthetic  · Irregular heartbeat (rare)  · Cardiac arrest (rare)   Anesthesia safety  · Follow all instructions you are given for how long not to eat or drink before your procedure.  · Be sure your doctor knows what medications and drugs you take. This includes over-the-counter medications, herbs, supplements, alcohol or other drugs. You will be asked when those were last taken.  · Have an adult family member or friend drive you home after the procedure.  · For the first 24 hours after your surgery:  ? Do not drive or use  heavy equipment.  ? Have a trusted family member or spouse make important decisions or sign documents.  ? Avoid alcohol.  ? Have a responsible adult stay with you. He or she can watch for problems and help keep you safe.  Date Last Reviewed: 10/16/2014  © 0483-5933 [employer]. [address] [place] [address]. All rights reserved. This information is not intended as a substitute for professional medical care. Always follow your healthcare professional's instructions.

## 2018-11-27 NOTE — PROGRESS NOTES
Kidney Donor Preoperative Evaluation    Subjective:     CC:  Preoperative evaluation before donor nephrectomy.    HPI:  Ms. Major is a 58 y.o. year old White female who has been approved to be a living related donor for her brother.  She denies any changes in her health condition since her previous visit.      No change since the last clinic visit    No chest pain or  SOB no nausea vomit or diarrhea    No admission or ER visits    No UTIs    Just 1 pregnancy lost during the first trimester. No h/o pre=eclamp[mayra or any other hypertensive disorder associated with pregnancy.     No kidney stones No proteinuria     No prematurity. No hematuria. No aware of any disease as a child.     She was diagnosed with hypertension 7 yr ago, she was 49 yr old. She is not aware  Of  family h/o hypertension. No kidney disease.     Currently on norvasc, which is working for her. Never had any complications from elevated BP. No strokes heart disease, she follows clsoely with her PCP. No evidence of TOD. Her 2D echo showed no LVH and hypertrophy. No diastolic dysfunction.             Patient denies any history of coronary artery disease, stroke, seizure disorder, chronic obstructive pulmonary disease, liver disease, kidney stones, gallstones, deep venous thrombosis, pulmonary embolism, recurrent urinary tract infections or malignancies.      pulmonary nodules   CT scan with nonspecific subcentimeter pulmonary nodule  PFT with moderate obstruction, but patient is physically fit with high endurance without abnormal dyspnea.  Low risk surgical risk from a pulmonary standpoint   follow-up.  CT scan in 6 months--> due ~ 1/2019   to preserve lung function, started on  Anoro daily. ? Follow up in 2 months with spirometry      HX multinodular thyroid  Thyroid Ultrasound     7/14/18 (outside): Multinodular thyroid gland. Most nodules are cystic with several containing colloid, a benign feature.   There is a single solid appearing  subcentimeter hypoechoic nodule noted the lower pole of the right thyroid lobe. This does not meet criteria for FNA consideration recommendation at this time. Sonographic follow-up in about 12 months, unless sooner indicated, may be of value.   18 (outside): Dr. Courtney:12 month follow up ultrasound.    Blood pressure     3/12/18: pcp: 126/74  18: Stress: 124/60  18: 118/80, 117/65       Past Medical History:   Diagnosis Date    Allergy     Anxiety     Hypertension 2010    Shingles      Past Surgical History:   Procedure Laterality Date    BREAST BIOPSY  1981    benign    DILATION AND CURETTAGE OF UTERUS      TUBAL LIGATION       Social History     Tobacco Use    Smoking status: Former Smoker     Packs/day: 1.00     Years: 30.00     Pack years: 30.00     Types: Cigarettes     Last attempt to quit: 2016     Years since quittin.7    Smokeless tobacco: Never Used   Substance Use Topics    Alcohol use: Yes     Comment: 2 x/ month    Drug use: Yes     Types: Marijuana     Comment: occaisonal--last use 2018     Family History   Problem Relation Age of Onset    COPD Father     Lung disease Father     Tuberculosis Father     Asbestos Father     Aneurysm Mother     No Known Problems Sister        Review of Systems    Objective:   body mass index is unknown because there is no height or weight on file.       Physical Exam      Labs:  Labs were reviewed with the patient.  ABO type: O POS  Lab Results   Component Value Date    WBC 5.15 2018    HGB 14.2 2018    HCT 44.0 2018    MCV 92 2018     2018     Lab Results   Component Value Date    CREATININE 0.9 2018    BUN 15 2018     2018    K 4.3 2018     2018    CO2 26 2018     Lab Results   Component Value Date    ALT 16 2018    AST 22 2018    ALKPHOS 68 2018    BILITOT 0.7 2018     No results found for: TSH    Assessment:      1. Willing to be kidney donor    2. Pre-op exam    3. Essential hypertension    4. Anxiety    5. Pulmonary emphysema, unspecified emphysema type        Plan:   I evaluated the patient and she is acceptable candidate for donation    She is due for a follow up CT of the chest in January 2019. Needs to f/u 7/2019 thyroid US    Blood work and diagnostic reviewed with the patient    Extensive education provided    Risk of CKD hypertension proteinuria and CV was discussed. Current calculators to predict CKD reviewed with patient and care giver (sister)     We spoke for 35 to 40 minutes    Review of records for additional 20 minutes            Needs to f/u 7/2019 thyroid US    Pulm nodule surveillance--> repeat CT chest  scan in 6 months--> due ~ 1/2019           Counseling:   I discussed with Ms. Major that donation is a voluntary activity and reiterated it should be done willingly and for altruistic reasons only.  I reviewed that no payment should be received for donating.  I also discussed that coercion, guilt, pressure, or feelings of obligation are not appropriate reasons to donate.  The option to withdraw at any time was emphasized.  Ms. Major was reminded that a medical opt out can be given to protect her confidentiality, and no member of the transplant team will discuss specifics of her health or medical/social history with anyone else without permission.  The need for lifelong routine medical follow-up for optimal health, including routine health maintenance was reviewed.    We also discussed the long term risks associated with kidney donation.  I told the patient that her GFR should return to within 75-80% of pre-donation level within six months of donation.  I informed the patient that there is a small risk of developing albuminuria and hypertension following donor nephrectomy.  I also informed the patient that based on current literature, the risk of developing end-stage renal disease following  donor nephrectomy is similar to the general population.    I rereviewed with Ms. Major available lab results and other diagnostics from the evaluation process    Additional Counseling:   The patient was counseled on the need for regular follow-up with a primary care physician for blood pressure and cholesterol screening.  The importance of age appropriate health screening was also emphasized.    Follow-up:  Follow-up with transplant surgery per protocol.

## 2018-11-27 NOTE — LETTER
November 29, 2018                     [facility] [facility]  [address]  [facility] [address]  Phone: [phone]   Patient: Thea Major   MR Number: [id number]   YOB: 1960   Date of Visit: 11/27/2018       Dear      Thank you for referring Thea Major to me for evaluation. Attached you will find relevant portions of my assessment and plan of care.    If you have questions, please do not hesitate to call me. I look forward to following Thea Major along with you.    Sincerely,    Zeb Woody MD    Enclosure    If you would like to receive this communication electronically, please contact [email or web address] or [phone] to request [employer] Link access.    [employer] Link is a tool which provides read-only access to select patient information with whom you have a relationship. Its easy to use and provides real time access to review your patients record including encounter summaries, notes, results, and demographic information.    If you feel you have received this communication in error or would no longer like to receive these types of communications, please e-mail [email or web address]

## 2018-11-27 NOTE — ANESTHESIA PREPROCEDURE EVALUATION
11/27/2018  Thea Major is a 58 y.o., female.    Anesthesia Evaluation    I have reviewed the Patient Summary Reports.        Review of Systems  Anesthesia Hx:  No problems with previous Anesthesia  History of prior surgery of interest to airway management or planning: Previous anesthesia: General colonoscopy 9/2018 with general anesthesia.  Denies Family Hx of Anesthesia complications.    Social:  Former Smoker 1 ppd x 30 yrs; quit 2/2016  Alcohol 1 beer occ weekly  + marijuana   Hematology/Oncology:  Hematology Normal   Oncology Normal     EENT/Dental:  EENT/Dental Normal Glasses    Cardiovascular:   Hypertension  Functional Capacity good / => 4 METS, kick boxing, circuit training 4 x weekly; denies CP, SOB    Pulmonary:   COPD (Per PFT, but patient asymptomatic, does kickboxing.) Denies Shortness of breath.  Denies Sleep Apnea. Pulmonary nodule   Renal/:  Renal/ Normal     Hepatic/GI:   Denies GERD.    Musculoskeletal:  Musculoskeletal Normal    Neurological:   Denies CVA. Denies Seizures.  Denies Pain    Endocrine:   Denies Diabetes. Multinodular thyroid   Dermatological:  Skin Normal    Psych:   anxiety          Physical Exam  General:  Well nourished    Airway/Jaw/Neck:  Airway Findings: Mouth Opening: Normal Tongue: Normal  General Airway Assessment: Adult  Jaw/Neck Findings:     Neck ROM: Normal ROM      Dental:  Dental Findings: In tact    Chest/Lungs:  Chest/Lungs Findings: Clear to auscultation, Normal Respiratory Rate     Heart/Vascular:  Heart Findings: Rate: Normal  Rhythm: Regular Rhythm  Sounds: Normal        Mental Status:  Mental Status Findings:  Cooperative, Alert and Oriented       Pt was seen in POC 11/27/18/Yasmeen Milan RN        Anesthesia Plan  Type of Anesthesia, risks & benefits discussed:  Anesthesia Type:  general  Patient's Preference: General  Intra-op  Monitoring Plan:   Intra-op Monitoring Plan Comments:   Post Op Pain Control Plan:   Post Op Pain Control Plan Comments:   Induction:   IV  Beta Blocker:  Patient is not currently on a Beta-Blocker (No further documentation required).       Informed Consent: Patient understands risks and agrees with Anesthesia plan.  Questions answered. Anesthesia consent signed with patient.  ASA Score: 3     Day of Surgery Review of History & Physical: I have interviewed and examined the patient. I have reviewed the patient's H&P dated:  There are no significant changes.          Ready For Surgery From Anesthesia Perspective.

## 2018-11-27 NOTE — PROGRESS NOTES
Met with Thea Major in the clinic as part of her pre-transplant donor clearance appointment.    1) Performed a complete medication reconciliation; Aspirin was stopped earlier this month    Patient verbalized understanding and had the opportunity to ask questions

## 2018-11-27 NOTE — H&P
"Transplant Surgery Kidney Donor Preoperative H&P    Subjective:   CC:  Preoperative evaluation before donor nephrectomy.    HPI:  Ms. Major is a 58 y.o. year old White female who has been approved to be a living related donor for her nephew.  She denies any changes in her health condition since her previous visit.      Past Medical History:   Diagnosis Date    Allergy     Anxiety     Hypertension 2010    Shingles      Past Surgical History:   Procedure Laterality Date    BREAST BIOPSY  1981    benign    DILATION AND CURETTAGE OF UTERUS  1993    TUBAL LIGATION  1996     Review of patient's allergies indicates:  No Known Allergies  Review of Systems   Constitutional: Negative for activity change, appetite change, chills, fatigue and fever.   HENT: Negative for sore throat and trouble swallowing.    Eyes: Negative for visual disturbance.   Respiratory: Negative for cough, chest tightness and shortness of breath.    Cardiovascular: Negative for chest pain, palpitations and leg swelling.   Gastrointestinal: Negative for abdominal distention, abdominal pain, blood in stool, constipation, diarrhea, nausea and vomiting.   Endocrine: Negative for polyuria.   Genitourinary: Negative for decreased urine volume, difficulty urinating, dysuria, flank pain, frequency and hematuria.   Musculoskeletal: Negative for gait problem, myalgias and neck stiffness.   Skin: Negative for rash and wound.   Neurological: Negative for dizziness, tremors, seizures, weakness, light-headedness and headaches.   Hematological: Negative for adenopathy.   Psychiatric/Behavioral: Negative for agitation, confusion and sleep disturbance.       Objective:   Blood pressure 122/79, pulse 77, temperature 98 °F (36.7 °C), temperature source Oral, resp. rate 18, height 5' 4.25" (1.632 m), weight 64.1 kg (141 lb 5 oz), SpO2 95 %.  Physical Exam   Constitutional: She is oriented to person, place, and time. She appears well-developed and well-nourished. " No distress.   HENT:   Head: Normocephalic.   Mouth/Throat: Oropharynx is clear and moist.   Eyes: Conjunctivae are normal. Pupils are equal, round, and reactive to light. No scleral icterus.   Neck: Normal range of motion. Neck supple. No tracheal deviation present. No thyromegaly present.   Cardiovascular: Normal rate, regular rhythm, normal heart sounds and intact distal pulses.   No murmur heard.  Pulmonary/Chest: Effort normal and breath sounds normal. No respiratory distress.   No supraclavicular adenopathy   Abdominal: Soft. Bowel sounds are normal. She exhibits no distension and no mass. There is no tenderness. There is no rebound and no guarding.   No inguinal adenopathy   Musculoskeletal: Normal range of motion. She exhibits no edema.   No clubbing or cyanosis   Lymphadenopathy:     She has no cervical adenopathy.   Neurological: She is alert and oriented to person, place, and time.   Skin: Skin is warm and dry. No rash noted. No erythema.   Psychiatric: She has a normal mood and affect. Her behavior is normal.   Vitals reviewed.      ABO type: O POS    Imaging Studies:  Nuclear split function renal scan:   Left:  48%   Right: 52%  CT angiogram:   Left renal arteries: 2   Right renal arteries: 2   Other important findings: n/a    Assessment:   No diagnosis found.    Plan:   Transplant Surgery Donor Candidacy:   Ms. Major remains a suitable candidate for kidney donation.    Based on the preoperative evaluation, a left robotic donor nephrectomy is planned.  Petra Galeas MD       Counseling:   I discussed with Ms. Major that donation is a voluntary activity and reiterated it should be done willingly and for altruistic reasons only.  I reviewed that no payment should be received for donating.  I also discussed that coercion, guilt, pressure, or feelings of obligation are not appropriate reasons to donate.  The option to withdraw at any time was emphasized.  Ms. Major was reminded that a medical  opt out can be given to protect her confidentiality, and no member of the transplant team will discuss specifics of her health or medical/social history with anyone else without permission.  The need for lifelong routine medical follow-up for optimal health, including routine health maintenance was reviewed.    I discussed the risks related to surgery including the risks related to anesthesia, bleeding, infection, inability for surgery to be performed laparoscopically, risks of reoperation as well as the risks of death.  We discussed the length of hospitalization, return to work times, as well as follow-up post-donation.    I also discussed the slight possibility that due to problems with the recipient operation, the transplant might not be able to be completed after the organ was already removed. If such a situation should arise, the donor prefers that the organ be transplanted into a suitable third-party recipient.

## 2018-11-27 NOTE — PROGRESS NOTES
DONOR PRE-OP NOTE    Potential Donor Name: Thea Major, [id number]  Encounter Date: 11/27/2018    Sex: female  YOB: 1960  Age: 58 y.o.    Housing/Contact Info:  [place] [id number]  Telephone Information:   Mobile [phone]    Home: [phone] (home)  Work: There is no work phone number on file.  E-mail: [email or web address]    Potential Surgery Date: 12-10-18  Potential Recipient Name: Figueroa Ontiveros, Clinic Number: [id number]  Potential Recipient Relationship: recipient is nephew    Patient presents as alert and oriented x 4, pleasant, good eye contact, well groomed, recall good, concentration/judgement good, average intelligence, calm, communicative, cooperative and asking and answering questions appropriately. Patient presents as a 58 y.o. year old female to donor pre-op appointment for scheduled kidney living donor surgery. Patient's sister accompanies patient.  Patient states that she is independent with ADLs at this time.  Patient states continued motivation to pursue organ donation at this time.    Does patient drive?  Patient is aware will not be able to drive until medically cleared by the transplant team. Patient verbalizes understanding that patient will need assistance for all transportation needs until medically cleared to drive.    Caregivers/Transportation:  In Hospital: Rosalinda Major, sister, Moise [place], does drive/own car, works full time as . [phone]  Once home: Ender Arroyo, 44 yo roommate, Moise [place], does drive/own car. [phone]    Dependents/Others who rely on Patient/Caregiver for care: patient denies    Cognitive:  Education: high school  Reading Level: 12th grade  Reports difficulty with: N/A pt reports does wear eyeglasses  Denies difficulty with: reading, writing, seeing, hearing, comprehension, learning and memory    Infusion Service: patient utilizing? no  Home Health: patient utilizing? no  DME:  patient  utilizing? no    Living Will: yes  Healthcare Power of : yes all sisters  Written LW/HCPA and verbal information presented to patient today.    Insurance: See potential recipient's insurance for donor coverage.    Patient's Insurance: Does potential donor have their own health insurance? BCBS through work, pharmacy usually uses [facility] in [facility].  Possible concerns regarding insurance post-donation reviewed. Patient verbalizes clear understanding.    Financial:  Employment: Patient is currently employed. Patient does plan to return to work once medically cleared. Pt reports works full time as an  with [employer].  Spouse/Significant Other Employment: pt reports is not   Patient states does not expect to have any financial problems following transplant surgery.  Patient states has not conducted fundraising to assist with donation costs.    Tobacco/Alcohol/Illicit Drug Abuse: Patient reports does not use tobacco products, illicit drugs and non-prescription medications and that patient does plan to remain abstinent. Pt reports uses alcohol occasionally.    Psychiatric History: patient reports has experienced anxiety in the past and takes Xanax .5mg as needed and Wellbutrin  mg daily prescribed by PCP as needed. Pt denies any overwhelming feelings of depression or anxiety at this time and denies need for mental health referral.    Coping: Patient states that she is coping well at this time and states does not have any concerns, questions, or needs. Patient states will call as needed and does understand how to access resources including the  as needed, both inpatient and outpatient.    Resources, information and support provided. Psychosocial aspects regarding organ donation and transplantation were discussed. Patient reports having a clear understanding of resources, information, support and psychosocial aspects related to organ donation.    Discharge  Plan: Patient to discharge to local [facility] for 2 days post hospitalization under the care of patient's sister Rosalinda post-organ transplant. Patient states that patient's sister Rosalinda will be present as caregiver in the hospital. Patient's sister Rosalinda will transport patient home. Patient states agreement with not driving and not returning to work until medically cleared to do so.    Patient continues to report having a clear understanding of confidentiality, option to not donate, alternative treatment options, importance of compliance, resources and resource limitations, insurance and insurance insurable limitations, educational information, and the risks involved. Patient understands that the transplant may or may not work, the transplant date/donation date may be cancelled or postponed, and the psychosocial factors involved before, during and after donation.    Patient states having clear understanding and realistic expectations regarding the potential risks and potential benefits of organ transplantation and organ donation. Patient agrees to further discuss with health care team members and support system members, as well as to utilize available resources and express questions and/or concerns. Resources and information provided and reviewed.    Patient denies feelings of coercion, pressure or obligation to donate. Patient states that she is not receiving compensation for organ donation.    Patient reports motivation to pursue organ donation at this time.    Suitability for Donation: At this time, patient presents as a a suitable candidate for organ donation. Patient states does have a caregiver plan, transportation plan, and lodging plan in place. Patient states that patient does have medical and prescription medicine insurance in place and does have a plan in place to afford post-transplant costs.    Patient provided verbal permission to release any necessary information to outside resources for patient care and  discharge planning.  Resources and information provided and reviewed.  Patient is choosing has no specific agency preferences.     provided psychosocial support, counseling, resources, education, assistance, and discharge planning.  remains available.    Recommendations/Additional Comments: Pt denies need for any employment paperwork completed for transplant due to time missed. Pt reports plan to use [facility] out pt pharmacy for out patient medicines at discharge, then if needed once home, her medicines to be filled at her local pharmacy.  Pt reports plan to drive into [facility] morning of surgery.  Patient reports once discharged from transplant hospitalization plan to stay at local [facility] for two days and can afford. Pt reports understanding she is financially responsible for all transplant costs not covered under medical insurance, including travel, meals and lodging.    Patient states is aware of [facility]'s affiliation and/or partnership with agencies in home health care, LTAC, SNF, [facility] other hospitals and clinics.    Vidya Liao MSW [facility]

## 2018-11-27 NOTE — PROGRESS NOTES
"   Transplant Surgery Kidney Donor Preoperative Evaluation    Subjective:   CC:  Preoperative evaluation before donor nephrectomy.    HPI:  Ms. Major is a 58 y.o. year old White female who has been approved to be a living related donor for her nephew.  She denies any changes in her health condition since her previous visit.      Past Medical History:   Diagnosis Date    Allergy     Anxiety     Hypertension 2010    Shingles      Past Surgical History:   Procedure Laterality Date    BREAST BIOPSY  1981    benign    DILATION AND CURETTAGE OF UTERUS  1993    TUBAL LIGATION  1996     Review of patient's allergies indicates:  No Known Allergies  Review of Systems   Constitutional: Negative for activity change, appetite change, chills, fatigue and fever.   HENT: Negative for sore throat and trouble swallowing.    Eyes: Negative for visual disturbance.   Respiratory: Negative for cough, chest tightness and shortness of breath.    Cardiovascular: Negative for chest pain, palpitations and leg swelling.   Gastrointestinal: Negative for abdominal distention, abdominal pain, blood in stool, constipation, diarrhea, nausea and vomiting.   Endocrine: Negative for polyuria.   Genitourinary: Negative for decreased urine volume, difficulty urinating, dysuria, flank pain, frequency and hematuria.   Musculoskeletal: Negative for gait problem, myalgias and neck stiffness.   Skin: Negative for rash and wound.   Neurological: Negative for dizziness, tremors, seizures, weakness, light-headedness and headaches.   Hematological: Negative for adenopathy.   Psychiatric/Behavioral: Negative for agitation, confusion and sleep disturbance.       Objective:   Blood pressure 122/79, pulse 77, temperature 98 °F (36.7 °C), temperature source Oral, resp. rate 18, height 5' 4.25" (1.632 m), weight 64.1 kg (141 lb 5 oz), SpO2 95 %.  Physical Exam   Constitutional: She is oriented to person, place, and time. She appears well-developed and " well-nourished. No distress.   HENT:   Head: Normocephalic.   Mouth/Throat: Oropharynx is clear and moist.   Eyes: Conjunctivae are normal. Pupils are equal, round, and reactive to light. No scleral icterus.   Neck: Normal range of motion. Neck supple. No tracheal deviation present. No thyromegaly present.   Cardiovascular: Normal rate, regular rhythm, normal heart sounds and intact distal pulses.   No murmur heard.  Pulmonary/Chest: Effort normal and breath sounds normal. No respiratory distress.   No supraclavicular adenopathy   Abdominal: Soft. Bowel sounds are normal. She exhibits no distension and no mass. There is no tenderness. There is no rebound and no guarding.   No inguinal adenopathy   Musculoskeletal: Normal range of motion. She exhibits no edema.   No clubbing or cyanosis   Lymphadenopathy:     She has no cervical adenopathy.   Neurological: She is alert and oriented to person, place, and time.   Skin: Skin is warm and dry. No rash noted. No erythema.   Psychiatric: She has a normal mood and affect. Her behavior is normal.   Vitals reviewed.      ABO type: O POS    Imaging Studies:  Nuclear split function renal scan:   Left:  48%   Right: 52%  CT angiogram:   Left renal arteries: 2   Right renal arteries: 2   Other important findings: n/a    Assessment:   No diagnosis found.    Plan:   Transplant Surgery Donor Candidacy:   Ms. Major remains a suitable candidate for kidney donation.    Based on the preoperative evaluation, a left robotic donor nephrectomy is planned.  Petra Galeas MD       Counseling:   I discussed with Ms. Major that donation is a voluntary activity and reiterated it should be done willingly and for altruistic reasons only.  I reviewed that no payment should be received for donating.  I also discussed that coercion, guilt, pressure, or feelings of obligation are not appropriate reasons to donate.  The option to withdraw at any time was emphasized.  Ms. Major was  reminded that a medical opt out can be given to protect her confidentiality, and no member of the transplant team will discuss specifics of her health or medical/social history with anyone else without permission.  The need for lifelong routine medical follow-up for optimal health, including routine health maintenance was reviewed.    I discussed the risks related to surgery including the risks related to anesthesia, bleeding, infection, inability for surgery to be performed laparoscopically, risks of reoperation as well as the risks of death.  We discussed the length of hospitalization, return to work times, as well as follow-up post-donation.    I also discussed the slight possibility that due to problems with the recipient operation, the transplant might not be able to be completed after the organ was already removed. If such a situation should arise, the donor prefers that the organ be transplanted into a suitable third-party recipient.

## 2018-11-28 DIAGNOSIS — Z00.5 TRANSPLANT DONOR EVALUATION: Primary | ICD-10-CM

## 2018-11-29 NOTE — PROGRESS NOTES
PRE-OP TEACHING NOTE    Thea Major is here today for pre-op appointments.  Pre-op instructions reviewed and written information was provided.  All questions were answered.  Discussed possibility that surgery may be rescheduled if the program is busy with  donor transplants.  Patient agreed and verbalized understanding of all instructions.

## 2018-11-30 ENCOUNTER — LAB VISIT (OUTPATIENT)
Dept: LAB | Facility: HOSPITAL | Age: 58
End: 2018-11-30
Attending: INTERNAL MEDICINE
Payer: COMMERCIAL

## 2018-11-30 DIAGNOSIS — Z00.5 TRANSPLANT DONOR EVALUATION: ICD-10-CM

## 2018-11-30 PROCEDURE — 87517 HEPATITIS B DNA QUANT: CPT | Mod: TXP

## 2018-11-30 PROCEDURE — 36415 COLL VENOUS BLD VENIPUNCTURE: CPT | Mod: PO,TXP

## 2018-12-03 LAB
HBV DNA SERPL NAA+PROBE-ACNC: <10 IU/ML
HBV DNA SERPL NAA+PROBE-LOG IU: <1 LOG (10) IU/ML
HBV DNA SERPL QL NAA+PROBE: NOT DETECTED

## 2018-12-10 ENCOUNTER — ANESTHESIA (OUTPATIENT)
Dept: SURGERY | Facility: HOSPITAL | Age: 58
DRG: 661 | End: 2018-12-10
Payer: OTHER GOVERNMENT

## 2018-12-10 ENCOUNTER — HOSPITAL ENCOUNTER (INPATIENT)
Facility: HOSPITAL | Age: 58
LOS: 1 days | Discharge: HOME | DRG: 661 | End: 2018-12-11
Attending: TRANSPLANT SURGERY | Admitting: TRANSPLANT SURGERY
Payer: COMMERCIAL

## 2018-12-10 DIAGNOSIS — Z00.5 WILLING TO BE KIDNEY DONOR: Primary | ICD-10-CM

## 2018-12-10 LAB — HCT VFR BLD AUTO: 43 %

## 2018-12-10 PROCEDURE — 25000003 PHARM REV CODE 250: Performed by: PEDIATRICS

## 2018-12-10 PROCEDURE — 63600175 PHARM REV CODE 636 W HCPCS: Mod: NTX | Performed by: TRANSPLANT SURGERY

## 2018-12-10 PROCEDURE — 63600175 PHARM REV CODE 636 W HCPCS: Performed by: NURSE ANESTHETIST, CERTIFIED REGISTERED

## 2018-12-10 PROCEDURE — D9220A PRA ANESTHESIA: Mod: CRNA,,, | Performed by: NURSE ANESTHETIST, CERTIFIED REGISTERED

## 2018-12-10 PROCEDURE — 0TT14ZZ RESECTION OF LEFT KIDNEY, PERCUTANEOUS ENDOSCOPIC APPROACH: ICD-10-PCS | Performed by: TRANSPLANT SURGERY

## 2018-12-10 PROCEDURE — 94761 N-INVAS EAR/PLS OXIMETRY MLT: CPT

## 2018-12-10 PROCEDURE — 63600175 PHARM REV CODE 636 W HCPCS: Performed by: ANESTHESIOLOGY

## 2018-12-10 PROCEDURE — 27201423 OPTIME MED/SURG SUP & DEVICES STERILE SUPPLY: Performed by: TRANSPLANT SURGERY

## 2018-12-10 PROCEDURE — 25000003 PHARM REV CODE 250: Performed by: NURSE ANESTHETIST, CERTIFIED REGISTERED

## 2018-12-10 PROCEDURE — 36415 COLL VENOUS BLD VENIPUNCTURE: CPT

## 2018-12-10 PROCEDURE — 37000008 HC ANESTHESIA 1ST 15 MINUTES: Performed by: TRANSPLANT SURGERY

## 2018-12-10 PROCEDURE — 25000003 PHARM REV CODE 250: Performed by: TRANSPLANT SURGERY

## 2018-12-10 PROCEDURE — 36000712 HC OR TIME LEV V 1ST 15 MIN: Performed by: TRANSPLANT SURGERY

## 2018-12-10 PROCEDURE — 25000003 PHARM REV CODE 250: Performed by: NURSE PRACTITIONER

## 2018-12-10 PROCEDURE — 37000009 HC ANESTHESIA EA ADD 15 MINS: Performed by: TRANSPLANT SURGERY

## 2018-12-10 PROCEDURE — 63600175 PHARM REV CODE 636 W HCPCS: Performed by: STUDENT IN AN ORGANIZED HEALTH CARE EDUCATION/TRAINING PROGRAM

## 2018-12-10 PROCEDURE — 85014 HEMATOCRIT: CPT | Mod: 91

## 2018-12-10 PROCEDURE — 71000033 HC RECOVERY, INTIAL HOUR: Performed by: TRANSPLANT SURGERY

## 2018-12-10 PROCEDURE — 27000221 HC OXYGEN, UP TO 24 HOURS

## 2018-12-10 PROCEDURE — S5010 5% DEXTROSE AND 0.45% SALINE: HCPCS | Performed by: PEDIATRICS

## 2018-12-10 PROCEDURE — 63600175 PHARM REV CODE 636 W HCPCS: Performed by: PEDIATRICS

## 2018-12-10 PROCEDURE — 8E0W4CZ ROBOTIC ASSISTED PROCEDURE OF TRUNK REGION, PERCUTANEOUS ENDOSCOPIC APPROACH: ICD-10-PCS | Performed by: TRANSPLANT SURGERY

## 2018-12-10 PROCEDURE — 50547 LAPARO REMOVAL DONOR KIDNEY: CPT | Mod: 82,LT,, | Performed by: TRANSPLANT SURGERY

## 2018-12-10 PROCEDURE — 36000713 HC OR TIME LEV V EA ADD 15 MIN: Performed by: TRANSPLANT SURGERY

## 2018-12-10 PROCEDURE — 71000039 HC RECOVERY, EACH ADD'L HOUR: Performed by: TRANSPLANT SURGERY

## 2018-12-10 PROCEDURE — 50547 LAPARO REMOVAL DONOR KIDNEY: CPT | Mod: LT,,, | Performed by: TRANSPLANT SURGERY

## 2018-12-10 PROCEDURE — 20600001 HC STEP DOWN PRIVATE ROOM

## 2018-12-10 PROCEDURE — D9220A PRA ANESTHESIA: Mod: ANES,,, | Performed by: ANESTHESIOLOGY

## 2018-12-10 RX ORDER — FUROSEMIDE 10 MG/ML
INJECTION INTRAMUSCULAR; INTRAVENOUS
Status: DISCONTINUED | OUTPATIENT
Start: 2018-12-10 | End: 2018-12-11

## 2018-12-10 RX ORDER — BISACODYL 5 MG
10 TABLET, DELAYED RELEASE (ENTERIC COATED) ORAL NIGHTLY
Status: DISCONTINUED | OUTPATIENT
Start: 2018-12-10 | End: 2018-12-11 | Stop reason: HOSPADM

## 2018-12-10 RX ORDER — CEFAZOLIN SODIUM 1 G/3ML
2 INJECTION, POWDER, FOR SOLUTION INTRAMUSCULAR; INTRAVENOUS
Status: COMPLETED | OUTPATIENT
Start: 2018-12-10 | End: 2018-12-11

## 2018-12-10 RX ORDER — KETOROLAC TROMETHAMINE 30 MG/ML
15 INJECTION, SOLUTION INTRAMUSCULAR; INTRAVENOUS EVERY 6 HOURS
Status: CANCELLED | OUTPATIENT
Start: 2018-12-10 | End: 2018-12-12

## 2018-12-10 RX ORDER — LIDOCAINE HCL/PF 100 MG/5ML
SYRINGE (ML) INTRAVENOUS
Status: DISCONTINUED | OUTPATIENT
Start: 2018-12-10 | End: 2018-12-11

## 2018-12-10 RX ORDER — OXYCODONE AND ACETAMINOPHEN 5; 325 MG/1; MG/1
2 TABLET ORAL EVERY 4 HOURS PRN
Status: DISCONTINUED | OUTPATIENT
Start: 2018-12-10 | End: 2018-12-10

## 2018-12-10 RX ORDER — KETOROLAC TROMETHAMINE 30 MG/ML
15 INJECTION, SOLUTION INTRAMUSCULAR; INTRAVENOUS EVERY 6 HOURS
Status: DISCONTINUED | OUTPATIENT
Start: 2018-12-10 | End: 2018-12-11 | Stop reason: HOSPADM

## 2018-12-10 RX ORDER — OXYCODONE AND ACETAMINOPHEN 10; 325 MG/1; MG/1
1 TABLET ORAL EVERY 4 HOURS PRN
Status: DISCONTINUED | OUTPATIENT
Start: 2018-12-10 | End: 2018-12-11 | Stop reason: HOSPADM

## 2018-12-10 RX ORDER — ACETAMINOPHEN 10 MG/ML
INJECTION, SOLUTION INTRAVENOUS
Status: DISCONTINUED | OUTPATIENT
Start: 2018-12-10 | End: 2018-12-11

## 2018-12-10 RX ORDER — BUPIVACAINE HYDROCHLORIDE 2.5 MG/ML
INJECTION, SOLUTION EPIDURAL; INFILTRATION; INTRACAUDAL
Status: DISCONTINUED | OUTPATIENT
Start: 2018-12-10 | End: 2018-12-10

## 2018-12-10 RX ORDER — DEXAMETHASONE SODIUM PHOSPHATE 4 MG/ML
INJECTION, SOLUTION INTRA-ARTICULAR; INTRALESIONAL; INTRAMUSCULAR; INTRAVENOUS; SOFT TISSUE
Status: DISCONTINUED | OUTPATIENT
Start: 2018-12-10 | End: 2018-12-11

## 2018-12-10 RX ORDER — HEPARIN SODIUM 5000 [USP'U]/ML
5000 INJECTION, SOLUTION INTRAVENOUS; SUBCUTANEOUS
Status: COMPLETED | OUTPATIENT
Start: 2018-12-10 | End: 2018-12-10

## 2018-12-10 RX ORDER — CEFAZOLIN SODIUM 1 G/3ML
2 INJECTION, POWDER, FOR SOLUTION INTRAMUSCULAR; INTRAVENOUS
Status: COMPLETED | OUTPATIENT
Start: 2018-12-10 | End: 2018-12-10

## 2018-12-10 RX ORDER — LORAZEPAM 2 MG/ML
0.25 INJECTION INTRAMUSCULAR ONCE AS NEEDED
Status: DISCONTINUED | OUTPATIENT
Start: 2018-12-10 | End: 2018-12-10 | Stop reason: HOSPADM

## 2018-12-10 RX ORDER — OXYCODONE AND ACETAMINOPHEN 5; 325 MG/1; MG/1
1 TABLET ORAL EVERY 4 HOURS PRN
Status: DISCONTINUED | OUTPATIENT
Start: 2018-12-10 | End: 2018-12-11 | Stop reason: HOSPADM

## 2018-12-10 RX ORDER — SODIUM CHLORIDE 0.9 % (FLUSH) 0.9 %
3 SYRINGE (ML) INJECTION
Status: DISCONTINUED | OUTPATIENT
Start: 2018-12-10 | End: 2018-12-11 | Stop reason: HOSPADM

## 2018-12-10 RX ORDER — HEPARIN SODIUM 5000 [USP'U]/ML
5000 INJECTION, SOLUTION INTRAVENOUS; SUBCUTANEOUS EVERY 8 HOURS
Status: DISCONTINUED | OUTPATIENT
Start: 2018-12-10 | End: 2018-12-11 | Stop reason: HOSPADM

## 2018-12-10 RX ORDER — DOCUSATE SODIUM 100 MG/1
100 CAPSULE, LIQUID FILLED ORAL 3 TIMES DAILY
Status: DISCONTINUED | OUTPATIENT
Start: 2018-12-10 | End: 2018-12-11 | Stop reason: HOSPADM

## 2018-12-10 RX ORDER — DIPHENHYDRAMINE HCL 25 MG
25 CAPSULE ORAL EVERY 6 HOURS PRN
Status: DISCONTINUED | OUTPATIENT
Start: 2018-12-10 | End: 2018-12-11 | Stop reason: HOSPADM

## 2018-12-10 RX ORDER — PROPOFOL 10 MG/ML
VIAL (ML) INTRAVENOUS
Status: DISCONTINUED | OUTPATIENT
Start: 2018-12-10 | End: 2018-12-11

## 2018-12-10 RX ORDER — SODIUM CHLORIDE 9 MG/ML
INJECTION, SOLUTION INTRAVENOUS CONTINUOUS PRN
Status: DISCONTINUED | OUTPATIENT
Start: 2018-12-10 | End: 2018-12-11

## 2018-12-10 RX ORDER — HEPARIN SODIUM 5000 [USP'U]/ML
5000 INJECTION, SOLUTION INTRAVENOUS; SUBCUTANEOUS EVERY 8 HOURS
Status: CANCELLED | OUTPATIENT
Start: 2018-12-10

## 2018-12-10 RX ORDER — ROCURONIUM BROMIDE 10 MG/ML
INJECTION, SOLUTION INTRAVENOUS
Status: DISCONTINUED | OUTPATIENT
Start: 2018-12-10 | End: 2018-12-11

## 2018-12-10 RX ORDER — ONDANSETRON 2 MG/ML
4 INJECTION INTRAMUSCULAR; INTRAVENOUS EVERY 6 HOURS PRN
Status: DISCONTINUED | OUTPATIENT
Start: 2018-12-10 | End: 2018-12-11 | Stop reason: HOSPADM

## 2018-12-10 RX ORDER — ONDANSETRON 8 MG/1
8 TABLET, ORALLY DISINTEGRATING ORAL EVERY 6 HOURS PRN
Status: DISCONTINUED | OUTPATIENT
Start: 2018-12-10 | End: 2018-12-11 | Stop reason: HOSPADM

## 2018-12-10 RX ORDER — DEXTROSE MONOHYDRATE AND SODIUM CHLORIDE 5; .45 G/100ML; G/100ML
INJECTION, SOLUTION INTRAVENOUS CONTINUOUS
Status: DISCONTINUED | OUTPATIENT
Start: 2018-12-10 | End: 2018-12-11

## 2018-12-10 RX ORDER — PHENYLEPHRINE HYDROCHLORIDE 10 MG/ML
INJECTION INTRAVENOUS
Status: DISCONTINUED | OUTPATIENT
Start: 2018-12-10 | End: 2018-12-11

## 2018-12-10 RX ORDER — DEXTROSE MONOHYDRATE AND SODIUM CHLORIDE 5; .45 G/100ML; G/100ML
INJECTION, SOLUTION INTRAVENOUS CONTINUOUS
Status: CANCELLED | OUTPATIENT
Start: 2018-12-10

## 2018-12-10 RX ORDER — CALCIUM CARBONATE 200(500)MG
500 TABLET,CHEWABLE ORAL 3 TIMES DAILY PRN
Status: DISCONTINUED | OUTPATIENT
Start: 2018-12-10 | End: 2018-12-11 | Stop reason: HOSPADM

## 2018-12-10 RX ORDER — MONTELUKAST SODIUM 10 MG/1
10 TABLET ORAL NIGHTLY
Status: DISCONTINUED | OUTPATIENT
Start: 2018-12-10 | End: 2018-12-11 | Stop reason: HOSPADM

## 2018-12-10 RX ORDER — OXYCODONE AND ACETAMINOPHEN 5; 325 MG/1; MG/1
2 TABLET ORAL EVERY 4 HOURS PRN
Status: CANCELLED | OUTPATIENT
Start: 2018-12-10

## 2018-12-10 RX ORDER — ONDANSETRON 2 MG/ML
INJECTION INTRAMUSCULAR; INTRAVENOUS
Status: DISCONTINUED | OUTPATIENT
Start: 2018-12-10 | End: 2018-12-11

## 2018-12-10 RX ORDER — CEFAZOLIN SODIUM 1 G/3ML
2 INJECTION, POWDER, FOR SOLUTION INTRAMUSCULAR; INTRAVENOUS
Status: CANCELLED | OUTPATIENT
Start: 2018-12-10 | End: 2018-12-11

## 2018-12-10 RX ORDER — KETAMINE HCL IN 0.9 % NACL 50 MG/5 ML
SYRINGE (ML) INTRAVENOUS
Status: DISCONTINUED | OUTPATIENT
Start: 2018-12-10 | End: 2018-12-11

## 2018-12-10 RX ORDER — OXYCODONE AND ACETAMINOPHEN 5; 325 MG/1; MG/1
1 TABLET ORAL EVERY 4 HOURS PRN
Status: CANCELLED | OUTPATIENT
Start: 2018-12-10

## 2018-12-10 RX ORDER — FENTANYL CITRATE 50 UG/ML
INJECTION, SOLUTION INTRAMUSCULAR; INTRAVENOUS
Status: DISCONTINUED | OUTPATIENT
Start: 2018-12-10 | End: 2018-12-11

## 2018-12-10 RX ORDER — DIPHENHYDRAMINE HYDROCHLORIDE 50 MG/ML
INJECTION INTRAMUSCULAR; INTRAVENOUS
Status: DISPENSED
Start: 2018-12-10 | End: 2018-12-11

## 2018-12-10 RX ORDER — HYDROMORPHONE HYDROCHLORIDE 1 MG/ML
0.2 INJECTION, SOLUTION INTRAMUSCULAR; INTRAVENOUS; SUBCUTANEOUS EVERY 5 MIN PRN
Status: DISCONTINUED | OUTPATIENT
Start: 2018-12-10 | End: 2018-12-10 | Stop reason: HOSPADM

## 2018-12-10 RX ORDER — DIPHENHYDRAMINE HYDROCHLORIDE 50 MG/ML
25 INJECTION INTRAMUSCULAR; INTRAVENOUS ONCE
Status: COMPLETED | OUTPATIENT
Start: 2018-12-10 | End: 2018-12-10

## 2018-12-10 RX ORDER — MIDAZOLAM HYDROCHLORIDE 1 MG/ML
INJECTION, SOLUTION INTRAMUSCULAR; INTRAVENOUS
Status: DISCONTINUED | OUTPATIENT
Start: 2018-12-10 | End: 2018-12-11

## 2018-12-10 RX ADMIN — ROCURONIUM BROMIDE 50 MG: 10 INJECTION, SOLUTION INTRAVENOUS at 12:12

## 2018-12-10 RX ADMIN — SUGAMMADEX 200 MG: 100 INJECTION, SOLUTION INTRAVENOUS at 04:12

## 2018-12-10 RX ADMIN — HEPARIN SODIUM 5000 UNITS: 5000 INJECTION, SOLUTION INTRAVENOUS; SUBCUTANEOUS at 10:12

## 2018-12-10 RX ADMIN — HYDROMORPHONE HYDROCHLORIDE 0.2 MG: 1 INJECTION, SOLUTION INTRAMUSCULAR; INTRAVENOUS; SUBCUTANEOUS at 05:12

## 2018-12-10 RX ADMIN — BISACODYL 10 MG: 5 TABLET, COATED ORAL at 08:12

## 2018-12-10 RX ADMIN — FUROSEMIDE 10 MG: 10 INJECTION, SOLUTION INTRAMUSCULAR; INTRAVENOUS at 03:12

## 2018-12-10 RX ADMIN — ROCURONIUM BROMIDE 10 MG: 10 INJECTION, SOLUTION INTRAVENOUS at 01:12

## 2018-12-10 RX ADMIN — DEXAMETHASONE SODIUM PHOSPHATE 4 MG: 4 INJECTION, SOLUTION INTRAMUSCULAR; INTRAVENOUS at 04:12

## 2018-12-10 RX ADMIN — PROPOFOL 170 MG: 10 INJECTION, EMULSION INTRAVENOUS at 12:12

## 2018-12-10 RX ADMIN — SODIUM CHLORIDE, SODIUM GLUCONATE, SODIUM ACETATE, POTASSIUM CHLORIDE, MAGNESIUM CHLORIDE, SODIUM PHOSPHATE, DIBASIC, AND POTASSIUM PHOSPHATE: .53; .5; .37; .037; .03; .012; .00082 INJECTION, SOLUTION INTRAVENOUS at 03:12

## 2018-12-10 RX ADMIN — HEPARIN SODIUM 5000 UNITS: 5000 INJECTION, SOLUTION INTRAVENOUS; SUBCUTANEOUS at 09:12

## 2018-12-10 RX ADMIN — OXYCODONE AND ACETAMINOPHEN 2 TABLET: 5; 325 TABLET ORAL at 05:12

## 2018-12-10 RX ADMIN — DIPHENHYDRAMINE HYDROCHLORIDE 25 MG: 25 CAPSULE ORAL at 07:12

## 2018-12-10 RX ADMIN — DIPHENHYDRAMINE HYDROCHLORIDE 25 MG: 50 INJECTION, SOLUTION INTRAMUSCULAR; INTRAVENOUS at 06:12

## 2018-12-10 RX ADMIN — CALCIUM CARBONATE (ANTACID) CHEW TAB 500 MG 500 MG: 500 CHEW TAB at 10:12

## 2018-12-10 RX ADMIN — MONTELUKAST SODIUM 10 MG: 10 TABLET, FILM COATED ORAL at 08:12

## 2018-12-10 RX ADMIN — FENTANYL CITRATE 100 MCG: 50 INJECTION, SOLUTION INTRAMUSCULAR; INTRAVENOUS at 04:12

## 2018-12-10 RX ADMIN — ONDANSETRON 4 MG: 2 INJECTION INTRAMUSCULAR; INTRAVENOUS at 04:12

## 2018-12-10 RX ADMIN — FENTANYL CITRATE 100 MCG: 50 INJECTION, SOLUTION INTRAMUSCULAR; INTRAVENOUS at 12:12

## 2018-12-10 RX ADMIN — ROCURONIUM BROMIDE 20 MG: 10 INJECTION, SOLUTION INTRAVENOUS at 03:12

## 2018-12-10 RX ADMIN — PHENYLEPHRINE HYDROCHLORIDE 200 MCG: 10 INJECTION INTRAVENOUS at 04:12

## 2018-12-10 RX ADMIN — DEXTROSE AND SODIUM CHLORIDE: 5; .45 INJECTION, SOLUTION INTRAVENOUS at 05:12

## 2018-12-10 RX ADMIN — FENTANYL CITRATE 50 MCG: 50 INJECTION, SOLUTION INTRAMUSCULAR; INTRAVENOUS at 02:12

## 2018-12-10 RX ADMIN — OXYCODONE HYDROCHLORIDE AND ACETAMINOPHEN 1 TABLET: 5; 325 TABLET ORAL at 09:12

## 2018-12-10 RX ADMIN — ROCURONIUM BROMIDE 20 MG: 10 INJECTION, SOLUTION INTRAVENOUS at 04:12

## 2018-12-10 RX ADMIN — DOCUSATE SODIUM 100 MG: 100 CAPSULE, LIQUID FILLED ORAL at 08:12

## 2018-12-10 RX ADMIN — KETOROLAC TROMETHAMINE 15 MG: 30 INJECTION, SOLUTION INTRAMUSCULAR; INTRAVENOUS at 11:12

## 2018-12-10 RX ADMIN — SODIUM CHLORIDE, SODIUM GLUCONATE, SODIUM ACETATE, POTASSIUM CHLORIDE, MAGNESIUM CHLORIDE, SODIUM PHOSPHATE, DIBASIC, AND POTASSIUM PHOSPHATE: .53; .5; .37; .037; .03; .012; .00082 INJECTION, SOLUTION INTRAVENOUS at 12:12

## 2018-12-10 RX ADMIN — CEFAZOLIN 2 G: 330 INJECTION, POWDER, FOR SOLUTION INTRAMUSCULAR; INTRAVENOUS at 01:12

## 2018-12-10 RX ADMIN — ACETAMINOPHEN 1000 MG: 10 INJECTION, SOLUTION INTRAVENOUS at 01:12

## 2018-12-10 RX ADMIN — MIDAZOLAM HYDROCHLORIDE 2 MG: 1 INJECTION, SOLUTION INTRAMUSCULAR; INTRAVENOUS at 12:12

## 2018-12-10 RX ADMIN — CEFAZOLIN 2 G: 330 INJECTION, POWDER, FOR SOLUTION INTRAMUSCULAR; INTRAVENOUS at 09:12

## 2018-12-10 RX ADMIN — Medication 25 MG: at 01:12

## 2018-12-10 RX ADMIN — FENTANYL CITRATE 50 MCG: 50 INJECTION, SOLUTION INTRAMUSCULAR; INTRAVENOUS at 12:12

## 2018-12-10 RX ADMIN — ROCURONIUM BROMIDE 20 MG: 10 INJECTION, SOLUTION INTRAVENOUS at 02:12

## 2018-12-10 RX ADMIN — LIDOCAINE HYDROCHLORIDE 90 MG: 20 INJECTION, SOLUTION INTRAVENOUS at 12:12

## 2018-12-10 RX ADMIN — KETOROLAC TROMETHAMINE 15 MG: 30 INJECTION, SOLUTION INTRAMUSCULAR; INTRAVENOUS at 05:12

## 2018-12-10 RX ADMIN — PROPOFOL 30 MG: 10 INJECTION, EMULSION INTRAVENOUS at 04:12

## 2018-12-10 RX ADMIN — SODIUM CHLORIDE: 0.9 INJECTION, SOLUTION INTRAVENOUS at 11:12

## 2018-12-10 NOTE — OP NOTE
Operative Report    Date of Procedure: 12/10/2018    Surgeons:  Surgeon(s) and Role:     * Petra Galeas MD - Primary     * Navdeep Merritt MD - Assisting     * Ho Ayers MD - Fellow    First Assistant Attestation:  The presence of an additional attending surgeon functioning as first assistant was required due to the complexity of the procedure relative to any available residents. I certify that no resident was available who was qualified to serve as first assistant. Duties performed by the assistant included assisting the primary surgeon.    Pre-operative Diagnosis: Living Kidney Donor  Post-operative Diagnosis: Same  Procedure(s) Performed:   Left Kidney  robotic donor nephrectomy    Anesthesia: General endotracheal    Preamble  Indications and Patient Counseling: The patient is a 58 y.o. year-old female who has been evaluated as a living kidney donor.  A left  robotic nephrectomy is planned.  The patient has been thoroughly informed regarding the risks of the procedure, including death, serious surgical complications, bleeding, and reoperation.  She has also been informed of the possible need for conversion to open surgery.  She is aware that kidney donation is completely voluntary, and denies any coercion or motive for donating other than a sincere desire to benefit the recipient.  The patient voices understanding and agrees to proceed with the planned procedure.    ABO Confirmation: Prior to proceeding with donor nephrectomy, a formal ABO confirmation was done according to hospital and UNOS policies.  I confirmed the UNOS ID number of the donor organ and the donor and recipient ABO types.  This confirmation was personally done by an attending surgeon and circulating nurse, and is officially documented elsewhere.    Time-Out: A complete time out was carried out prior to incision, with confirmation of patient identity, correct procedure, correct operative site, appropriate antibiotic prophylaxis, review  of any known allergies, and presence of all needed equipment.    Procedure in Detail  Following the induction of general endotracheal anesthesia, the patient was positioned in a right lateral decubitus position with the table flexed.  The abdomen and left flank were sterilely prepped and draped.  A balloon-tipped 12 mm laparoscopic port was introduced just below the umbilicus using an open Shady technique.  The abdomen was then insufflated to 15 mmHg pressure.  Three additional ports were then placed consisting of a 12 mm port to the left of the umbilicus, an 8 mm da Iker port just below the left costal margin near the mid axillary line and another 8 mm da Iker port just medial to the anterior superior iliac spine.  The da Iker robotic system was then docked to these ports.  Dissection was then carried out using the da Iker system.  The colon was mobilized down to the pelvic brim.  Gerota's fascia was then opened and the left renal vein was identified.  Lumbar, gonadal, and adrenal tributaries were clipped and divided as appropriate.  The left renal artery was identified as proximally as feasible.  The adrenal gland was dissected away from the superior pole of the kidney.  The ureter was swept upwards off of the psoas muscle with care taken to avoid traumatizing it.  The posterior and lateral attachments of the kidney were then taken down.  An appropriately-sized vertical incision was created just below the umbilicus for extraction of the kidney.  This was extended just large enough to admit the surgeon's hand.  The Gelport device was placed at this point.  The abdomen was re-insufflated, and the kidney was inspected with conventional laparoscopy to ensure that all non-vascular attachments hand been taken down.  Additional cautery dissection was done as needed.  The patient was given 10 mg of Lasix to ensure a brisk diuresis.  The ureter was then divided distally using a vascular Endo-YUAN stapler.  After this  was done, the  renal artery was divided with a vascular stapler followed by the renal vein.  The kidney was then removed through the Gelport and immediately placed on ice and flushed with ice cold UW solution. Attention was then directed to the operative field.  The operative field was thoroughly irrigated and assessed for hemostasis. The port sites were assessed for size of the fascial defect, and external suturing or a specifically designed closure product was used as appropriate.  The kidney extraction incision was closed with continuous #1 PDS.  All incisions were infiltrated with bupivicaine. Skin incisions were closed with 4-0 subcuticular Monocryl.  The patient was then taken from the Operating Room in satisfactory condition.  Prior to the conclusion of the procedure, I was told that all sponge, needle and instrument counts were correct.      Confirmation of Intended Recipient: Prior to the kidney leaving the donor operating room, the correct intended recipient was verified by the attending surgeon and the circulating nurse.      Estimated Blood Loss: 100 mL  Drains: None  Specimens: none    Findings:  Healthy-appearing kidney  Arterial anatomy: Double  Venous anatomy: Single  Ureteral anatomy: Single      Times:  Console start:  12/10/2018  1:39 PM  Console finish: 12/10/2018  3:17 PM  Artery divided:  12/10/2018  3:34 PM  Kidney on ice:  12/10/2018  3:35 PM  Flush begin:  12/10/2018  3:36 PM  Flush end:  12/10/2018  3:38 PM  Kidney out of room: 12/10/2018  4:20 PM

## 2018-12-11 VITALS
WEIGHT: 148.13 LBS | RESPIRATION RATE: 15 BRPM | BODY MASS INDEX: 25.29 KG/M2 | DIASTOLIC BLOOD PRESSURE: 71 MMHG | SYSTOLIC BLOOD PRESSURE: 111 MMHG | TEMPERATURE: 99 F | HEART RATE: 74 BPM | HEIGHT: 64 IN | OXYGEN SATURATION: 98 %

## 2018-12-11 DIAGNOSIS — Z00.5 TRANSPLANT DONOR EVALUATION: Primary | ICD-10-CM

## 2018-12-11 LAB
ALBUMIN SERPL BCP-MCNC: 2.9 G/DL
ALP SERPL-CCNC: 50 U/L
ALT SERPL W/O P-5'-P-CCNC: 13 U/L
ANION GAP SERPL CALC-SCNC: 7 MMOL/L
AST SERPL-CCNC: 27 U/L
BASOPHILS # BLD AUTO: 0.01 K/UL
BASOPHILS NFR BLD: 0.1 %
BILIRUB SERPL-MCNC: 0.4 MG/DL
BUN SERPL-MCNC: 9 MG/DL
CALCIUM SERPL-MCNC: 8 MG/DL
CHLORIDE SERPL-SCNC: 100 MMOL/L
CO2 SERPL-SCNC: 23 MMOL/L
CREAT SERPL-MCNC: 1 MG/DL
DIFFERENTIAL METHOD: ABNORMAL
EOSINOPHIL # BLD AUTO: 0 K/UL
EOSINOPHIL NFR BLD: 0 %
ERYTHROCYTE [DISTWIDTH] IN BLOOD BY AUTOMATED COUNT: 13.2 %
EST. GFR  (AFRICAN AMERICAN): >60 ML/MIN/1.73 M^2
EST. GFR  (NON AFRICAN AMERICAN): >60 ML/MIN/1.73 M^2
GLUCOSE SERPL-MCNC: 131 MG/DL
HCT VFR BLD AUTO: 33.2 %
HCT VFR BLD AUTO: 33.5 %
HCT VFR BLD AUTO: 36.8 %
HGB BLD-MCNC: 11 G/DL
IMM GRANULOCYTES # BLD AUTO: 0.03 K/UL
IMM GRANULOCYTES NFR BLD AUTO: 0.4 %
LYMPHOCYTES # BLD AUTO: 1 K/UL
LYMPHOCYTES NFR BLD: 13.1 %
MCH RBC QN AUTO: 30.1 PG
MCHC RBC AUTO-ENTMCNC: 33.1 G/DL
MCV RBC AUTO: 91 FL
MONOCYTES # BLD AUTO: 0.6 K/UL
MONOCYTES NFR BLD: 7.3 %
NEUTROPHILS # BLD AUTO: 6.2 K/UL
NEUTROPHILS NFR BLD: 79.1 %
NRBC BLD-RTO: 0 /100 WBC
PLATELET # BLD AUTO: 212 K/UL
PMV BLD AUTO: 9.7 FL
POTASSIUM SERPL-SCNC: 4 MMOL/L
PROT SERPL-MCNC: 5.4 G/DL
RBC # BLD AUTO: 3.65 M/UL
SODIUM SERPL-SCNC: 130 MMOL/L
WBC # BLD AUTO: 7.85 K/UL

## 2018-12-11 PROCEDURE — 85014 HEMATOCRIT: CPT

## 2018-12-11 PROCEDURE — 80053 COMPREHEN METABOLIC PANEL: CPT

## 2018-12-11 PROCEDURE — 85025 COMPLETE CBC W/AUTO DIFF WBC: CPT

## 2018-12-11 PROCEDURE — 36415 COLL VENOUS BLD VENIPUNCTURE: CPT

## 2018-12-11 PROCEDURE — 25000003 PHARM REV CODE 250: Performed by: NURSE PRACTITIONER

## 2018-12-11 PROCEDURE — S5010 5% DEXTROSE AND 0.45% SALINE: HCPCS | Performed by: PEDIATRICS

## 2018-12-11 PROCEDURE — 99239 HOSP IP/OBS DSCHRG MGMT >30: CPT | Mod: ,,, | Performed by: PHYSICIAN ASSISTANT

## 2018-12-11 PROCEDURE — 63600175 PHARM REV CODE 636 W HCPCS: Performed by: PEDIATRICS

## 2018-12-11 PROCEDURE — 25000003 PHARM REV CODE 250: Performed by: PEDIATRICS

## 2018-12-11 RX ORDER — OXYCODONE AND ACETAMINOPHEN 5; 325 MG/1; MG/1
1-2 TABLET ORAL EVERY 4 HOURS PRN
Qty: 40 TABLET | Refills: 0 | Status: SHIPPED | OUTPATIENT
Start: 2018-12-11 | End: 2022-10-25

## 2018-12-11 RX ORDER — ONDANSETRON 8 MG/1
8 TABLET, ORALLY DISINTEGRATING ORAL EVERY 8 HOURS PRN
Qty: 30 TABLET | Refills: 0 | Status: SHIPPED | OUTPATIENT
Start: 2018-12-11 | End: 2023-07-06 | Stop reason: ALTCHOICE

## 2018-12-11 RX ORDER — BISACODYL 5 MG
10 TABLET, DELAYED RELEASE (ENTERIC COATED) ORAL NIGHTLY
Refills: 0 | COMMUNITY
Start: 2018-12-11

## 2018-12-11 RX ORDER — DOCUSATE SODIUM 100 MG/1
100 CAPSULE, LIQUID FILLED ORAL 3 TIMES DAILY
Refills: 0 | COMMUNITY
Start: 2018-12-11 | End: 2022-10-25

## 2018-12-11 RX ORDER — OXYCODONE AND ACETAMINOPHEN 10; 325 MG/1; MG/1
.5-1 TABLET ORAL EVERY 4 HOURS PRN
Qty: 40 TABLET | Refills: 0 | Status: SHIPPED | OUTPATIENT
Start: 2018-12-11 | End: 2018-12-11 | Stop reason: HOSPADM

## 2018-12-11 RX ADMIN — DOCUSATE SODIUM 100 MG: 100 CAPSULE, LIQUID FILLED ORAL at 12:12

## 2018-12-11 RX ADMIN — KETOROLAC TROMETHAMINE 15 MG: 30 INJECTION, SOLUTION INTRAMUSCULAR; INTRAVENOUS at 06:12

## 2018-12-11 RX ADMIN — HEPARIN SODIUM 5000 UNITS: 5000 INJECTION, SOLUTION INTRAVENOUS; SUBCUTANEOUS at 05:12

## 2018-12-11 RX ADMIN — CEFAZOLIN 2 G: 330 INJECTION, POWDER, FOR SOLUTION INTRAMUSCULAR; INTRAVENOUS at 05:12

## 2018-12-11 RX ADMIN — DOCUSATE SODIUM 100 MG: 100 CAPSULE, LIQUID FILLED ORAL at 08:12

## 2018-12-11 RX ADMIN — OXYCODONE HYDROCHLORIDE AND ACETAMINOPHEN 1 TABLET: 5; 325 TABLET ORAL at 12:12

## 2018-12-11 RX ADMIN — KETOROLAC TROMETHAMINE 15 MG: 30 INJECTION, SOLUTION INTRAMUSCULAR; INTRAVENOUS at 11:12

## 2018-12-11 RX ADMIN — CEFAZOLIN 2 G: 330 INJECTION, POWDER, FOR SOLUTION INTRAMUSCULAR; INTRAVENOUS at 12:12

## 2018-12-11 RX ADMIN — OXYCODONE HYDROCHLORIDE AND ACETAMINOPHEN 1 TABLET: 5; 325 TABLET ORAL at 04:12

## 2018-12-11 RX ADMIN — DEXTROSE AND SODIUM CHLORIDE: 5; .45 INJECTION, SOLUTION INTRAVENOUS at 01:12

## 2018-12-11 RX ADMIN — DIPHENHYDRAMINE HYDROCHLORIDE 25 MG: 25 CAPSULE ORAL at 03:12

## 2018-12-11 RX ADMIN — DEXTROSE AND SODIUM CHLORIDE: 5; .45 INJECTION, SOLUTION INTRAVENOUS at 08:12

## 2018-12-11 RX ADMIN — OXYCODONE HYDROCHLORIDE AND ACETAMINOPHEN 1 TABLET: 5; 325 TABLET ORAL at 08:12

## 2018-12-11 NOTE — PROGRESS NOTES
Plan to discharge today.  RTC on 12/21/18 to see coordinator and surge brandi with follow up labs.  Reviewed issues to contact team regarding and provided after hours contact information.

## 2018-12-11 NOTE — NURSING TRANSFER
Nursing Transfer Note      12/10/2018     Transfer To: [id number]    Transfer via stretcher    Transfer with IV meds, chart    Transported by PCT    Medicines sent: N/A    Chart send with patient: Yes    Notified: family

## 2018-12-11 NOTE — PLAN OF CARE
Problem: Adult Inpatient Plan of Care  Goal: Plan of Care Review  Outcome: Ongoing (interventions implemented as appropriate)  POC reviewed with patient.  She ambulated well from stretcher to bed. Minimal complaints of pain.  No nausea or vomiting reported.  Hct monitored Q6H.  VS stable and patient is on 2L NC.  Incisions *4 with dermabond, c/d/i.  Patient tolerating PO.  Plan for the day is to remove shahid, ambulate patient, and titrate O2 off.  No acute distress noted, will continue to monitor.

## 2018-12-11 NOTE — PROGRESS NOTES
DONOR NEPHRECTOMY NOTE:    Thea FENG Major is s/p donor nephrectomy on 12/10/18.  This patients medications prior to surgery were reviewed and restarted, as appropriate.

## 2018-12-11 NOTE — PROGRESS NOTES
Admit Note     Met with patient to assess needs. Patient is a 58 y.o. single female, admitted for for living kidney donation.      Patient admitted from home on 12/10/2018 .  At this time, patient presents as alert and oriented x 4, pleasant, good eye contact, well groomed, recall good, concentration/judgement good, average intelligence, calm, communicative, cooperative and asking and answering questions appropriately.  At this time, patients caregiver was not present. Pt's sister was there this AM and stepped out for a minute.     Household/Family Systems     Patient resides with patient's friend, at [address] [facility] [place] [address].  Support system includes family and friends.  Patient does not have dependents that are need of being cared for.     Patients primary caregiver is while in hospital:Rosalinda Major [phone](pt's sister), Once home; pt's room mate Ender Arroyo [phone].  Confirmed patients contact information is [phone] (home);   Telephone Information:   Mobile [phone]   .    During admission, patient's caregiver plans to stay in patient's room.  Confirmed patient and patients caregivers do have access to reliable transportation.    Cognitive Status/Learning     Patient reports reading ability as 12th grade and states patient does not have difficulty with reading, writing, seeing, hearing, comprehension, learning and memory.  Patient reports patient learns best by multisensory.   Needed: No.   Highest education level: High School (9-12) or GED    Vocation/Disability   .  Working for Income: yes  If yes, working activity level: Working Full Time  Patient is employed as  at [employer].    Adherence     Patient reports a high level of adherence to patients health care regimen.  Adherence counseling and education provided. Patient verbalizes understanding.    Substance Use    Patient reports the following substance usage.    Tobacco: none, patient  denies any use.  Alcohol: none, patient denies any use.  Illicit Drugs/Non-prescribed Medications: none, patient denies any use.  Patient states clear understanding of the potential impact of substance use.  Substance abstinence/cessation counseling, education and resources provided and reviewed.     Services Utilizing/ADLS    Infusion Service: Prior to admission, patient utilizing? no  Home Health: Prior to admission, patient utilizing? no  DME: Prior to admission, no  Pulmonary/Cardiac Rehab: Prior to admission, no  Dialysis:  Prior to admission, no  Transplant Specialty Pharmacy:  Prior to admission, no.    Prior to admission, patient reports patient was independent with ADLS and was driving.  Patient reports patient is not able to care for self at this time due to compromised medical condition (as documented in medical record) and physical weakness..  Patient indicates a willingness to care for self once medically cleared to do so.    Insurance/Medications    Insured by   Payor/Plan Subscr  Sex Relation Sub. Ins. ID Effective Group Num   1.  - TRI* SANDRA DIGGS * 1960 Female  [id number] NO                                   [address], [facility] [address]   2. BLUE CROSS BL* SANDRA DIGGS * 1960 Female  [id number] 11/[birth year]/10 [id number]                                   [address] [address]      Primary Insurance (for UNOS reporting): Public Insurance - Other Government  Secondary Insurance (for UNOS reporting): Private Insurance    Patient reports patient is able to obtain and afford medications at this time and at time of discharge.    Living Will/Healthcare Power of     Patient states patient does not have a LW and/or HCPA.   provided education regarding LW and HCPA and the completion of forms.    Coping/Mental Health    Patient is coping adequately with the aid of  family members and friends.  Patient denies mental health difficulties.     Discharge Planning    At  time of discharge, patient plans to return to patient's home under the care of Ar Arroyo and Rosalinda Major.  Patients sister will transport patient.  Per rounds today, expected discharge date is today 12/11/18. Patient and patients caregiver  verbalize understanding and are involved in treatment planning and discharge process.    Additional Concerns     providing ongoing psychosocial support, education, resources and d/c planning as needed.  SW remains available.  remains available. Patient denies additional needs and/or concerns at this time. Patient verbalizes understanding and agreement with information reviewed, social work availability, and how to access available resources as needed.

## 2018-12-11 NOTE — ANESTHESIA POSTPROCEDURE EVALUATION
"Anesthesia Post Evaluation    Patient: Thea Major    Procedure(s) Performed: Procedure(s) (LRB):  ROBOTIC NEPHRECTOMY, DONOR (Left)    Final Anesthesia Type: general  Patient location during evaluation: PACU  Patient participation: Yes- Able to Participate  Level of consciousness: awake and alert and awake  Post-procedure vital signs: reviewed and stable  Pain management: adequate  Airway patency: patent  PONV status at discharge: No PONV  Anesthetic complications: no      Cardiovascular status: blood pressure returned to baseline  Respiratory status: unassisted and spontaneous ventilation  Hydration status: euvolemic  Follow-up not needed.        Visit Vitals  /79   Pulse 71   Temp 36.9 °C (98.5 °F)   Resp 16   Ht 5' 4" (1.626 m)   Wt 67.2 kg (148 lb 2.4 oz)   SpO2 97%   Breastfeeding? No   BMI 25.43 kg/m²       Pain/Benjamín Score: Pain Rating Prior to Med Admin: 8 (12/11/2018  8:37 AM)  Pain Rating Post Med Admin: 4 (12/11/2018  5:08 AM)  Benjamín Score: 10 (12/10/2018  6:30 PM)        "

## 2018-12-11 NOTE — DISCHARGE SUMMARY
[facility]  Kidney Transplant  Discharge Summary    Patient Name: Thea Major  MRN: [id number]  Admission Date: 12/10/2018  Hospital Length of Stay: 1 days  Discharge Date and Time:  12/11/2018 3:20 PM  Attending Physician: Samreen att. providers found   Discharging Provider: Anayeli Holloway PA-C  Primary Care Provider: Primary Doctor Samreen    Hospital Course: Ms. Thea Major is a 59 y/o F who was admitted for living related donor nephrectomy. She is now s/p a left robotic nephrectomy on 12/10/18. Surgery was without complication. Patient progressed well post operatively and was subsequently discharged on POD#1. On day of discharge, patient feeling well, tolerating diet, ambulating, abdominal pain well controlled, (-)flatus/BM. Lord catheter removed, urinating without difficulty. Midline incision and lap sites clean, dry, intact. She is stable and ready for discharge. She will follow up with clinic appointment via transplant coordinator. Patient verbalized her understanding prior to discharge.     Procedure(s) (LRB):  ROBOTIC NEPHRECTOMY, DONOR (Left)     No notes on file    Final Active Diagnoses:    Diagnosis Date Noted POA    PRINCIPAL PROBLEM:  Willing to be kidney donor [Z00.5] 06/27/2018 Not Applicable      Problems Resolved During this Admission:       Treatments: As above    Consults (From admission, onward)        Status Ordering Provider     Inpatient consult to Registered Dietitian/Nutritionist  Once     Provider:  (Not yet assigned)    REYNA Banegas          Pending Diagnostic Studies:     None        Significant Diagnostic Studies: Labs:   CMP   Recent Labs   Lab 12/11/18  0717   *   K 4.0      CO2 23   *   BUN 9   CREATININE 1.0   CALCIUM 8.0*   PROT 5.4*   ALBUMIN 2.9*   BILITOT 0.4   ALKPHOS 50*   AST 27   ALT 13   ANIONGAP 7*   ESTGFRAFRICA >60.0   EGFRNONAA >60.0    and CBC   Recent Labs   Lab 12/11/18  0717   WBC 7.85   HGB 11.0*   HCT 33.5*   33.2*          Discharged Condition: good    Disposition: Home or Self Care    Follow Up:    Patient Instructions:      Diet Adult Regular     Lifting restrictions   Order Comments: Do not lift greater than 10 lbs for 6 weeks from time of transplant     Notify your health care provider if you experience any of the following:  temperature >100.4     Notify your health care provider if you experience any of the following:  persistent nausea and vomiting or diarrhea     Notify your health care provider if you experience any of the following:  severe uncontrolled pain     Notify your health care provider if you experience any of the following:  redness, tenderness, or signs of infection (pain, swelling, redness, odor or green/yellow discharge around incision site)     Notify your health care provider if you experience any of the following:  difficulty breathing or increased cough     Notify your health care provider if you experience any of the following:  severe persistent headache     Notify your health care provider if you experience any of the following:  worsening rash     Notify your health care provider if you experience any of the following:  persistent dizziness, light-headedness, or visual disturbances     Notify your health care provider if you experience any of the following:  increased confusion or weakness     Notify your health care provider if you experience any of the following:   Order Comments: For any other concerning signs or symptoms     Weight bearing restrictions (specify):   Order Comments: Do not lift greater than 10 lbs for 6 weeks from time of transplant     Medications:  Reconciled Home Medications:      Medication List      START taking these medications    bisacodyl 5 mg EC tablet  Commonly known as:  DULCOLAX  Take 2 tablets (10 mg total) by mouth every evening.     docusate sodium 100 MG capsule  Commonly known as:  COLACE  Take 1 capsule (100 mg total) by mouth 3 (three) times  daily.     ondansetron 8 MG Tbdl  Commonly known as:  ZOFRAN-ODT  Dissolve 1 tablet (8 mg total) by mouth every 8 (eight) hours as needed (nausea).     oxyCODONE-acetaminophen 5-325 mg per tablet  Commonly known as:  PERCOCET  Take 1-2 tablets by mouth every 4 (four) hours as needed for Pain.        CHANGE how you take these medications    umeclidinium-vilanterol 62.5-25 mcg/actuation Dsdv  Commonly known as:  ANORO ELLIPTA  Inhale 1 puff into the lungs once daily.  What changed:  additional instructions        CONTINUE taking these medications    ALPRAZolam 0.5 MG tablet  Commonly known as:  XANAX  Take 0.25 mg by mouth nightly as needed for Anxiety.     amLODIPine 10 MG tablet  Commonly known as:  NORVASC  Take 10 mg by mouth every evening.     buPROPion 150 MG TB24 tablet  Commonly known as:  WELLBUTRIN XL  Take 150 mg by mouth every evening.     levocetirizine 5 MG tablet  Commonly known as:  XYZAL  Take 5 mg by mouth every evening.     montelukast 10 mg tablet  Commonly known as:  SINGULAIR  Take 10 mg by mouth every evening.          Time spent caring for patient (Greater than 1/2 spent in direct face-to-face contact): > 30 minutes    Anayeli Holloway PA-C  Kidney Transplant  [facility]

## 2018-12-11 NOTE — CONSULTS
"  [facility]  Adult Nutrition  Consult Note    SUMMARY     Recommendations    Recommendation/Intervention: 1. Continue regular diet as tolerated.  Goals: 1. Pt to consume >75% meals.  Nutrition Goal Status: new  Communication of RD Recs: (POC)    Reason for Assessment    Reason For Assessment: consult  Diagnosis: (kidney donor)  Relevant Medical History: HTN, anxiety  Interdisciplinary Rounds: attended  General Information Comments: Kidney donor is doing well, advanced to regular diet. Pt reports good po intake and appetite and appears well-nourished.  Nutrition Discharge Planning: Pt with adequate po intake to meet nutrition needs.    Nutrition Risk Screen    Nutrition Risk Screen: no indicators present    Nutrition/Diet History    Spiritual, Cultural Beliefs, Christianity Practices, Values that Affect Care: no    Anthropometrics    Temp: 98.9 °F (37.2 °C)  Height Method: Stated  Height: 5' 4" (162.6 cm)  Height (inches): 64 in  Weight Method: Bed Scale  Weight: 67.2 kg (148 lb 2.4 oz)  Weight (lb): 148.15 lb  Ideal Body Weight (IBW), Female: 120 lb  % Ideal Body Weight, Female (lb): 116.67 lb  BMI (Calculated): 24.1       Lab/Procedures/Meds    Pertinent Labs Reviewed: reviewed  Pertinent Labs Comments: Na 130, Glu 131, Alk phos 50, Alb 2.9  Pertinent Medications Reviewed: reviewed  Pertinent Medications Comments: colace       Estimated/Assessed Needs    Weight Used For Calorie Calculations: 67.2 kg (148 lb 2.4 oz)  Energy Calorie Requirements (kcal): 1559  Energy Need Method: Bellamy-St Jeor(PAL 1.25)  Protein Requirements: 74-88g  Weight Used For Protein Calculations: 67.2 kg (148 lb 2.4 oz)  RDA Method (mL): 1559         Nutrition Prescription Ordered         Evaluation of Received Nutrient/Fluid Intake       % Intake of Estimated Energy Needs: 75 - 100 %  % Meal Intake: 75 - 100 %    Nutrition Risk    Level of Risk/Frequency of Follow-up: low     Assessment and Plan    Nutrition " Problem  Increased nutrient needs    Related to (etiology):   Physiological causes related to wound healing    Signs and Symptoms (as evidenced by):   Pt s/p donating a kidney       Nutrition Diagnosis Status:   New    Monitor and Evaluation    Food and Nutrient Intake: energy intake, food and beverage intake  Food and Nutrient Adminstration: diet order  Anthropometric Measurements: weight change, body mass index  Biochemical Data, Medical Tests and Procedures: electrolyte and renal panel, gastrointestinal profile, glucose/endocrine profile, inflammatory profile, lipid profile  Nutrition-Focused Physical Findings: overall appearance     Malnutrition Assessment      Pt does not meet criteria for malnutrition at this time.    Nutrition Follow-Up    RD Follow-up?: Yes

## 2018-12-11 NOTE — PROGRESS NOTES
DONOR NEPHRECTOMY EDUCATION & DISCHARGE NOTE:    Discharge medications are to include pain control as Percocet and Colace/Dulcolax while taking pain medications to prevent constipation.  Patient was counseled at discharged regarding the side effects of pain medication, including drowsiness, constipation, nausea and counseled not to operate a car while taking pain medication or take Tylenol (acetaminophen) containing medications while taking pain medication.  Patient verbalized understanding.

## 2018-12-11 NOTE — TRANSFER OF CARE
"Anesthesia Transfer of Care Note    Patient: Thea Major    Procedure(s) Performed: Procedure(s) (LRB):  ROBOTIC NEPHRECTOMY, DONOR (Left)    Patient location: PACU    Anesthesia Type: general    Transport from OR: Transported from OR on 6-10 L/min O2 by face mask with adequate spontaneous ventilation    Post pain: adequate analgesia    Post assessment: no apparent anesthetic complications and tolerated procedure well    Post vital signs: stable    Level of consciousness: awake    Nausea/Vomiting: no vomiting    Complications: none    Transfer of care protocol was followed      Last vitals:   Visit Vitals  /79   Pulse 71   Temp 36.9 °C (98.5 °F)   Resp 16   Ht 5' 4" (1.626 m)   Wt 67.2 kg (148 lb 2.4 oz)   SpO2 97%   Breastfeeding? No   BMI 25.43 kg/m²     "

## 2018-12-11 NOTE — PROGRESS NOTES
Discharge Note/Education  Pt resting in chair with caregiver at the bedside.  Below discharge instructions reviewed with patient and caregiver.  All questions answered and patient verbalized understanding.     KIDNEY DONOR DISCHARGE INSTRUCTIONS    1. No heavy lifting (greater than 10-15 pounds) for six weeks.  2. Showers only, for the first two weeks after surgery.   You can take a tub bath after two weeks or when your incision is completely healed.     3. Clean the incision in the shower with a mild soap and water, rinse and dry.   4.   Call the outpatient kidney transplant coordinator Monday through Friday 8:00 a.m. - 4:30 p.m. at [phone] or [phone], ext. [phone] if calling long distance.  After hours, on weekends and holidays call [phone] or [phone] and you will be directed to [facility] RN On Call  [facility] for the following:    Signs and symptoms of wound infection  · Redness and/or swelling of the wound  · Drainage from the wound  · Temperature > 101.0 F   · Wound opening or separation     Signs and symptoms of urinary tract infection  · Frequency of urination or problems urinating  · Burning during urination  · Cloudy or bloody urine  · Foul smelling urine  · Temperature > 101.0  Any other medical problems in the immediate discharge period which are of concern to you.    4. You will need to see the transplant doctor approximately four weeks after discharge.  Blood and urine specimens will be obtained two hours prior to your appointment.  If you did not receive the appointments upon discharge you can schedule your appointments by calling [phone] or [phone], ext. [phone].   5. Discuss with the doctor at your clinic appointment when you can return to driving and work.  If all goes well, usually this is within 4 to 6 weeks.    6. Six months after donating your kidney, you will again need lab work and a checkup with your doctor. As well as on a yearly basis.  Our office will need  copies of these records for the first 2 years after donation.  Please have your doctor fax them to us at [phone].              *YOU SHOULD ALWAYS HAVE YEARLY MEDICAL CHECKUPS WITH YOUR LOCAL PHYSICIAN, INCLUDING BLOOD WORK TO EVALUATE YOUR KIDNEY FUNCTION.   *Avoid Advil. Motrin, Aleive and other Non-Steriodals, these can be toxic to your kidney.    *Tylenol is okay.              Dear Kidney Donor,    Thank you so much for your heroic gift.  One never really knows, if they will be called to be a hero and whether or not they will be able to. You have accomplished this in our eyes, as well as, your recipients.  This is truly an amazing gift that you have given.    At discharge, you will be given a follow up appointment for lab work and a surgical visit which will occur about 4 weeks after donation.  At this appointment you will discuss with the physician when you will be able to return to normal activities, including work, as well as any other concerns you may have.      In order to ensure your health after donation, as well the health of future donors, we will need to do some routine follow up.  This will entail a call from our transplant office at   6 months, 1 year and 2 years after donation.   We are required to send information to UN[employer] ([employer]) on your progress and health at these intervals.    We will inquire about the followin. Current weight  2. Diagnostic tests done since our last call (CT scan, MRI, Ultrasound)  3. Lab work results including a creatinine and urinalysis  4. Blood Pressure reading  5. Any new medical conditions such as kidney problems, diabetes or hypertension  6. Admission to the hospital, if so, for what reason     It is very important that after donation you establish with a Primary Care Physician to maintain your health.  We ask that you see your physician at 6 months after donation, and then yearly.  These visits should include a complete physical  exam, as well as blood work, including complete chemistries and urinalysis.  Please have your physician fax these lab results and clinic notes to us at [phone].    If there is anything we can help you with please call us at [phone].  We sincerely hope your donation experience was a pleasant one and wish you good health and well being.        Your Transplant Team at [facility]

## 2018-12-11 NOTE — PROGRESS NOTES
Patient admitted for Kidney transplant donor nephrectomy on 12/10/18.Transplant coordinator met with the patient on rounds to introduce self and explain the coordinator role.Transplant Coordinator explained that she will follow the patient while in the hospital and assist with discharge.

## 2018-12-11 NOTE — PROGRESS NOTES
D/C instructions reviewed with pt and pt's sisters, all verbalized understanding.  Pt picked up zofran and percocet rx from pharmacy.  Pain controlled and pt tolerated a regular diet.  Wheel chair requested.

## 2018-12-21 ENCOUNTER — OFFICE VISIT (OUTPATIENT)
Dept: TRANSPLANT | Facility: CLINIC | Age: 58
End: 2018-12-21
Payer: OTHER GOVERNMENT

## 2018-12-21 ENCOUNTER — CLINICAL SUPPORT (OUTPATIENT)
Dept: TRANSPLANT | Facility: CLINIC | Age: 58
End: 2018-12-21
Payer: OTHER GOVERNMENT

## 2018-12-21 ENCOUNTER — LAB VISIT (OUTPATIENT)
Dept: LAB | Facility: HOSPITAL | Age: 58
End: 2018-12-21
Payer: OTHER GOVERNMENT

## 2018-12-21 VITALS
DIASTOLIC BLOOD PRESSURE: 75 MMHG | BODY MASS INDEX: 23.83 KG/M2 | WEIGHT: 143.06 LBS | WEIGHT: 143.06 LBS | OXYGEN SATURATION: 96 % | TEMPERATURE: 98 F | DIASTOLIC BLOOD PRESSURE: 75 MMHG | TEMPERATURE: 98 F | HEIGHT: 65 IN | HEIGHT: 65 IN | RESPIRATION RATE: 17 BRPM | SYSTOLIC BLOOD PRESSURE: 133 MMHG | BODY MASS INDEX: 23.83 KG/M2 | SYSTOLIC BLOOD PRESSURE: 133 MMHG | OXYGEN SATURATION: 96 % | RESPIRATION RATE: 17 BRPM | HEART RATE: 69 BPM | HEART RATE: 69 BPM

## 2018-12-21 DIAGNOSIS — Z00.5 TRANSPLANT DONOR EVALUATION: ICD-10-CM

## 2018-12-21 DIAGNOSIS — Z52.4 DONOR, KIDNEY: Primary | ICD-10-CM

## 2018-12-21 LAB
ALBUMIN SERPL BCP-MCNC: 3.6 G/DL
ANION GAP SERPL CALC-SCNC: 8 MMOL/L
BASOPHILS # BLD AUTO: 0.04 K/UL
BASOPHILS NFR BLD: 0.7 %
BUN SERPL-MCNC: 24 MG/DL
CALCIUM SERPL-MCNC: 9.3 MG/DL
CHLORIDE SERPL-SCNC: 104 MMOL/L
CO2 SERPL-SCNC: 25 MMOL/L
CREAT SERPL-MCNC: 1.1 MG/DL
DIFFERENTIAL METHOD: ABNORMAL
EOSINOPHIL # BLD AUTO: 0.1 K/UL
EOSINOPHIL NFR BLD: 1.8 %
ERYTHROCYTE [DISTWIDTH] IN BLOOD BY AUTOMATED COUNT: 13 %
EST. GFR  (AFRICAN AMERICAN): >60 ML/MIN/1.73 M^2
EST. GFR  (NON AFRICAN AMERICAN): 56.3 ML/MIN/1.73 M^2
GLUCOSE SERPL-MCNC: 86 MG/DL
HCT VFR BLD AUTO: 40.7 %
HGB BLD-MCNC: 12.6 G/DL
IMM GRANULOCYTES # BLD AUTO: 0.02 K/UL
IMM GRANULOCYTES NFR BLD AUTO: 0.4 %
LYMPHOCYTES # BLD AUTO: 1.6 K/UL
LYMPHOCYTES NFR BLD: 28.4 %
MCH RBC QN AUTO: 29.4 PG
MCHC RBC AUTO-ENTMCNC: 31 G/DL
MCV RBC AUTO: 95 FL
MONOCYTES # BLD AUTO: 0.6 K/UL
MONOCYTES NFR BLD: 10.8 %
NEUTROPHILS # BLD AUTO: 3.2 K/UL
NEUTROPHILS NFR BLD: 57.9 %
NRBC BLD-RTO: 0 /100 WBC
PHOSPHATE SERPL-MCNC: 4.1 MG/DL
PLATELET # BLD AUTO: 374 K/UL
PMV BLD AUTO: 9.5 FL
POTASSIUM SERPL-SCNC: 4.6 MMOL/L
RBC # BLD AUTO: 4.28 M/UL
SODIUM SERPL-SCNC: 137 MMOL/L
WBC # BLD AUTO: 5.56 K/UL

## 2018-12-21 PROCEDURE — 99213 OFFICE O/P EST LOW 20 MIN: CPT | Mod: PBBFAC

## 2018-12-21 PROCEDURE — 99024 POSTOP FOLLOW-UP VISIT: CPT | Mod: ,,, | Performed by: SURGERY

## 2018-12-21 PROCEDURE — 99999 PR PBB SHADOW E&M-EST. PATIENT-LVL III: CPT | Mod: PBBFAC,,,

## 2018-12-21 PROCEDURE — 85025 COMPLETE CBC W/AUTO DIFF WBC: CPT

## 2018-12-21 PROCEDURE — 80069 RENAL FUNCTION PANEL: CPT

## 2018-12-21 PROCEDURE — 36415 COLL VENOUS BLD VENIPUNCTURE: CPT

## 2018-12-21 PROCEDURE — 99213 OFFICE O/P EST LOW 20 MIN: CPT | Mod: PBBFAC,27

## 2018-12-21 NOTE — PROGRESS NOTES
Patient is here for first post op visit. Patient reports pain is well controlled by pain medication.  Patient is having regular bowel movements.  Incision without any redness or drainage.  Reviewed activity and driving instructions with patient.  Patient verbalized understanding. Patient to follow up in 3 weeks.  Pt will contact me with date that works best for her.

## 2018-12-21 NOTE — PROGRESS NOTES
Transplant Surgery Kidney Donor Follow-up    Chief Complaint: Thea Major is a 58 y.o. year old female who  Underwent left robotic/laparoscopic donor nephrectomy on .  She presents for surgical follow-up.  Current symptoms include none.     Review of Systems   Constitutional: Negative.  Negative for activity change, appetite change, chills, diaphoresis, fatigue, fever and unexpected weight change.   Respiratory: Negative for cough, choking, chest tightness and shortness of breath.    Cardiovascular: Negative for chest pain.   Gastrointestinal: Negative for abdominal distention, constipation, diarrhea, nausea and vomiting.   Musculoskeletal: Negative for arthralgias.   Skin: Negative for color change and rash.   Neurological: Negative for dizziness and headaches.   Psychiatric/Behavioral: Negative for confusion.   All other systems reviewed and are negative.    Physical Exam   Constitutional: She is oriented to person, place, and time. She appears well-developed and well-nourished. No distress.   HENT:   Mouth/Throat: No oropharyngeal exudate.   Eyes: Conjunctivae are normal. Pupils are equal, round, and reactive to light. No scleral icterus.   Neck: Neck supple. No thyroid mass and no thyromegaly present.   Cardiovascular: Normal rate, normal heart sounds and intact distal pulses.   Pulmonary/Chest: Effort normal and breath sounds normal. No respiratory distress. She has no wheezes. She has no rales.   Abdominal: Soft. She exhibits no distension, no ascites and no mass. There is no hepatosplenomegaly. There is no tenderness. There is no rebound and no guarding. No hernia.   Musculoskeletal: She exhibits no edema.   Lymphadenopathy:        Head (right side): No submandibular adenopathy present.        Head (left side): No submandibular adenopathy present.     She has no cervical adenopathy.        Right: No inguinal and no supraclavicular adenopathy present.        Left: No inguinal and no supraclavicular  adenopathy present.   Neurological: She is alert and oriented to person, place, and time.   Skin: Skin is dry and intact. No rash noted. She is not diaphoretic.   Psychiatric: She has a normal mood and affect.   Nursing note and vitals reviewed.    Lab Results   Component Value Date    BUN 24 (H) 12/21/2018    CREATININE 1.1 12/21/2018     Lab Results   Component Value Date    WBC 7.85 12/11/2018    HGB 11.0 (L) 12/11/2018    HCT 33.5 (L) 12/11/2018    HCT 33.2 (L) 12/11/2018     12/11/2018       Assessment and Plan:  1. Donor, kidney        Thea is doing well following living kidney donation.  She  is advised to refrain from heavy lifting for a period of two to four weeks from the date of surgery and then gradually resume normal activities. She is discharged from transplant surgical follow-up at this time. She should have an annual physician visit with routine laboratory and hypertension screening.    I discussed the need for our program to be able to contact living donors for UNOS reporting purposes for a minimum of 2 years.  Failure of our center to be able to provide such information could jeopardize our ability to continue to offer living donor transplants.  Thea voices understanding and agrees to this follow-up.

## 2019-01-02 DIAGNOSIS — Z00.5 TRANSPLANT DONOR EVALUATION: Primary | ICD-10-CM

## 2019-01-04 ENCOUNTER — PATIENT MESSAGE (OUTPATIENT)
Dept: PULMONOLOGY | Facility: CLINIC | Age: 59
End: 2019-01-04

## 2019-01-11 ENCOUNTER — LAB VISIT (OUTPATIENT)
Dept: LAB | Facility: HOSPITAL | Age: 59
End: 2019-01-11
Attending: TRANSPLANT SURGERY
Payer: OTHER GOVERNMENT

## 2019-01-11 ENCOUNTER — OFFICE VISIT (OUTPATIENT)
Dept: TRANSPLANT | Facility: CLINIC | Age: 59
End: 2019-01-11
Payer: COMMERCIAL

## 2019-01-11 VITALS
HEART RATE: 72 BPM | WEIGHT: 144.19 LBS | OXYGEN SATURATION: 96 % | BODY MASS INDEX: 24.02 KG/M2 | DIASTOLIC BLOOD PRESSURE: 83 MMHG | TEMPERATURE: 98 F | RESPIRATION RATE: 17 BRPM | SYSTOLIC BLOOD PRESSURE: 133 MMHG | HEIGHT: 65 IN

## 2019-01-11 DIAGNOSIS — Z52.4 DONOR, KIDNEY: Primary | ICD-10-CM

## 2019-01-11 DIAGNOSIS — Z00.5 TRANSPLANT DONOR EVALUATION: ICD-10-CM

## 2019-01-11 LAB
ALBUMIN SERPL BCP-MCNC: 3.8 G/DL
ANION GAP SERPL CALC-SCNC: 9 MMOL/L
BASOPHILS # BLD AUTO: 0.02 K/UL
BASOPHILS NFR BLD: 0.3 %
BUN SERPL-MCNC: 17 MG/DL
CALCIUM SERPL-MCNC: 10 MG/DL
CHLORIDE SERPL-SCNC: 104 MMOL/L
CO2 SERPL-SCNC: 25 MMOL/L
CREAT SERPL-MCNC: 1 MG/DL
DIFFERENTIAL METHOD: NORMAL
EOSINOPHIL # BLD AUTO: 0.2 K/UL
EOSINOPHIL NFR BLD: 2.6 %
ERYTHROCYTE [DISTWIDTH] IN BLOOD BY AUTOMATED COUNT: 13 %
EST. GFR  (AFRICAN AMERICAN): >60 ML/MIN/1.73 M^2
EST. GFR  (NON AFRICAN AMERICAN): >60 ML/MIN/1.73 M^2
GLUCOSE SERPL-MCNC: 95 MG/DL
HCT VFR BLD AUTO: 40.2 %
HGB BLD-MCNC: 12.9 G/DL
IMM GRANULOCYTES # BLD AUTO: 0.02 K/UL
IMM GRANULOCYTES NFR BLD AUTO: 0.3 %
LYMPHOCYTES # BLD AUTO: 1.5 K/UL
LYMPHOCYTES NFR BLD: 22.8 %
MCH RBC QN AUTO: 29.9 PG
MCHC RBC AUTO-ENTMCNC: 32.1 G/DL
MCV RBC AUTO: 93 FL
MONOCYTES # BLD AUTO: 0.5 K/UL
MONOCYTES NFR BLD: 7.9 %
NEUTROPHILS # BLD AUTO: 4.4 K/UL
NEUTROPHILS NFR BLD: 66.1 %
NRBC BLD-RTO: 0 /100 WBC
PHOSPHATE SERPL-MCNC: 3.4 MG/DL
PLATELET # BLD AUTO: 264 K/UL
PMV BLD AUTO: 9.6 FL
POTASSIUM SERPL-SCNC: 3.9 MMOL/L
RBC # BLD AUTO: 4.32 M/UL
SODIUM SERPL-SCNC: 138 MMOL/L
WBC # BLD AUTO: 6.58 K/UL

## 2019-01-11 PROCEDURE — 85025 COMPLETE CBC W/AUTO DIFF WBC: CPT

## 2019-01-11 PROCEDURE — 36415 COLL VENOUS BLD VENIPUNCTURE: CPT

## 2019-01-11 PROCEDURE — 99999 PR PBB SHADOW E&M-EST. PATIENT-LVL III: ICD-10-PCS | Mod: PBBFAC,,,

## 2019-01-11 PROCEDURE — 99024 POSTOP FOLLOW-UP VISIT: CPT | Mod: S$GLB,,, | Performed by: TRANSPLANT SURGERY

## 2019-01-11 PROCEDURE — 99999 PR PBB SHADOW E&M-EST. PATIENT-LVL III: CPT | Mod: PBBFAC,,,

## 2019-01-11 PROCEDURE — 99024 PR POST-OP FOLLOW-UP VISIT: ICD-10-PCS | Mod: S$GLB,,, | Performed by: TRANSPLANT SURGERY

## 2019-01-11 PROCEDURE — 80069 RENAL FUNCTION PANEL: CPT

## 2019-01-11 NOTE — PROGRESS NOTES
Transplant Surgery Kidney Donor Follow-up    Chief Complaint: Thea Major is a 58 y.o. year old female who  Underwent left robotic/laparoscopic donor nephrectomy on .  She presents for surgical follow-up.  Current symptoms include none.     Review of Systems   Constitutional: Negative for activity change, appetite change, chills and fever.   HENT: Negative for congestion, nosebleeds, sinus pressure, sore throat and voice change.    Eyes: Negative for pain and visual disturbance.   Respiratory: Negative for cough and shortness of breath.    Cardiovascular: Negative for palpitations and leg swelling.   Gastrointestinal: Negative for abdominal distention, abdominal pain, diarrhea, nausea and vomiting.   Endocrine: Negative for cold intolerance and heat intolerance.   Genitourinary: Negative for dysuria, flank pain, frequency and hematuria.   Musculoskeletal: Negative for back pain and gait problem.   Skin: Negative for color change, pallor and wound.   Allergic/Immunologic: Negative for food allergies.   Neurological: Negative for dizziness, seizures, numbness and headaches.   Hematological: Negative for adenopathy.   Psychiatric/Behavioral: Negative for agitation, behavioral problems, hallucinations and sleep disturbance.     Physical Exam   Constitutional: She is oriented to person, place, and time. She appears well-developed and well-nourished.   HENT:   Head: Normocephalic and atraumatic.   Eyes: EOM are normal. Pupils are equal, round, and reactive to light.   Cardiovascular: Normal rate and regular rhythm.   Pulmonary/Chest: Effort normal.   Abdominal: Soft. She exhibits no distension. There is no tenderness. There is no guarding.   Incision c/d/i   Musculoskeletal: Normal range of motion.   Neurological: She is alert and oriented to person, place, and time.   Skin: Skin is warm and dry.   Psychiatric: She has a normal mood and affect. Her behavior is normal.     Lab Results   Component Value Date    BUN  17 01/11/2019    CREATININE 1.0 01/11/2019     Lab Results   Component Value Date    WBC 6.58 01/11/2019    HGB 12.9 01/11/2019    HCT 40.2 01/11/2019     01/11/2019       Assessment and Plan:  1. Donor, kidney        Thea is doing well following living kidney donation.  She  is advised to refrain from heavy lifting for a period of two to four weeks from the date of surgery and then gradually resume normal activities. She is discharged from transplant surgical follow-up at this time. She should have an annual physician visit with routine laboratory and hypertension screening.    I discussed the need for our program to be able to contact living donors for UNOS reporting purposes for a minimum of 2 years.  Failure of our center to be able to provide such information could jeopardize our ability to continue to offer living donor transplants.  Thea voices understanding and agrees to this follow-up.

## 2019-03-21 ENCOUNTER — HISTORICAL (OUTPATIENT)
Dept: ADMINISTRATIVE | Facility: HOSPITAL | Age: 59
End: 2019-03-21

## 2019-03-21 LAB
ABS NEUT (OLG): 3.1 X10(3)/MCL (ref 2.1–9.2)
ALBUMIN SERPL-MCNC: 4.5 GM/DL (ref 3.4–5)
ALBUMIN/GLOB SERPL: 1.8 {RATIO} (ref 1.5–2.5)
ALP SERPL-CCNC: 61 UNIT/L (ref 38–126)
ALT SERPL-CCNC: 13 UNIT/L (ref 7–52)
APPEARANCE, UA: CLEAR
AST SERPL-CCNC: 18 UNIT/L (ref 15–37)
BACTERIA #/AREA URNS AUTO: NORMAL /HPF
BILIRUB SERPL-MCNC: 0.5 MG/DL (ref 0.2–1)
BILIRUB UR QL STRIP: NEGATIVE MG/DL
BILIRUBIN DIRECT+TOT PNL SERPL-MCNC: 0.1 MG/DL (ref 0–0.5)
BILIRUBIN DIRECT+TOT PNL SERPL-MCNC: 0.4 MG/DL
BUN SERPL-MCNC: 17 MG/DL (ref 7–18)
CALCIUM SERPL-MCNC: 9 MG/DL (ref 8.5–10)
CHLORIDE SERPL-SCNC: 107 MMOL/L (ref 98–107)
CHOLEST SERPL-MCNC: 221 MG/DL (ref 0–200)
CHOLEST/HDLC SERPL: 2.8 {RATIO}
CO2 SERPL-SCNC: 27 MMOL/L (ref 21–32)
COLOR UR: NORMAL
CREAT SERPL-MCNC: 1.1 MG/DL (ref 0.6–1.3)
ERYTHROCYTE [DISTWIDTH] IN BLOOD BY AUTOMATED COUNT: 12.9 % (ref 11.5–17)
GLOBULIN SER-MCNC: 2.5 GM/DL (ref 1.2–3)
GLUCOSE (UA): NEGATIVE MG/DL
GLUCOSE SERPL-MCNC: 94 MG/DL (ref 74–106)
HCT VFR BLD AUTO: 42.2 % (ref 37–47)
HDLC SERPL-MCNC: 80 MG/DL (ref 35–60)
HGB BLD-MCNC: 14 GM/DL (ref 12–16)
HGB UR QL STRIP: NEGATIVE UNIT/L
KETONES UR QL STRIP: NEGATIVE MG/DL
LDLC SERPL CALC-MCNC: 104 MG/DL (ref 0–129)
LEUKOCYTE ESTERASE UR QL STRIP: NEGATIVE UNIT/L
LYMPHOCYTES # BLD AUTO: 1.6 X10(3)/MCL (ref 0.6–3.4)
LYMPHOCYTES NFR BLD AUTO: 31 % (ref 13–40)
MCH RBC QN AUTO: 30.5 PG (ref 27–31.2)
MCHC RBC AUTO-ENTMCNC: 33 GM/DL (ref 32–36)
MCV RBC AUTO: 92 FL (ref 80–94)
MONOCYTES # BLD AUTO: 0.4 X10(3)/MCL (ref 0.1–1.3)
MONOCYTES NFR BLD AUTO: 8 % (ref 0.1–24)
NEUTROPHILS NFR BLD AUTO: 61 % (ref 47–80)
NITRITE UR QL STRIP.AUTO: NEGATIVE
PH UR STRIP: 7 [PH]
PLATELET # BLD AUTO: 278 X10(3)/MCL (ref 130–400)
PMV BLD AUTO: 8.4 FL (ref 9.4–12.4)
POTASSIUM SERPL-SCNC: 4.4 MMOL/L (ref 3.5–5.1)
PROT SERPL-MCNC: 7 GM/DL (ref 6.4–8.2)
PROT UR QL STRIP: NEGATIVE MG/DL
RBC # BLD AUTO: 4.59 X10(6)/MCL (ref 4.2–5.4)
RBC #/AREA URNS HPF: NORMAL /HPF
SODIUM SERPL-SCNC: 141 MMOL/L (ref 136–145)
SP GR UR STRIP: 1.01
SQUAMOUS EPITHELIAL, UA: NORMAL /LPF
TRIGL SERPL-MCNC: 81 MG/DL (ref 30–150)
TSH SERPL-ACNC: 1.06 MIU/ML (ref 0.35–4.94)
UROBILINOGEN UR STRIP-ACNC: 0.2 MG/DL
VLDLC SERPL CALC-MCNC: 16.2 MG/DL
WBC # SPEC AUTO: 5.1 X10(3)/MCL (ref 4.5–11.5)
WBC #/AREA URNS AUTO: NORMAL /[HPF]

## 2019-05-28 ENCOUNTER — TELEPHONE (OUTPATIENT)
Dept: TRANSPLANT | Facility: CLINIC | Age: 59
End: 2019-05-28

## 2019-05-28 NOTE — TELEPHONE ENCOUNTER
Patient called stating that she went to the doctor due to causing some damage to a pectoral muscle.  Unsure of the degree of damage as she will need an MRI.  Pt wanted clarification of whether she can take the pain medication and muscle relaxant prescribed.  Pt will call back with the actual names of medication as she did not have it on her at the time.  All questions answered and verbalized understanding.

## 2019-06-19 ENCOUNTER — TELEPHONE (OUTPATIENT)
Dept: TRANSPLANT | Facility: CLINIC | Age: 59
End: 2019-06-19

## 2019-06-19 NOTE — TELEPHONE ENCOUNTER
TheaMatilde Major    6/19/2019    [id number]    Description: female 1960    Attempt at contact: 1st    Follow-up Period:6 month    Physical Capacity: No limitations    Working for income: Yes. Working full-time    Loss of medical insurance due to donation: No    Current weight: 142 lbs.     Date of weight measurement: 6/19/19    Any ER visits since last contact: No    Any hospitalizations since last contact: No    Any medical issues since last contact: No    Dialysis required: No    Diabetes: No    Any kidney complications: No    Notes: Patient reports doing well without any issues.  Lab orders emailed to patient.

## 2019-06-24 DIAGNOSIS — J44.9 CHRONIC OBSTRUCTIVE PULMONARY DISEASE, UNSPECIFIED COPD TYPE: Primary | ICD-10-CM

## 2019-06-24 DIAGNOSIS — J44.9 CHRONIC OBSTRUCTIVE PULMONARY DISEASE, UNSPECIFIED COPD TYPE: ICD-10-CM

## 2019-06-24 RX ORDER — UMECLIDINIUM BROMIDE AND VILANTEROL TRIFENATATE 62.5; 25 UG/1; UG/1
POWDER RESPIRATORY (INHALATION)
Qty: 60 EACH | Refills: 1 | Status: SHIPPED | OUTPATIENT
Start: 2019-06-24 | End: 2019-06-24 | Stop reason: SDUPTHER

## 2019-06-24 NOTE — TELEPHONE ENCOUNTER
Spoke with pt she stated that she has a pulmonologist in [place] and will no longer be needing care here at [facility].

## 2019-07-03 ENCOUNTER — HISTORICAL (OUTPATIENT)
Dept: LAB | Facility: HOSPITAL | Age: 59
End: 2019-07-03

## 2019-07-03 ENCOUNTER — HISTORICAL (OUTPATIENT)
Dept: ADMINISTRATIVE | Facility: HOSPITAL | Age: 59
End: 2019-07-03

## 2019-07-03 LAB
BUN SERPL-MCNC: 19 MG/DL (ref 7–18)
CALCIUM SERPL-MCNC: 8.8 MG/DL (ref 8.5–10)
CHLORIDE SERPL-SCNC: 105 MMOL/L (ref 98–107)
CO2 SERPL-SCNC: 28 MMOL/L (ref 21–32)
CREAT SERPL-MCNC: 1.49 MG/DL (ref 0.6–1.3)
CREAT UR-MCNC: 77 MG/DL
CREAT/UREA NIT SERPL: 12.8
GLUCOSE SERPL-MCNC: 96 MG/DL (ref 74–106)
POTASSIUM SERPL-SCNC: 4.5 MMOL/L (ref 3.5–5.1)
PROT UR STRIP-MCNC: 8.9 MG/DL
PROT/CREAT UR-RTO: 0.1 MG/DL
SODIUM SERPL-SCNC: 139 MMOL/L (ref 136–145)

## 2019-07-08 ENCOUNTER — TELEPHONE (OUTPATIENT)
Dept: TRANSPLANT | Facility: CLINIC | Age: 59
End: 2019-07-08

## 2019-07-08 NOTE — TELEPHONE ENCOUNTER
Spoke with patient regarding lab results. Creatinine higher than previously. Encouraged to increase hydration to at least 2 liters of water daily. Patient will follow up with her PCP.   Patient routinely checks blood pressure at home and states last reading was 117/80.   Patient encouraged to contact me with any questions or concerns.

## 2019-07-26 ENCOUNTER — TELEPHONE (OUTPATIENT)
Dept: TRANSPLANT | Facility: CLINIC | Age: 59
End: 2019-07-26

## 2019-07-26 NOTE — TELEPHONE ENCOUNTER
Email received:    Good afternoon!    I need a little guidance. It seems that I may be getting a UTI. It started yesterday and has continues into today. I have a sensation right before and right after I urinate and I don't feel like I am emptying my bladder. Also, I seem to be going more frequently. There is no odor, pain or cloudiness. Not sure what I should do. Do you think I should try to get to the doctor or can you send me antibiotics? Let me know what you think please.    I actually sent this to Betty this morning, but it got kicked back, that is why I am sending to both of you.    Thanks and have a great day!    Thea Major    Patient instructed to contact her local physician or urgent care for urine testing and antibiotics if indicated.

## 2019-08-29 ENCOUNTER — HISTORICAL (OUTPATIENT)
Dept: LAB | Facility: HOSPITAL | Age: 59
End: 2019-08-29

## 2019-08-29 ENCOUNTER — HISTORICAL (OUTPATIENT)
Dept: ADMINISTRATIVE | Facility: HOSPITAL | Age: 59
End: 2019-08-29

## 2019-08-29 LAB
APPEARANCE, UA: CLEAR
BACTERIA #/AREA URNS AUTO: NORMAL /HPF
BILIRUB UR QL STRIP: NEGATIVE MG/DL
COLOR UR: NORMAL
GLUCOSE (UA): NEGATIVE MG/DL
HGB UR QL STRIP: NEGATIVE UNIT/L
KETONES UR QL STRIP: NEGATIVE MG/DL
LEUKOCYTE ESTERASE UR QL STRIP: NEGATIVE UNIT/L
NITRITE UR QL STRIP.AUTO: NEGATIVE
PH UR STRIP: 5.5 [PH]
PROT UR QL STRIP: NEGATIVE MG/DL
RBC #/AREA URNS HPF: NORMAL /HPF
SP GR UR STRIP: <1.005
SQUAMOUS EPITHELIAL, UA: NORMAL /LPF
UROBILINOGEN UR STRIP-ACNC: 0.2 MG/DL
WBC #/AREA URNS AUTO: NORMAL /[HPF]

## 2019-09-06 ENCOUNTER — HISTORICAL (OUTPATIENT)
Dept: LAB | Facility: HOSPITAL | Age: 59
End: 2019-09-06

## 2019-09-06 ENCOUNTER — HISTORICAL (OUTPATIENT)
Dept: ADMINISTRATIVE | Facility: HOSPITAL | Age: 59
End: 2019-09-06

## 2019-09-06 LAB
APPEARANCE, UA: CLEAR
BACTERIA #/AREA URNS AUTO: NORMAL /HPF
BILIRUB UR QL STRIP: NEGATIVE MG/DL
COLOR UR: YELLOW
GLUCOSE (UA): NEGATIVE MG/DL
HGB UR QL STRIP: NEGATIVE UNIT/L
KETONES UR QL STRIP: NEGATIVE MG/DL
LEUKOCYTE ESTERASE UR QL STRIP: NEGATIVE UNIT/L
NITRITE UR QL STRIP.AUTO: NEGATIVE
PH UR STRIP: 5.5 [PH]
PROT UR QL STRIP: NEGATIVE MG/DL
RBC #/AREA URNS HPF: NORMAL /HPF
SP GR UR STRIP: <1.005
SQUAMOUS EPITHELIAL, UA: NORMAL /LPF
UROBILINOGEN UR STRIP-ACNC: 0.2 MG/DL
WBC #/AREA URNS AUTO: NORMAL /[HPF]

## 2019-09-08 LAB — FINAL CULTURE: NO GROWTH

## 2019-10-16 ENCOUNTER — HISTORICAL (OUTPATIENT)
Dept: ADMINISTRATIVE | Facility: HOSPITAL | Age: 59
End: 2019-10-16

## 2019-10-16 LAB
APPEARANCE, UA: CLEAR
BACTERIA #/AREA URNS AUTO: NORMAL /HPF
BILIRUB UR QL STRIP: NEGATIVE MG/DL
BUN SERPL-MCNC: 18 MG/DL (ref 7–18)
CALCIUM SERPL-MCNC: 9.2 MG/DL (ref 8.5–10)
CHLORIDE SERPL-SCNC: 104 MMOL/L (ref 98–107)
CO2 SERPL-SCNC: 27 MMOL/L (ref 21–32)
COLOR UR: YELLOW
CREAT SERPL-MCNC: 1.11 MG/DL (ref 0.6–1.3)
CREAT/UREA NIT SERPL: 16.2
GLUCOSE (UA): NEGATIVE MG/DL
GLUCOSE SERPL-MCNC: 90 MG/DL (ref 74–106)
HGB UR QL STRIP: NEGATIVE UNIT/L
KETONES UR QL STRIP: NEGATIVE MG/DL
LEUKOCYTE ESTERASE UR QL STRIP: NEGATIVE UNIT/L
NITRITE UR QL STRIP.AUTO: NEGATIVE
PH UR STRIP: 5.5 [PH]
POTASSIUM SERPL-SCNC: 4.4 MMOL/L (ref 3.5–5.1)
PROT UR QL STRIP: NEGATIVE MG/DL
RBC #/AREA URNS HPF: NORMAL /HPF
SODIUM SERPL-SCNC: 138 MMOL/L (ref 136–145)
SP GR UR STRIP: <1.005
SQUAMOUS EPITHELIAL, UA: NORMAL /LPF
UROBILINOGEN UR STRIP-ACNC: 0.2 MG/DL
WBC #/AREA URNS AUTO: NORMAL /[HPF]

## 2019-10-18 ENCOUNTER — TELEPHONE (OUTPATIENT)
Dept: TRANSPLANT | Facility: CLINIC | Age: 59
End: 2019-10-18

## 2019-10-28 ENCOUNTER — TELEPHONE (OUTPATIENT)
Dept: TRANSPLANT | Facility: CLINIC | Age: 59
End: 2019-10-28

## 2019-10-28 NOTE — TELEPHONE ENCOUNTER
----- Message from Belgica Fulton sent at 10/28/2019  4:04 PM CDT -----  Contact: Pt      ----- Message -----  From: Son Devi  Sent: 10/28/2019   3:59 PM CDT  To: [facility] Kidney Living Donor Coordinator    Pt need to set up annual appt.    Pt# [phone]

## 2019-10-28 NOTE — TELEPHONE ENCOUNTER
"Thea Major    10/28/2019    [id number]    Description: female 1960    Attempt at contact: 1st    Follow-up Period:1 year    Physical Capacity: No limitations    Working for income: Yes. Working full-time    Loss of medical insurance due to donation: no    Current weight: 65.8 kg    Date of weight measurement: 10/16/19    Any ER visits since last contact: no    Any hospitalizations since last contact: No    Any medical issues since last contact: No    Dialysis required: No    Diabetes: No    Any kidney complications: No    Notes: pt states that she feels "like donation never happened" no issues at all. Feels "great"       "

## 2020-02-04 ENCOUNTER — HISTORICAL (OUTPATIENT)
Dept: ADMINISTRATIVE | Facility: HOSPITAL | Age: 60
End: 2020-02-04

## 2020-02-04 ENCOUNTER — HISTORICAL (OUTPATIENT)
Dept: LAB | Facility: HOSPITAL | Age: 60
End: 2020-02-04

## 2020-02-04 LAB
ABS NEUT (OLG): 2.7 X10(3)/MCL (ref 2.1–9.2)
ALBUMIN SERPL-MCNC: 4.6 GM/DL (ref 3.4–5)
ALBUMIN/GLOB SERPL: 1.92 {RATIO} (ref 1.5–2.5)
ALP SERPL-CCNC: 59 UNIT/L (ref 38–126)
ALT SERPL-CCNC: 16 UNIT/L (ref 7–52)
AMYLASE SERPL-CCNC: 51 UNIT/L (ref 25–115)
APPEARANCE, UA: CLEAR
AST SERPL-CCNC: 21 UNIT/L (ref 15–37)
BACTERIA #/AREA URNS AUTO: NORMAL /HPF
BILIRUB SERPL-MCNC: 0.6 MG/DL (ref 0.2–1)
BILIRUB UR QL STRIP: NEGATIVE MG/DL
BILIRUBIN DIRECT+TOT PNL SERPL-MCNC: 0.1 MG/DL (ref 0–0.5)
BILIRUBIN DIRECT+TOT PNL SERPL-MCNC: 0.5 MG/DL
BUN SERPL-MCNC: 13 MG/DL (ref 7–18)
CALCIUM SERPL-MCNC: 9.2 MG/DL (ref 8.5–10)
CHLORIDE SERPL-SCNC: 103 MMOL/L (ref 98–107)
CO2 SERPL-SCNC: 29 MMOL/L (ref 21–32)
COLOR UR: YELLOW
CREAT SERPL-MCNC: 1.23 MG/DL (ref 0.6–1.3)
ERYTHROCYTE [DISTWIDTH] IN BLOOD BY AUTOMATED COUNT: 13 % (ref 11.5–17)
GLOBULIN SER-MCNC: 2.4 GM/DL (ref 1.2–3)
GLUCOSE (UA): NEGATIVE MG/DL
GLUCOSE SERPL-MCNC: 113 MG/DL (ref 74–106)
H PYLORI AB SER IA-ACNC: NEGATIVE
HCT VFR BLD AUTO: 43.8 % (ref 37–47)
HGB BLD-MCNC: 14.3 GM/DL (ref 12–16)
HGB UR QL STRIP: NEGATIVE UNIT/L
KETONES UR QL STRIP: NEGATIVE MG/DL
LEUKOCYTE ESTERASE UR QL STRIP: NEGATIVE UNIT/L
LIPASE SERPL-CCNC: 185 UNIT/L (ref 73–393)
LYMPHOCYTES # BLD AUTO: 1.3 X10(3)/MCL (ref 0.6–3.4)
LYMPHOCYTES NFR BLD AUTO: 28.8 % (ref 13–40)
MCH RBC QN AUTO: 30.3 PG (ref 27–31.2)
MCHC RBC AUTO-ENTMCNC: 33 GM/DL (ref 32–36)
MCV RBC AUTO: 93 FL (ref 80–94)
MONOCYTES # BLD AUTO: 0.4 X10(3)/MCL (ref 0.1–1.3)
MONOCYTES NFR BLD AUTO: 9.6 % (ref 0.1–24)
NEUTROPHILS NFR BLD AUTO: 61.6 % (ref 47–80)
NITRITE UR QL STRIP.AUTO: NEGATIVE
PH UR STRIP: 7.5 [PH]
PLATELET # BLD AUTO: 286 X10(3)/MCL (ref 130–400)
PMV BLD AUTO: 8.4 FL (ref 9.4–12.4)
POTASSIUM SERPL-SCNC: 4.6 MMOL/L (ref 3.5–5.1)
PROT SERPL-MCNC: 7 GM/DL (ref 6.4–8.2)
PROT UR QL STRIP: NEGATIVE MG/DL
RBC # BLD AUTO: 4.72 X10(6)/MCL (ref 4.2–5.4)
RBC #/AREA URNS HPF: NORMAL /HPF
SODIUM SERPL-SCNC: 140 MMOL/L (ref 136–145)
SP GR UR STRIP: 1.01
SQUAMOUS EPITHELIAL, UA: NORMAL /LPF
UROBILINOGEN UR STRIP-ACNC: 0.2 MG/DL
WBC # SPEC AUTO: 4.4 X10(3)/MCL (ref 4.5–11.5)
WBC #/AREA URNS AUTO: NORMAL /[HPF]

## 2020-04-16 ENCOUNTER — HISTORICAL (OUTPATIENT)
Dept: ADMINISTRATIVE | Facility: HOSPITAL | Age: 60
End: 2020-04-16

## 2020-04-16 LAB
ABS NEUT (OLG): 3.2 X10(3)/MCL (ref 2.1–9.2)
ALBUMIN SERPL-MCNC: 4.5 GM/DL (ref 3.4–5)
ALBUMIN/GLOB SERPL: 1.67 {RATIO} (ref 1.5–2.5)
ALP SERPL-CCNC: 57 UNIT/L (ref 38–126)
ALT SERPL-CCNC: 12 UNIT/L (ref 7–52)
APPEARANCE, UA: CLEAR
AST SERPL-CCNC: 19 UNIT/L (ref 15–37)
BACTERIA #/AREA URNS AUTO: NORMAL /HPF
BILIRUB SERPL-MCNC: 0.6 MG/DL (ref 0.2–1)
BILIRUB UR QL STRIP: NEGATIVE MG/DL
BILIRUBIN DIRECT+TOT PNL SERPL-MCNC: 0.1 MG/DL (ref 0–0.5)
BILIRUBIN DIRECT+TOT PNL SERPL-MCNC: 0.5 MG/DL
BUN SERPL-MCNC: 24 MG/DL (ref 7–18)
CALCIUM SERPL-MCNC: 8.9 MG/DL (ref 8.5–10)
CHLORIDE SERPL-SCNC: 101 MMOL/L (ref 98–107)
CHOLEST SERPL-MCNC: 245 MG/DL (ref 0–200)
CHOLEST/HDLC SERPL: 2.6 {RATIO}
CO2 SERPL-SCNC: 25 MMOL/L (ref 21–32)
COLOR UR: YELLOW
CREAT SERPL-MCNC: 1.12 MG/DL (ref 0.6–1.3)
ERYTHROCYTE [DISTWIDTH] IN BLOOD BY AUTOMATED COUNT: 13.2 % (ref 11.5–17)
GLOBULIN SER-MCNC: 2.7 GM/DL (ref 1.2–3)
GLUCOSE (UA): NEGATIVE MG/DL
GLUCOSE SERPL-MCNC: 86 MG/DL (ref 74–106)
HCT VFR BLD AUTO: 45.1 % (ref 37–47)
HDLC SERPL-MCNC: 95 MG/DL (ref 35–60)
HGB BLD-MCNC: 14.6 GM/DL (ref 12–16)
HGB UR QL STRIP: NEGATIVE UNIT/L
KETONES UR QL STRIP: NEGATIVE MG/DL
LDLC SERPL CALC-MCNC: 121 MG/DL (ref 0–129)
LEUKOCYTE ESTERASE UR QL STRIP: NEGATIVE UNIT/L
LYMPHOCYTES # BLD AUTO: 1.6 X10(3)/MCL (ref 0.6–3.4)
LYMPHOCYTES NFR BLD AUTO: 29.4 % (ref 13–40)
MCH RBC QN AUTO: 29.9 PG (ref 27–31.2)
MCHC RBC AUTO-ENTMCNC: 32 GM/DL (ref 32–36)
MCV RBC AUTO: 92 FL (ref 80–94)
MONOCYTES # BLD AUTO: 0.6 X10(3)/MCL (ref 0.1–1.3)
MONOCYTES NFR BLD AUTO: 10.9 % (ref 0.1–24)
NEUTROPHILS NFR BLD AUTO: 59.7 % (ref 47–80)
NITRITE UR QL STRIP.AUTO: NEGATIVE
PH UR STRIP: 5.5 [PH]
PLATELET # BLD AUTO: 269 X10(3)/MCL (ref 130–400)
PMV BLD AUTO: 8.7 FL (ref 9.4–12.4)
POTASSIUM SERPL-SCNC: 4.4 MMOL/L (ref 3.5–5.1)
PROT SERPL-MCNC: 7.2 GM/DL (ref 6.4–8.2)
PROT UR QL STRIP: NEGATIVE MG/DL
RBC # BLD AUTO: 4.88 X10(6)/MCL (ref 4.2–5.4)
RBC #/AREA URNS HPF: NORMAL /HPF
SODIUM SERPL-SCNC: 136 MMOL/L (ref 136–145)
SP GR UR STRIP: <1.005
SQUAMOUS EPITHELIAL, UA: NORMAL /LPF
TRIGL SERPL-MCNC: 76 MG/DL (ref 30–150)
TSH SERPL-ACNC: 1.26 MIU/ML (ref 0.35–4.94)
UROBILINOGEN UR STRIP-ACNC: 0.2 MG/DL
VLDLC SERPL CALC-MCNC: 15.2 MG/DL
WBC # SPEC AUTO: 5.4 X10(3)/MCL (ref 4.5–11.5)
WBC #/AREA URNS AUTO: NORMAL /[HPF]

## 2020-10-21 ENCOUNTER — HISTORICAL (OUTPATIENT)
Dept: ADMINISTRATIVE | Facility: HOSPITAL | Age: 60
End: 2020-10-21

## 2020-10-21 ENCOUNTER — TELEPHONE (OUTPATIENT)
Dept: TRANSPLANT | Facility: CLINIC | Age: 60
End: 2020-10-21

## 2020-10-21 LAB
APPEARANCE, UA: CLEAR
BACTERIA #/AREA URNS AUTO: ABNORMAL /HPF
BILIRUB UR QL STRIP: NEGATIVE MG/DL
BUN SERPL-MCNC: 23 MG/DL (ref 7–18)
CALCIUM SERPL-MCNC: 9 MG/DL (ref 8.5–10)
CHLORIDE SERPL-SCNC: 104 MMOL/L (ref 98–107)
CO2 SERPL-SCNC: 29 MMOL/L (ref 21–32)
COLOR UR: YELLOW
CREAT SERPL-MCNC: 1.02 MG/DL (ref 0.6–1.3)
CREAT/UREA NIT SERPL: 22.5
GLUCOSE (UA): NEGATIVE MG/DL
GLUCOSE SERPL-MCNC: 86 MG/DL (ref 74–106)
HGB UR QL STRIP: ABNORMAL UNIT/L
KETONES UR QL STRIP: NEGATIVE MG/DL
LEUKOCYTE ESTERASE UR QL STRIP: NEGATIVE UNIT/L
NITRITE UR QL STRIP.AUTO: NEGATIVE
PH UR STRIP: 5.5 [PH]
POTASSIUM SERPL-SCNC: 4.6 MMOL/L (ref 3.5–5.1)
PROT UR QL STRIP: NEGATIVE MG/DL
RBC #/AREA URNS HPF: ABNORMAL /HPF
SODIUM SERPL-SCNC: 138 MMOL/L (ref 136–145)
SP GR UR STRIP: <1.005
SQUAMOUS EPITHELIAL, UA: ABNORMAL /LPF
UROBILINOGEN UR STRIP-ACNC: 0.2 MG/DL
WBC #/AREA URNS AUTO: ABNORMAL /[HPF]

## 2020-10-21 NOTE — TELEPHONE ENCOUNTER
Thea DUNCANMili Major    10/21/2020    [id number]    Description: female 1960    Attempt at contact: 1st    Follow-up Period:2 year    Physical Capacity: No limitations    Working for income: Yes. Working full-time    Loss of medical insurance due to donation: No    Current weight: 152 lbs.     Date of weight measurement: 10/19/2020    Any ER visits since last contact: No    Any hospitalizations since last contact: No    Any medical issues since last contact: No    Dialysis required: No    Diabetes: No    Any kidney complications: No    Notes: saw her PCP for yearly update 10/21/2020. /82. On Amlodipine for BP control, not new. Outside lab results faxed for our records. No issues related to donation noted.

## 2020-10-21 NOTE — TELEPHONE ENCOUNTER
Spoke with pt Confirmed that she had her 2 year living donor labs locally               ----- Message from Tracy Frank RN sent at 10/19/2020 12:02 PM CDT -----  Contact: pt    ----- Message -----  From: Herlinda Valadez  Sent: 10/19/2020  11:43 AM CDT  To: [facility] Kidney Living Donor Coordinator    Pt calling to speak with coordinator regarding blood work       Call back : [phone]

## 2021-04-22 ENCOUNTER — HISTORICAL (OUTPATIENT)
Dept: ADMINISTRATIVE | Facility: HOSPITAL | Age: 61
End: 2021-04-22

## 2021-04-22 LAB
ABS NEUT (OLG): 2.6 X10(3)/MCL (ref 2.1–9.2)
ALBUMIN SERPL-MCNC: 4.4 GM/DL (ref 3.4–5)
ALBUMIN/GLOB SERPL: 2 {RATIO} (ref 1.5–2.5)
ALP SERPL-CCNC: 68 UNIT/L (ref 38–126)
ALT SERPL-CCNC: 17 UNIT/L (ref 7–52)
APPEARANCE, UA: CLEAR
AST SERPL-CCNC: 21 UNIT/L (ref 15–37)
BACTERIA #/AREA URNS AUTO: NORMAL /HPF
BILIRUB SERPL-MCNC: 0.5 MG/DL (ref 0.2–1)
BILIRUB UR QL STRIP: NEGATIVE MG/DL
BILIRUBIN DIRECT+TOT PNL SERPL-MCNC: 0.1 MG/DL (ref 0–0.5)
BILIRUBIN DIRECT+TOT PNL SERPL-MCNC: 0.4 MG/DL
BUN SERPL-MCNC: 17 MG/DL (ref 7–18)
CALCIUM SERPL-MCNC: 9.1 MG/DL (ref 8.5–10)
CHLORIDE SERPL-SCNC: 103 MMOL/L (ref 98–107)
CHOLEST SERPL-MCNC: 223 MG/DL (ref 0–200)
CHOLEST/HDLC SERPL: 2.3 {RATIO}
CO2 SERPL-SCNC: 26 MMOL/L (ref 21–32)
COLOR UR: YELLOW
CREAT SERPL-MCNC: 0.97 MG/DL (ref 0.6–1.3)
ERYTHROCYTE [DISTWIDTH] IN BLOOD BY AUTOMATED COUNT: 13.3 % (ref 11.5–17)
GLOBULIN SER-MCNC: 2.2 GM/DL (ref 1.2–3)
GLUCOSE (UA): NEGATIVE MG/DL
GLUCOSE SERPL-MCNC: 94 MG/DL (ref 74–106)
HCT VFR BLD AUTO: 42.9 % (ref 37–47)
HDLC SERPL-MCNC: 97 MG/DL (ref 35–60)
HGB BLD-MCNC: 14.1 GM/DL (ref 12–16)
HGB UR QL STRIP: NEGATIVE UNIT/L
KETONES UR QL STRIP: NEGATIVE MG/DL
LDLC SERPL CALC-MCNC: 109 MG/DL (ref 0–129)
LEUKOCYTE ESTERASE UR QL STRIP: NEGATIVE UNIT/L
LYMPHOCYTES # BLD AUTO: 1.4 X10(3)/MCL (ref 0.6–3.4)
LYMPHOCYTES NFR BLD AUTO: 29.7 % (ref 13–40)
MCH RBC QN AUTO: 29.8 PG (ref 27–31.2)
MCHC RBC AUTO-ENTMCNC: 33 GM/DL (ref 32–36)
MCV RBC AUTO: 91 FL (ref 80–94)
MONOCYTES # BLD AUTO: 0.7 X10(3)/MCL (ref 0.1–1.3)
MONOCYTES NFR BLD AUTO: 15.3 % (ref 0.1–24)
NEUTROPHILS NFR BLD AUTO: 55 % (ref 47–80)
NITRITE UR QL STRIP.AUTO: NEGATIVE
PH UR STRIP: 6 [PH]
PLATELET # BLD AUTO: 266 X10(3)/MCL (ref 130–400)
PMV BLD AUTO: 8.4 FL (ref 9.4–12.4)
POTASSIUM SERPL-SCNC: 4.1 MMOL/L (ref 3.5–5.1)
PROT SERPL-MCNC: 6.6 GM/DL (ref 6.4–8.2)
PROT UR QL STRIP: NEGATIVE MG/DL
RBC # BLD AUTO: 4.73 X10(6)/MCL (ref 4.2–5.4)
RBC #/AREA URNS HPF: NORMAL /HPF
SODIUM SERPL-SCNC: 140 MMOL/L (ref 136–145)
SP GR UR STRIP: 1.01
SQUAMOUS EPITHELIAL, UA: NORMAL /LPF
TRIGL SERPL-MCNC: 59 MG/DL (ref 30–150)
TSH SERPL-ACNC: 1.05 MIU/ML (ref 0.35–4.94)
UROBILINOGEN UR STRIP-ACNC: 0.2 MG/DL
VLDLC SERPL CALC-MCNC: 11.8 MG/DL
WBC # SPEC AUTO: 4.7 X10(3)/MCL (ref 4.5–11.5)
WBC #/AREA URNS AUTO: NORMAL /[HPF]

## 2021-09-09 ENCOUNTER — HISTORICAL (OUTPATIENT)
Dept: ADMINISTRATIVE | Facility: HOSPITAL | Age: 61
End: 2021-09-09

## 2021-09-09 LAB
ABS NEUT (OLG): 3.1 X10(3)/MCL (ref 2.1–9.2)
ALBUMIN SERPL-MCNC: 4.6 GM/DL (ref 3.4–5)
ALBUMIN/GLOB SERPL: 2 {RATIO} (ref 1.5–2.5)
ALP SERPL-CCNC: 68 UNIT/L (ref 38–126)
ALT SERPL-CCNC: 20 UNIT/L (ref 7–52)
AST SERPL-CCNC: 23 UNIT/L (ref 15–37)
BILIRUB SERPL-MCNC: 0.5 MG/DL (ref 0.2–1)
BILIRUBIN DIRECT+TOT PNL SERPL-MCNC: 0.1 MG/DL (ref 0–0.5)
BILIRUBIN DIRECT+TOT PNL SERPL-MCNC: 0.4 MG/DL
BUN SERPL-MCNC: 15 MG/DL (ref 7–18)
CALCIUM SERPL-MCNC: 9.5 MG/DL (ref 8.5–10)
CHLORIDE SERPL-SCNC: 101 MMOL/L (ref 98–107)
CO2 SERPL-SCNC: 32 MMOL/L (ref 21–32)
CREAT SERPL-MCNC: 0.96 MG/DL (ref 0.6–1.3)
ERYTHROCYTE [DISTWIDTH] IN BLOOD BY AUTOMATED COUNT: 14.1 % (ref 11.5–17)
GLOBULIN SER-MCNC: 2.3 GM/DL (ref 1.2–3)
GLUCOSE SERPL-MCNC: 88 MG/DL (ref 74–106)
HCT VFR BLD AUTO: 41.4 % (ref 37–47)
HGB BLD-MCNC: 13.9 GM/DL (ref 12–16)
LYMPHOCYTES # BLD AUTO: 1.4 X10(3)/MCL (ref 0.6–3.4)
LYMPHOCYTES NFR BLD AUTO: 31.4 % (ref 13–40)
MCH RBC QN AUTO: 30.9 PG (ref 27–31.2)
MCHC RBC AUTO-ENTMCNC: 34 GM/DL (ref 32–36)
MCV RBC AUTO: 92 FL (ref 80–94)
MONOCYTES # BLD AUTO: 0.1 X10(3)/MCL (ref 0.1–1.3)
MONOCYTES NFR BLD AUTO: 2.8 % (ref 0.1–24)
NEUTROPHILS NFR BLD AUTO: 65.8 % (ref 47–80)
PLATELET # BLD AUTO: 318 X10(3)/MCL (ref 130–400)
PMV BLD AUTO: 8.9 FL (ref 9.4–12.4)
POTASSIUM SERPL-SCNC: 4.7 MMOL/L (ref 3.5–5.1)
PROT SERPL-MCNC: 6.9 GM/DL (ref 6.4–8.2)
RBC # BLD AUTO: 4.5 X10(6)/MCL (ref 4.2–5.4)
SODIUM SERPL-SCNC: 137 MMOL/L (ref 136–145)
TSH SERPL-ACNC: 1.23 MIU/ML (ref 0.35–4.94)
WBC # SPEC AUTO: 4.6 X10(3)/MCL (ref 4.5–11.5)

## 2022-04-10 ENCOUNTER — HISTORICAL (OUTPATIENT)
Dept: ADMINISTRATIVE | Facility: HOSPITAL | Age: 62
End: 2022-04-10
Payer: COMMERCIAL

## 2022-04-25 ENCOUNTER — HISTORICAL (OUTPATIENT)
Dept: ADMINISTRATIVE | Facility: HOSPITAL | Age: 62
End: 2022-04-25
Payer: COMMERCIAL

## 2022-04-25 VITALS
DIASTOLIC BLOOD PRESSURE: 72 MMHG | WEIGHT: 157.44 LBS | OXYGEN SATURATION: 97 % | BODY MASS INDEX: 26.23 KG/M2 | SYSTOLIC BLOOD PRESSURE: 132 MMHG | HEIGHT: 65 IN

## 2022-05-03 NOTE — HISTORICAL [facility] CERNER
This is a historical note converted from Sonali. Formatting and pictures may have been removed.  Please reference Sonali for original formatting and attached multimedia. Chief Complaint  WELLNESS  History of Present Illness  The patient is a [57?? ] year old white female here today for a complete Wellness physical.? The patient?has?no acute complaints today. The patient also carries a diagnosis of?hypertension,?anxiety,?and a history of tobacco abuse for which she has quit in February 2016., which is assessed today.? Exercise is reported as daily and includes?rounds boxing.?? Diet modifications are reported as?low carb/low fat/low volume.?? Previous documented weight is recorded? as 153.2?pounds.  ?   She reports her anxiety?as controlled on medication.  Review of Systems  Constitutional:?no weight gain,?no weight loss,?no fatigue,?no fever,?no chills,?no weakness,?no trouble sleeping.  Eyes:?no vision loss/changes,?no glasses or contacts,?no pain,?no redness,?no blurry or double vision,?no flashing lights,?no specks,?no glaucoma,?no cataracts.  Last eye exam:?within last 6 months  Head:?no headache,?no head injury,?no neck pain.?  Neck:??no lumps,?no swollen glands,?no stiffness.  Ears:?no decreased hearing,?no ringing,?no earache,?no drainage.?  Nose:?no stuffiness,?no discharge,?no itching,?no hay fever,?no nosebleeds,?no sinus pain.  Throat:?no bleeding,?no dentures,?no sore tongue,?no dry mouth,?no sore throat,?no hoarseness,?no thrush,?no non-healing sores.  Cardiovascular:?no chest pain or discomfort,?no tightness,?no palpitations,?no SOB with activity,?no difficulty breathing while supine,?no swelling,?no sudden awakening from sleep with SOB.  Vascular:?no calf pain with walking,?no leg cramping.  Respiratory:??no cough,?no sputum,?no coughing up blood,?no SOB,?no wheezing,?no painful breathing.  Gastrointestinal:?no swallowing difficulty,?no heartburn,?no change in appetite,?no nausea,?no change in bowel  habits,?no rectal bleeding,?no constipation,?no diarrhea,?no yellow eyes or skin.  Urinary:?no frequency,?no urgency,?no burning or pain,?no blood in urine,?no incontinence,?no change in urinary strength.  Musculoskeletal:?no muscle or joint pain,?no stiffness,?no back pain,?no redness of joints,?no swelling of joints,?no trauma.  Skin:?no rashes,?no lumps,?no itching,?no dryness,?color normal for ethnicity,?no hair or nail changes.  Neurologic:?no dizziness,?no fainting,?no seizures,?no weakness,?no numbness,?no tingling,?no tremors.  Psychiatric:?no nervousness,?no stress,?no depression,?no memory loss.  Endocrine:?no heat or cold intolerance,?no sweating,?no frequent urination,?no thirst,?no change in appetite.  Hematologic:?no ease of bruising,?no ease of bleeding.  ?  ?  ?  Physical Exam  Vitals & Measurements  BP:?126/74?  HT:?165.1?cm? HT:?165.1?cm? WT:?64.7?kg? WT:?64.7?kg? BMI:?23.74?  VITAL SIGNS:? Reviewed.?Within normal limits.  GENERAL:?In no apparent distress.? Alert and Oriented x3  HEAD:?No signs of head trauma.Normocephalic  EYES:?Pupils equal/round/reactive to light.? Extraocular motionsintact.?  EARS:? Hearing grosslyintact. TMs and EACclear  MOUTH:? Oropharynx isclear.No erythema.No exudatesNo cobblestoningNo ulcerations.  NECK:?No Lymphadenopathy.No Jugulovenous distension.?No thyromegaly.No bruits  CHEST:? Chest withclear breath sounds bilaterally.?No wheezes,No rales,No rhonchi.?Good air movement  CARDIAC:?Regular rate and rhythm.?S1 and S2,without murmurs,gallops, orrubs.  VASCULAR:?No Edema.?Peripheral pulses normal and equal in all extremities.  ABDOMEN:?Soft.Nontender.?No sign of distention.?No reboundorguarding, andno massespalpated.? ?Bowel Sounds present and normal x 4.  Urologic:No CVA tenderness.No suprapubic tenderness.  MUSCULOSKELETAL:?Good range of motion of all major joints.?5/5 strength throughout.?Extremities without clubbing,cyanosisoredema.?  NEUROLOGIC EXAM:??No focal  sensory or strength deficits.?Speech normal.?Follows commands.  PSYCHIATRIC:? Moodnormal.?Linear.Lucid.Normal affect.No response to internal stimuli.MMSE essentially wnl.  SKIN:?No rashorlesions.  Assessment/Plan  1.?Wellness examination  ?Assessment/Plan:  ?   1.?Wellness  -Health and Exercise Prescription issued and educated upon  -10% weight loss goal  -Lifestyle counseling >20minutes  -Diet:?Low carb/low volume  -Screening:?Mammogram scheduled today/colonoscopy is up-to-date/Pap smear?next year in 2019  -Vaccines:?Up-to-date  -Labs:?CBC/CMP/fasting lipid panel/TSH/urinalysis/hepatitis?C antibody  ?   2.Comorbidities?as below  ?   3. Referrals?none  ?   4. RTC?6 months for hypertension anxiety  ?  Ordered:  CBC w/ Auto Diff, Routine collect, 03/12/18 9:12:00 CDT, Blood, Order for future visit, Stop date 03/12/18 9:12:00 CDT, Lab Collect, Wellness examination, 03/12/18 9:12:00 CDT  CMP, Routine collect, 03/12/18 9:12:00 CDT, Blood, Order for future visit, Stop date 03/12/18 9:12:00 CDT, Lab Collect, Wellness examination, 03/12/18 9:12:00 CDT  Hepatitis C Antibody, Routine collect, 03/12/18 9:12:00 CDT, Blood, Order for future visit, Stop date 03/12/18 9:12:00 CDT, Lab Collect, Wellness examination, 03/12/18 9:12:00 CDT  Lab Collection Request, 03/12/18 9:12:00 CDT, [facility], 03/12/18 9:12:00 CDT  Lipid Panel, Routine collect, *Est. 03/12/18 3:00:00 CDT, Blood, Order for future visit, *Est. Stop date 03/12/18 3:00:00 CDT, Lab Collect, Wellness examination, 03/12/18 9:12:00 CDT  MG Screening, Routine, 03/12/18 9:12:00 CDT, Screening, screening, None, Ambulatory, [place] surgical speciality requested by patient, Rad Type, Order for future visit, Wellness examination, Schedule this test, Outside facility, 03/12/18 9:12:00 CDT  Preventative Health Care Est 40-64 years [id number] PC, Wellness examination, [facility], 03/12/18 9:12:00 CDT  Schedule Diagnostics Study, mammogram, next practical [facility]  speciality, 03/12/18 9:12:00 CDT  Thyroid Stimulating Hormone, Routine collect, 03/12/18 9:12:00 CDT, Blood, Order for future visit, Stop date 03/12/18 9:12:00 CDT, Lab Collect, Wellness examination, 03/12/18 9:12:00 CDT  Urinalysis Complete no reflex, Routine collect, Urine, Order for future visit, 03/12/18 9:12:00 CDT, Stop date 03/12/18 9:12:00 CDT, Nurse collect, Wellness examination  ?  2.?Allergy  ?-Controlled with medications/continue Xyzal and Singulair  ?  3.?Anxiety  ?-Controlled with medication/continue Wellbutrin?and as needed Xanax use.? Relaxation techniques discussed at length  Ordered:  alPRAzolam, 1/2 tab, Oral, Daily, PRN PRN for anxiety, # 15 tab(s), 5 Refill(s), [facility]  Clinic Follow up, *Est. 09/12/18 3:00:00 CDT, Order for future visit, Hypertension  Anxiety, [facility]  ?  4.?Hypertension  ?  Essential Hypertension  ?   1. ?Advocate 100% compliance?with medication regimen?and?low-sodium diet  2. ?Advocate?increased?cardiovascular exercise as tolerated and educated upon  3.? 10% weight loss goal  4.? Monitor blood pressure?daily?with?an antecubital?digital?cuff  5. ?Follow-up in 6 months for?[hypertension/anxiety]  6. ?Future Lab:?6 month office visit  ?  ?  ?  ?  Ordered:  Clinic Follow up, *Est. 09/12/18 3:00:00 CDT, Order for future visit, Hypertension  Anxiety, [facility]  ?  Orders:  amLODIPine, 10 mg = 1 tab(s), Oral, Daily, # 90 tab(s), 1 Refill(s), [facility]  buPROPion, 150 mg = 1 tab(s), Oral, Daily, # 90 tab(s), 3 Refill(s), [facility]  montelukast, 10 mg = 1 tab(s), Oral, Daily, # 90 tab(s), 3 Refill(s), [facility]: [facility]   Problem List/Past Medical History  Ongoing  Allergy  Anxiety  Hypertension  Tobacco abuse  Historical  No qualifying data  Procedure/Surgical History  Biopsy of breast (1982), breast benign lump removed.  Medications  aspirin 81 mg oral Delayed  Release (EC) tablet, 81 mg= 1 tab(s), Oral, Daily  buPROPion 150 mg/12 hours (SR) oral tablet, extended release, 150 mg= 1 tab(s), Oral, Daily, 3 refills  Norvasc 10 mg oral tablet, 10 mg= 1 tab(s), Oral, Daily, 1 refills  Singulair 10 mg oral TABLET, 10 mg= 1 tab(s), Oral, Daily, 3 refills  Xanax 0.5 mg oral tablet, 1/2 tab, Oral, Daily, PRN, 5 refills  Xyzal 5 mg oral tablet, 5 mg= 1 tab(s), Oral, qPM  Zyrtec,? ?Not taking  Allergies  No Known Allergies  Social History  Alcohol  Beer, 1-2 times per month, 02/05/2018  Substance Abuse  Never, 02/05/2018  Tobacco  Former smoker, 10 per day. Previous treatment: Medications., 02/05/2018  Family History  Aneurysm: Mother.  Primary malignant neoplasm of lung: Father.  Immunizations  Vaccine Date Status   tetanus/diphth/pertuss (Tdap) adult/adol 12/12/2016 Recorded

## 2022-05-03 NOTE — HISTORICAL [facility] CERNER
This is a historical note converted from Sonali. Formatting and pictures may have been removed.  Please reference Sonali for original formatting and attached multimedia. Chief Complaint  6 MTH HTN OV  History of Present Illness  The patient is a 59yo white female.? ?Here in clinic for evaluation of hypertension.? The patient reports home blood pressures of 130s over 80s.? The patient reports 100% compliance with medication.? The patient reports no side effects with medication use.? The patient reports following a low-sodium diet.? The patient reports some cardiovascular exercise implementation.? There is no chest pain or shortness of breath reported with exercise.  Patient also reports that her?anxiety is controlled on as needed?alprazolam. ?No side effects.? She is also continue bupropion 150 mg?extended release daily.? She reports that her allergies are stable currently?on Xyzal and Singulair  Review of Systems  Constitutional_no fever chills,?no unintentional weight loss  Eye_  ENMT_  Respiratory_no shortness of breath or cough  Cardiovascular_no chest pain?or shortness of breath  Gastrointestinal_  Genitourinary_  Hema/Lymph_  Endocrine_  Immunologic_  Musculoskeletal_  Integumentary_  Neurologic_  All Other ROS_negative  Physical Exam  Vitals & Measurements  BP:?134/82?  HT:?165.1?cm? HT:?165.1?cm? WT:?65.8?kg? WT:?64.8?kg? BMI:?23.77?  VITAL SIGNS:? Reviewed.? ?  GENERAL:? In?no apparent distress.? Alert and Oriented x3  CHEST:? Chest with clear breath sounds bilaterally.??No wheezes, rales, or rhonchi. Good air movement  CARDIAC:??Regular rate and rhythm.? S1 and S2,?without murmurs, gallops, or rubs.  ABDOMINAL: Normal active BS X all 4 quadrants. Nontender. Nondistended.  NEUROLOGIC EXAM:? Alert and oriented x 3.? No focal sensory or strength deficits.? ?Speech normal.? Follows commands.  MUSCULOSKELATAL: Full range of motion.? 5 out of 5 strength throughout.  SKIN: No rash. ?No lesion.  PSYCHIATRIC:? Mood  normal.  ?  ?  Assessment/Plan  1.?Hypertension  ?  Essential Hypertension: At goal per JNC 8 guidelines/no change to medication regimen/six-month prescription written-see below  ?   1. ?Advocate 100% compliance?with medication regimen?and?low-sodium diet  2. ?Advocate?increased?cardiovascular exercise as tolerated and educated upon  3.? 10% weight loss goal  4.? Monitor blood pressure?daily?with?an antecubital?digital?cuff  5. ?Follow-up in 6 months for?wellness visit  6. ?Future Lab:?HTN labs today and wellness labs in 6 months  ?  ?  ?  ?  Ordered:  amLODIPine, 10 mg = 1 tab(s), Oral, Daily, # 90 tab(s), 1 Refill(s), [facility]: [facility]  Office/Outpatient Visit Level 3 Established [id number] PC, Hypertension  Anxiety, [facility], 09/12/18 9:57:00 CDT  ?  2.?Anxiety  ?-prescribed alprazolam one half tablet by mouth?daily as needed #15 and 5 refills  -Long discussion regarding?proper use and potential for dependency/side effects-patient voiced understanding  -Relaxation techniques discussed at length.  Ordered:  alPRAzolam, See Instructions, TAKE 1/2 TABLET BY MOUTH ONCE DAILY AS NEEDED FOR ANXIETY, # 15 tab(s), 5 Refill(s), [facility]: [facility]  Office/Outpatient Visit Level 3 Established [id number] , Hypertension  Anxiety, [facility], 09/12/18 9:57:00 CDT  ?  3.?Need for influenza vaccination  ?-RD flu vaccine today  ?  Orders:  Clinic Follow up, *Est. 03/15/19 8:15:00 CDT, Order for future visit, Wellness examination, [facility]   Problem List/Past Medical History  Ongoing  Allergy  Anxiety  COPD - Chronic obstructive pulmonary disease  Hypertension  Tobacco abuse  Historical  No qualifying data  Procedure/Surgical History  Biopsy of breast (1982)  breast benign lump removed   Medications  alPRAzolam 0.5 mg oral tablet, See Instructions, 5 refills  ANORO ELLIPTA 62.5-25 MCG INH  aspirin 81 mg oral Delayed Release (EC) tablet, 81 mg= 1 tab(s), Oral, Daily  buPROPion 150  mg/12 hours (SR) oral tablet, extended release, 150 mg= 1 tab(s), Oral, Daily, 3 refills  Norvasc 10 mg oral tablet, 10 mg= 1 tab(s), Oral, Daily, 1 refills  Singulair 10 mg oral TABLET, 10 mg= 1 tab(s), Oral, Daily, 3 refills  Xyzal 5 mg oral tablet, 5 mg= 1 tab(s), Oral, qPM  Allergies  No Known Allergies  Social History  Alcohol  Beer, 1-2 times per month, 02/05/2018  Substance Abuse  Never, 02/05/2018  Tobacco  Former smoker, 10 per day. Previous treatment: Medications., 02/05/2018  Family History  Aneurysm: Mother.  Primary malignant neoplasm of lung: Father.  Immunizations  Vaccine Date Status   influenza virus vaccine, inactivated 09/12/2018 Given   tetanus/diphth/pertuss (Tdap) adult/adol 12/12/2016 Recorded   Health Maintenance  Health Maintenance  ???Pending?(in the next year)  ??? ??OverDue  ??? ? ? ?COPD Management-Oxygen Assessment due??01/31/16??and every 1??year(s)  ??? ??Due?  ??? ? ? ?ADL Screening due??09/12/18??and every 1??year(s)  ??? ? ? ?Alcohol Misuse Screening due??09/12/18??and every 1??year(s)  ??? ? ? ?COPD Maintenance-Pulmonary Rehab Education due??09/12/18??and every 1??year(s)  ??? ? ? ?COPD Maintenance-Spirometry due??09/12/18??and every?  ??? ? ? ?COPD Management-COPD Medications Prescribed due??09/12/18??and every 1??year(s)  ??? ? ? ?Colorectal Screening due??09/12/18??and every?  ??? ? ? ?Depression Screening due??09/12/18??and every?  ??? ? ? ?Diabetes Screening due??09/12/18??and every?  ??? ? ? ?Hypertension Management-Education due??09/12/18??and every 1??year(s)  ??? ? ? ?Influenza Vaccine due??09/12/18??and every?  ??? ? ? ?Smoking Cessation due??09/12/18??Variable frequency  ??? ??Due In Future?  ??? ? ? ?Cervical Cancer Screening not due until??02/22/19??and every 3??year(s)  ??? ? ? ?Aspirin Therapy for CVD Prevention not due until??03/12/19??and every 1??year(s)  ???Satisfied?(in the past 1 year)  ??? ??Satisfied?  ??? ? ? ?Aspirin Therapy for CVD Prevention  on??03/12/18.  ??? ? ? ?Blood Pressure Screening on??09/12/18.??Satisfied by Liudmila Ramos  ??? ? ? ?Body Mass Index Check on??09/12/18.??Satisfied by Liudmila Ramos  ??? ? ? ?Breast Cancer Screening on??03/22/18.??Satisfied by Fish Courtney MD  ??? ? ? ?Diabetes Screening on??09/12/18.??Satisfied by Luis Vasquez  ??? ? ? ?Hypertension Management-BMP on??09/12/18.??Satisfied by Luis Vasquez  ??? ? ? ?Hypertension Management-Blood Pressure on??09/12/18.??Satisfied by Liudmila Ramos  ??? ? ? ?Influenza Vaccine on??09/12/18.??Satisfied by Fish Courtney MD  ??? ? ? ?Lipid Screening on??03/12/18.??Satisfied by Jenn Choi  ??? ? ? ?Obesity Screening on??09/12/18.??Satisfied by Liudmila Ramos  ?  ?

## 2022-09-18 ENCOUNTER — HISTORICAL (OUTPATIENT)
Dept: ADMINISTRATIVE | Facility: HOSPITAL | Age: 62
End: 2022-09-18
Payer: COMMERCIAL

## 2022-10-25 PROBLEM — K21.9 GASTROESOPHAGEAL REFLUX DISEASE: Status: ACTIVE | Noted: 2022-10-25

## 2022-10-25 PROBLEM — G25.81 RESTLESS LEGS SYNDROME: Status: ACTIVE | Noted: 2022-10-25

## 2022-10-25 PROBLEM — R91.8 LUNG NODULE, MULTIPLE: Status: ACTIVE | Noted: 2022-02-07

## 2022-10-25 PROBLEM — Z90.5 H/O LEFT NEPHRECTOMY: Status: ACTIVE | Noted: 2022-10-25

## 2022-10-25 PROBLEM — K64.9 HEMORRHOIDS: Status: ACTIVE | Noted: 2022-10-25

## 2022-10-25 PROBLEM — E04.1 THYROID NODULE: Status: ACTIVE | Noted: 2022-10-25

## 2022-10-25 PROBLEM — J30.9 ALLERGIC RHINITIS: Status: ACTIVE | Noted: 2022-10-25

## 2022-10-25 PROBLEM — Z78.0 MENOPAUSE: Status: ACTIVE | Noted: 2022-10-25

## 2023-04-26 PROCEDURE — 87389 HIV-1 AG W/HIV-1&-2 AB AG IA: CPT | Performed by: FAMILY MEDICINE

## 2023-04-27 PROBLEM — Z87.891 FORMER SMOKER, STOPPED SMOKING IN DISTANT PAST: Status: ACTIVE | Noted: 2023-04-27

## 2023-04-27 PROBLEM — M85.80 OSTEOPENIA: Status: ACTIVE | Noted: 2023-04-27

## 2023-11-14 PROBLEM — N18.31 CHRONIC KIDNEY DISEASE, STAGE 3A: Status: ACTIVE | Noted: 2023-11-14

## 2024-01-09 PROCEDURE — 87086 URINE CULTURE/COLONY COUNT: CPT | Performed by: FAMILY MEDICINE

## 2024-03-13 PROBLEM — I70.0 ATHEROSCLEROSIS OF AORTA: Status: ACTIVE | Noted: 2024-03-13

## 2024-04-30 PROBLEM — Z78.9 INTOLERANCE OF CONTINUOUS POSITIVE AIRWAY PRESSURE (CPAP) VENTILATION: Status: ACTIVE | Noted: 2024-04-30

## 2024-04-30 PROBLEM — G47.33 OSA (OBSTRUCTIVE SLEEP APNEA): Status: ACTIVE | Noted: 2024-04-30

## 2024-06-25 ENCOUNTER — OFFICE VISIT (OUTPATIENT)
Dept: URGENT CARE | Facility: CLINIC | Age: 64
End: 2024-06-25
Payer: COMMERCIAL

## 2024-06-25 ENCOUNTER — HOSPITAL ENCOUNTER (INPATIENT)
Facility: HOSPITAL | Age: 64
LOS: 6 days | Discharge: HOME | DRG: 872 | End: 2024-07-01
Attending: STUDENT IN AN ORGANIZED HEALTH CARE EDUCATION/TRAINING PROGRAM | Admitting: STUDENT IN AN ORGANIZED HEALTH CARE EDUCATION/TRAINING PROGRAM
Payer: COMMERCIAL

## 2024-06-25 VITALS
SYSTOLIC BLOOD PRESSURE: 150 MMHG | OXYGEN SATURATION: 97 % | HEIGHT: 64 IN | RESPIRATION RATE: 18 BRPM | BODY MASS INDEX: 24.41 KG/M2 | HEART RATE: 106 BPM | DIASTOLIC BLOOD PRESSURE: 86 MMHG | WEIGHT: 143 LBS | TEMPERATURE: 103 F

## 2024-06-25 DIAGNOSIS — N12 PYELONEPHRITIS: Primary | ICD-10-CM

## 2024-06-25 DIAGNOSIS — R07.9 CHEST PAIN: ICD-10-CM

## 2024-06-25 DIAGNOSIS — R50.9 FEVER, UNSPECIFIED FEVER CAUSE: Primary | ICD-10-CM

## 2024-06-25 DIAGNOSIS — R10.9 ABDOMINAL PAIN, UNSPECIFIED ABDOMINAL LOCATION: ICD-10-CM

## 2024-06-25 LAB
ALBUMIN SERPL-MCNC: 2.9 G/DL (ref 3.4–4.8)
ALBUMIN SERPL-MCNC: 3.3 G/DL (ref 3.4–4.8)
ALBUMIN/GLOB SERPL: 0.8 RATIO (ref 1.1–2)
ALBUMIN/GLOB SERPL: 1.1 RATIO (ref 1.1–2)
ALP SERPL-CCNC: 80 UNIT/L (ref 40–150)
ALP SERPL-CCNC: 91 UNIT/L (ref 40–150)
ALT SERPL-CCNC: 12 UNIT/L (ref 0–55)
ALT SERPL-CCNC: 30 UNIT/L (ref 0–55)
ANION GAP SERPL CALC-SCNC: 10 MEQ/L
ANION GAP SERPL CALC-SCNC: 8 MEQ/L
AST SERPL-CCNC: 15 UNIT/L (ref 5–34)
AST SERPL-CCNC: 63 UNIT/L (ref 5–34)
BACTERIA #/AREA URNS AUTO: ABNORMAL /HPF
BASOPHILS # BLD AUTO: 0.03 X10(3)/MCL
BASOPHILS NFR BLD AUTO: 0.3 %
BILIRUB SERPL-MCNC: 0.8 MG/DL
BILIRUB SERPL-MCNC: 0.9 MG/DL
BILIRUB UR QL STRIP.AUTO: NEGATIVE
BUN SERPL-MCNC: 9.3 MG/DL (ref 9.8–20.1)
BUN SERPL-MCNC: 9.6 MG/DL (ref 9.8–20.1)
CALCIUM SERPL-MCNC: 8.3 MG/DL (ref 8.4–10.2)
CALCIUM SERPL-MCNC: 9.6 MG/DL (ref 8.4–10.2)
CHLORIDE SERPL-SCNC: 103 MMOL/L (ref 98–107)
CHLORIDE SERPL-SCNC: 105 MMOL/L (ref 98–107)
CLARITY UR: ABNORMAL
CO2 SERPL-SCNC: 20 MMOL/L (ref 23–31)
CO2 SERPL-SCNC: 23 MMOL/L (ref 23–31)
COLOR UR AUTO: YELLOW
CREAT SERPL-MCNC: 0.95 MG/DL (ref 0.55–1.02)
CREAT SERPL-MCNC: 1.16 MG/DL (ref 0.55–1.02)
CREAT/UREA NIT SERPL: 10
CREAT/UREA NIT SERPL: 8
EOSINOPHIL # BLD AUTO: 0.01 X10(3)/MCL (ref 0–0.9)
EOSINOPHIL NFR BLD AUTO: 0.1 %
ERYTHROCYTE [DISTWIDTH] IN BLOOD BY AUTOMATED COUNT: 13.3 % (ref 11.5–17)
GFR SERPLBLD CREATININE-BSD FMLA CKD-EPI: 53 ML/MIN/1.73/M2
GFR SERPLBLD CREATININE-BSD FMLA CKD-EPI: >60 ML/MIN/1.73/M2
GLOBULIN SER-MCNC: 2.7 GM/DL (ref 2.4–3.5)
GLOBULIN SER-MCNC: 4.1 GM/DL (ref 2.4–3.5)
GLUCOSE SERPL-MCNC: 107 MG/DL (ref 82–115)
GLUCOSE SERPL-MCNC: 107 MG/DL (ref 82–115)
GLUCOSE UR QL STRIP: NORMAL
HCT VFR BLD AUTO: 40.4 % (ref 37–47)
HGB BLD-MCNC: 13.5 G/DL (ref 12–16)
HGB UR QL STRIP: ABNORMAL
IMM GRANULOCYTES # BLD AUTO: 0.03 X10(3)/MCL (ref 0–0.04)
IMM GRANULOCYTES NFR BLD AUTO: 0.3 %
KETONES UR QL STRIP: NEGATIVE
LACTATE SERPL-SCNC: 2.1 MMOL/L (ref 0.5–2.2)
LACTATE SERPL-SCNC: 4.1 MMOL/L (ref 0.5–2.2)
LEUKOCYTE ESTERASE UR QL STRIP: 500
LYMPHOCYTES # BLD AUTO: 1.04 X10(3)/MCL (ref 0.6–4.6)
LYMPHOCYTES NFR BLD AUTO: 10.2 %
MCH RBC QN AUTO: 30.3 PG (ref 27–31)
MCHC RBC AUTO-ENTMCNC: 33.4 G/DL (ref 33–36)
MCV RBC AUTO: 90.8 FL (ref 80–94)
MONOCYTES # BLD AUTO: 1.18 X10(3)/MCL (ref 0.1–1.3)
MONOCYTES NFR BLD AUTO: 11.6 %
MUCOUS THREADS URNS QL MICRO: ABNORMAL /LPF
NEUTROPHILS # BLD AUTO: 7.9 X10(3)/MCL (ref 2.1–9.2)
NEUTROPHILS NFR BLD AUTO: 77.5 %
NITRITE UR QL STRIP: NEGATIVE
NRBC BLD AUTO-RTO: 0 %
PH UR STRIP: 6.5 [PH]
PLATELET # BLD AUTO: 223 X10(3)/MCL (ref 130–400)
PMV BLD AUTO: 10.1 FL (ref 7.4–10.4)
POTASSIUM SERPL-SCNC: 4.1 MMOL/L (ref 3.5–5.1)
POTASSIUM SERPL-SCNC: 4.1 MMOL/L (ref 3.5–5.1)
PROT SERPL-MCNC: 5.6 GM/DL (ref 5.8–7.6)
PROT SERPL-MCNC: 7.4 GM/DL (ref 5.8–7.6)
PROT UR QL STRIP: ABNORMAL
RBC # BLD AUTO: 4.45 X10(6)/MCL (ref 4.2–5.4)
RBC #/AREA URNS AUTO: ABNORMAL /HPF
SODIUM SERPL-SCNC: 134 MMOL/L (ref 136–145)
SODIUM SERPL-SCNC: 135 MMOL/L (ref 136–145)
SP GR UR STRIP.AUTO: 1.01 (ref 1–1.03)
SQUAMOUS #/AREA URNS LPF: ABNORMAL /HPF
UROBILINOGEN UR STRIP-ACNC: NORMAL
WBC # BLD AUTO: 10.19 X10(3)/MCL (ref 4.5–11.5)
WBC #/AREA URNS AUTO: >100 /HPF
WBC CLUMPS UR QL AUTO: ABNORMAL

## 2024-06-25 PROCEDURE — 83605 ASSAY OF LACTIC ACID: CPT | Performed by: NURSE PRACTITIONER

## 2024-06-25 PROCEDURE — 99203 OFFICE O/P NEW LOW 30 MIN: CPT | Mod: ,,, | Performed by: PHYSICIAN ASSISTANT

## 2024-06-25 PROCEDURE — 63600175 PHARM REV CODE 636 W HCPCS: Performed by: PHYSICIAN ASSISTANT

## 2024-06-25 PROCEDURE — 96375 TX/PRO/DX INJ NEW DRUG ADDON: CPT

## 2024-06-25 PROCEDURE — 25000003 PHARM REV CODE 250: Performed by: STUDENT IN AN ORGANIZED HEALTH CARE EDUCATION/TRAINING PROGRAM

## 2024-06-25 PROCEDURE — 85025 COMPLETE CBC W/AUTO DIFF WBC: CPT | Performed by: PHYSICIAN ASSISTANT

## 2024-06-25 PROCEDURE — 80053 COMPREHEN METABOLIC PANEL: CPT

## 2024-06-25 PROCEDURE — 11000001 HC ACUTE MED/SURG PRIVATE ROOM

## 2024-06-25 PROCEDURE — 81003 URINALYSIS AUTO W/O SCOPE: CPT | Performed by: PHYSICIAN ASSISTANT

## 2024-06-25 PROCEDURE — 99285 EMERGENCY DEPT VISIT HI MDM: CPT | Mod: 25

## 2024-06-25 PROCEDURE — 63600175 PHARM REV CODE 636 W HCPCS: Performed by: NURSE PRACTITIONER

## 2024-06-25 PROCEDURE — 25000003 PHARM REV CODE 250: Performed by: PHYSICIAN ASSISTANT

## 2024-06-25 PROCEDURE — 96361 HYDRATE IV INFUSION ADD-ON: CPT

## 2024-06-25 PROCEDURE — 80053 COMPREHEN METABOLIC PANEL: CPT | Performed by: PHYSICIAN ASSISTANT

## 2024-06-25 PROCEDURE — 25000003 PHARM REV CODE 250

## 2024-06-25 PROCEDURE — 25000003 PHARM REV CODE 250: Performed by: NURSE PRACTITIONER

## 2024-06-25 PROCEDURE — 96374 THER/PROPH/DIAG INJ IV PUSH: CPT

## 2024-06-25 PROCEDURE — 87040 BLOOD CULTURE FOR BACTERIA: CPT | Performed by: NURSE PRACTITIONER

## 2024-06-25 PROCEDURE — 87077 CULTURE AEROBIC IDENTIFY: CPT | Performed by: PHYSICIAN ASSISTANT

## 2024-06-25 PROCEDURE — 25000242 PHARM REV CODE 250 ALT 637 W/ HCPCS: Performed by: STUDENT IN AN ORGANIZED HEALTH CARE EDUCATION/TRAINING PROGRAM

## 2024-06-25 PROCEDURE — 63600175 PHARM REV CODE 636 W HCPCS

## 2024-06-25 PROCEDURE — 87086 URINE CULTURE/COLONY COUNT: CPT | Performed by: PHYSICIAN ASSISTANT

## 2024-06-25 PROCEDURE — 25000003 PHARM REV CODE 250: Performed by: INTERNAL MEDICINE

## 2024-06-25 PROCEDURE — 25500020 PHARM REV CODE 255: Performed by: NURSE PRACTITIONER

## 2024-06-25 RX ORDER — HYDROMORPHONE HYDROCHLORIDE 2 MG/ML
1 INJECTION, SOLUTION INTRAMUSCULAR; INTRAVENOUS; SUBCUTANEOUS
Status: COMPLETED | OUTPATIENT
Start: 2024-06-25 | End: 2024-06-25

## 2024-06-25 RX ORDER — ACETAMINOPHEN 325 MG/1
650 TABLET ORAL
Status: COMPLETED | OUTPATIENT
Start: 2024-06-25 | End: 2024-06-25

## 2024-06-25 RX ORDER — ACETAMINOPHEN 325 MG/1
650 TABLET ORAL EVERY 4 HOURS PRN
Status: DISCONTINUED | OUTPATIENT
Start: 2024-06-25 | End: 2024-07-01 | Stop reason: HOSPADM

## 2024-06-25 RX ORDER — ALUMINUM HYDROXIDE, MAGNESIUM HYDROXIDE, AND SIMETHICONE 1200; 120; 1200 MG/30ML; MG/30ML; MG/30ML
30 SUSPENSION ORAL 4 TIMES DAILY PRN
Status: DISCONTINUED | OUTPATIENT
Start: 2024-06-25 | End: 2024-07-01 | Stop reason: HOSPADM

## 2024-06-25 RX ORDER — DILTIAZEM HYDROCHLORIDE 180 MG/1
180 CAPSULE, COATED, EXTENDED RELEASE ORAL 2 TIMES DAILY
Status: DISCONTINUED | OUTPATIENT
Start: 2024-06-26 | End: 2024-07-01 | Stop reason: HOSPADM

## 2024-06-25 RX ORDER — SODIUM CHLORIDE 0.9 % (FLUSH) 0.9 %
10 SYRINGE (ML) INJECTION EVERY 12 HOURS PRN
Status: DISCONTINUED | OUTPATIENT
Start: 2024-06-25 | End: 2024-07-01 | Stop reason: HOSPADM

## 2024-06-25 RX ORDER — ASPIRIN 81 MG/1
81 TABLET ORAL ONCE
Status: COMPLETED | OUTPATIENT
Start: 2024-06-26 | End: 2024-06-26

## 2024-06-25 RX ORDER — AMOXICILLIN 250 MG
1 CAPSULE ORAL 2 TIMES DAILY PRN
Status: DISCONTINUED | OUTPATIENT
Start: 2024-06-25 | End: 2024-07-01 | Stop reason: HOSPADM

## 2024-06-25 RX ORDER — SODIUM CHLORIDE 9 MG/ML
INJECTION, SOLUTION INTRAVENOUS CONTINUOUS
Status: ACTIVE | OUTPATIENT
Start: 2024-06-25 | End: 2024-06-26

## 2024-06-25 RX ORDER — PROCHLORPERAZINE EDISYLATE 5 MG/ML
5 INJECTION INTRAMUSCULAR; INTRAVENOUS EVERY 6 HOURS PRN
Status: DISCONTINUED | OUTPATIENT
Start: 2024-06-25 | End: 2024-07-01 | Stop reason: HOSPADM

## 2024-06-25 RX ORDER — LINACLOTIDE 145 UG/1
90 CAPSULE, GELATIN COATED ORAL
Status: ON HOLD | COMMUNITY
Start: 2024-06-12

## 2024-06-25 RX ORDER — TALC
6 POWDER (GRAM) TOPICAL NIGHTLY PRN
Status: DISCONTINUED | OUTPATIENT
Start: 2024-06-25 | End: 2024-07-01 | Stop reason: HOSPADM

## 2024-06-25 RX ORDER — IBUPROFEN 200 MG
24 TABLET ORAL
Status: DISCONTINUED | OUTPATIENT
Start: 2024-06-25 | End: 2024-07-01 | Stop reason: HOSPADM

## 2024-06-25 RX ORDER — IBUPROFEN 200 MG
16 TABLET ORAL
Status: DISCONTINUED | OUTPATIENT
Start: 2024-06-25 | End: 2024-07-01 | Stop reason: HOSPADM

## 2024-06-25 RX ORDER — ONDANSETRON HYDROCHLORIDE 2 MG/ML
4 INJECTION, SOLUTION INTRAVENOUS EVERY 6 HOURS PRN
Status: DISCONTINUED | OUTPATIENT
Start: 2024-06-25 | End: 2024-07-01 | Stop reason: HOSPADM

## 2024-06-25 RX ORDER — POLYETHYLENE GLYCOL 3350 17 G/17G
17 POWDER, FOR SOLUTION ORAL 2 TIMES DAILY PRN
Status: DISCONTINUED | OUTPATIENT
Start: 2024-06-25 | End: 2024-07-01 | Stop reason: HOSPADM

## 2024-06-25 RX ORDER — NALOXONE HCL 0.4 MG/ML
0.02 VIAL (ML) INJECTION
Status: DISCONTINUED | OUTPATIENT
Start: 2024-06-25 | End: 2024-07-01 | Stop reason: HOSPADM

## 2024-06-25 RX ORDER — MORPHINE SULFATE 4 MG/ML
2 INJECTION, SOLUTION INTRAMUSCULAR; INTRAVENOUS EVERY 4 HOURS PRN
Status: DISCONTINUED | OUTPATIENT
Start: 2024-06-25 | End: 2024-06-25

## 2024-06-25 RX ORDER — GLUCAGON 1 MG
1 KIT INJECTION
Status: DISCONTINUED | OUTPATIENT
Start: 2024-06-25 | End: 2024-07-01 | Stop reason: HOSPADM

## 2024-06-25 RX ORDER — OXYCODONE HYDROCHLORIDE 5 MG/1
5 TABLET ORAL EVERY 6 HOURS PRN
Status: DISCONTINUED | OUTPATIENT
Start: 2024-06-25 | End: 2024-07-01 | Stop reason: HOSPADM

## 2024-06-25 RX ORDER — IPRATROPIUM BROMIDE AND ALBUTEROL SULFATE 2.5; .5 MG/3ML; MG/3ML
3 SOLUTION RESPIRATORY (INHALATION) EVERY 4 HOURS PRN
Status: DISCONTINUED | OUTPATIENT
Start: 2024-06-25 | End: 2024-07-01 | Stop reason: HOSPADM

## 2024-06-25 RX ORDER — ONDANSETRON HYDROCHLORIDE 2 MG/ML
4 INJECTION, SOLUTION INTRAVENOUS
Status: COMPLETED | OUTPATIENT
Start: 2024-06-25 | End: 2024-06-25

## 2024-06-25 RX ORDER — PRAMIPEXOLE DIHYDROCHLORIDE 0.12 MG/1
0.12 TABLET ORAL 3 TIMES DAILY
Status: DISCONTINUED | OUTPATIENT
Start: 2024-06-26 | End: 2024-07-01 | Stop reason: HOSPADM

## 2024-06-25 RX ADMIN — SODIUM CHLORIDE, POTASSIUM CHLORIDE, SODIUM LACTATE AND CALCIUM CHLORIDE 1000 ML: 600; 310; 30; 20 INJECTION, SOLUTION INTRAVENOUS at 05:06

## 2024-06-25 RX ADMIN — IPRATROPIUM BROMIDE AND ALBUTEROL SULFATE 3 ML: .5; 3 SOLUTION RESPIRATORY (INHALATION) at 07:06

## 2024-06-25 RX ADMIN — ONDANSETRON 4 MG: 2 INJECTION INTRAMUSCULAR; INTRAVENOUS at 08:06

## 2024-06-25 RX ADMIN — CEFTRIAXONE SODIUM 1 G: 1 INJECTION, POWDER, FOR SOLUTION INTRAMUSCULAR; INTRAVENOUS at 06:06

## 2024-06-25 RX ADMIN — OXYCODONE HYDROCHLORIDE 5 MG: 5 TABLET ORAL at 09:06

## 2024-06-25 RX ADMIN — IOHEXOL 94 ML: 350 INJECTION, SOLUTION INTRAVENOUS at 04:06

## 2024-06-25 RX ADMIN — ONDANSETRON 4 MG: 2 INJECTION INTRAMUSCULAR; INTRAVENOUS at 03:06

## 2024-06-25 RX ADMIN — SODIUM CHLORIDE, POTASSIUM CHLORIDE, SODIUM LACTATE AND CALCIUM CHLORIDE 1000 ML: 600; 310; 30; 20 INJECTION, SOLUTION INTRAVENOUS at 03:06

## 2024-06-25 RX ADMIN — SODIUM CHLORIDE: 9 INJECTION, SOLUTION INTRAVENOUS at 08:06

## 2024-06-25 RX ADMIN — HYDROMORPHONE HYDROCHLORIDE 1 MG: 2 INJECTION INTRAMUSCULAR; INTRAVENOUS; SUBCUTANEOUS at 06:06

## 2024-06-25 RX ADMIN — HYDROMORPHONE HYDROCHLORIDE 1 MG: 2 INJECTION INTRAMUSCULAR; INTRAVENOUS; SUBCUTANEOUS at 04:06

## 2024-06-25 RX ADMIN — ACETAMINOPHEN 650 MG: 325 TABLET, FILM COATED ORAL at 07:06

## 2024-06-25 RX ADMIN — ACETAMINOPHEN 650 MG: 325 TABLET, FILM COATED ORAL at 02:06

## 2024-06-25 NOTE — PROGRESS NOTES
"Subjective:      Patient ID: Thea Major is a 64 y.o. female.    Vitals:  height is 5' 4" (1.626 m) and weight is 64.9 kg (143 lb). Her temperature is 103 °F (39.4 °C) (abnormal). Her blood pressure is 150/86 (abnormal) and her pulse is 106. Her respiration is 18 and oxygen saturation is 97%.     Chief Complaint: Generalized Body Aches     Patient is a 64 y.o. female who presents to urgent care with complaints of fever, bad headache, chills, vomiting, nausea, right-sided abdominal pain, and body aches x 3 days.  Patient has a history of a nephrectomy and cholecystectomy.  Denies sore throat cough or dysuria.    ROS   Objective:     Physical Exam   Constitutional: She is oriented to person, place, and time. She appears well-developed. She is cooperative.  Non-toxic appearance. She does not appear ill. No distress.   HENT:   Head: Normocephalic and atraumatic.   Ears:   Right Ear: Hearing, tympanic membrane, external ear and ear canal normal.   Left Ear: Hearing, tympanic membrane, external ear and ear canal normal.   Nose: Nose normal. No nasal deformity. No epistaxis.   Mouth/Throat: Uvula is midline, oropharynx is clear and moist and mucous membranes are normal. No trismus in the jaw. Normal dentition. No uvula swelling. No oropharyngeal exudate, posterior oropharyngeal edema or posterior oropharyngeal erythema.   Eyes: Conjunctivae and lids are normal. No scleral icterus.   Neck: Trachea normal and phonation normal. Neck supple. No edema present. No erythema present. No neck rigidity present.   Cardiovascular: Normal rate, regular rhythm, normal heart sounds and normal pulses.   Pulmonary/Chest: Effort normal and breath sounds normal. No respiratory distress. She has no decreased breath sounds. She has no rhonchi.   Abdominal: Normal appearance and bowel sounds are normal. She exhibits no distension and no mass. Soft. There is abdominal tenderness.   Musculoskeletal: Normal range of motion.         General: " No deformity. Normal range of motion.   Neurological: She is alert and oriented to person, place, and time. She has normal strength. She exhibits normal muscle tone. Coordination normal.   Skin: Skin is warm, dry, intact, not diaphoretic and not pale.   Psychiatric: Her speech is normal and behavior is normal. Judgment and thought content normal.   Nursing note and vitals reviewed.  Right-sided abdominal TTP         Previous History      Review of patient's allergies indicates:  No Known Allergies    Past Medical History:   Diagnosis Date    Allergy     Anxiety     Hypertension 2010    Shingles      Current Outpatient Medications   Medication Instructions    albuterol (PROVENTIL/VENTOLIN HFA) 90 mcg/actuation inhaler INHALE TWO PUFFS INTO LUNGS EVERY 4 HOURS AS NEEDED SHORTNESS OF BREATH OR WEEZING    aspirin (ECOTRIN) 81 mg, Oral    bisacodyL (DULCOLAX) 10 mg, Oral, Nightly    buPROPion (WELLBUTRIN SR) 150 mg, Oral, 2 times daily    compress.stocking,knee,reg,med Misc 18-20mmHG  Knee high compression stocking bilateral; use daily as directed<BR>DX: Bilateral Lymphedema<BR>NPI [id number]<BR>DEMETRICE 12 mth    diltiaZEM (CARDIZEM CD) 180 mg, Oral, 2 times daily    fluticasone-umeclidin-vilanter (TRELEGY ELLIPTA) 200-62.5-25 mcg inhaler 1 puff, Inhalation, Daily    hydrocortisone (ANUSOL-HC) 25 mg suppository INSERT ONE SUPPOSITORY RECTALLY AS NEEDED FOR HEMORRHOIDS    levocetirizine (XYZAL) 5 mg, Oral, Nightly    LINZESS 145 mcg Cap capsule 90 capsules    montelukast (SINGULAIR) 10 mg, Oral, Nightly    omeprazole (PRILOSEC) 40 mg, Oral    ondansetron (ZOFRAN-ODT) 8 mg, Oral, Every 8 hours, PRN nausea    pramipexole (MIRAPEX) 0.125 mg, Oral, 3 times daily    sucralfate (CARAFATE) 1 g, Oral, 4 times daily     Past Surgical History:   Procedure Laterality Date    BREAST BIOPSY  1981    benign    CHOLECYSTECTOMY  2-13-20    DILATION AND CURETTAGE OF UTERUS  1993    ROBOT-ASSISTED LAPAROSCOPIC DONOR NEPHRECTOMY Left 12/10/2018  "   Procedure: ROBOTIC NEPHRECTOMY, DONOR;  Surgeon: Petra Galeas MD;  Location: [address];  Service: Transplant;  Laterality: Left;    TUBAL LIGATION       Family History   Problem Relation Name Age of Onset    COPD Father Tunde     Lung disease Father Tunde     Tuberculosis Father Tunde     Asbestos Father Tunde     Cancer Father Tunde     Aneurysm Mother      No Known Problems Sister         Social History     Tobacco Use    Smoking status: Former     Current packs/day: 0.00     Average packs/day: 1 pack/day for 30.0 years (30.0 ttl pk-yrs)     Types: Cigarettes     Start date: 1986     Quit date: 2016     Years since quittin.3    Smokeless tobacco: Never   Substance Use Topics    Alcohol use: Yes     Alcohol/week: 3.0 standard drinks of alcohol     Types: 3 Cans of beer per week     Comment: 2 x/ month    Drug use: Not Currently     Types: Marijuana     Comment: occaisonal--last use 2018        Physical Exam      Vital Signs Reviewed   BP (!) 150/86   Pulse 106   Temp (!) 103 °F (39.4 °C)   Resp 18   Ht 5' 4" (1.626 m)   Wt 64.9 kg (143 lb)   SpO2 97%   BMI 24.55 kg/m²        Procedures    Procedures     Labs     Results for orders placed or performed in visit on 24   Comprehensive Metabolic Panel   Result Value Ref Range    Sodium 141 136 - 145 mmol/L    Potassium 5.0 3.5 - 5.1 mmol/L    Chloride 104 98 - 107 mmol/L    CO2 29 21 - 32 mmol/L    Glucose 97 74 - 106 mg/dL    Blood Urea Nitrogen 15.0 7.0 - 18.0 mg/dL    Creatinine 1.06 0.60 - 1.30 mg/dL    Calcium 10.1 8.5 - 10.1 mg/dL    Protein Total 6.6 6.4 - 8.2 gm/dL    Albumin 4.2 3.4 - 5.0 g/dL    Globulin 2.4 1.2 - 3.0 gm/dL    Albumin/Globulin Ratio 1.8 1.5 - 2.0 ratio    Bilirubin Total 0.6 0.2 - 1.0 mg/dL    ALP 74 38 - 126 unit/L    ALT 11 7 - 52 unit/L    AST 16 15 - 37 unit/L    eGFR 60 mls/min/1.73/m2   Bilirubin, Direct   Result Value Ref Range    Bilirubin Direct 0.1 0.0 - 0.5 mg/dL   Lipid Panel "   Result Value Ref Range    Cholesterol Total 239 (H) 0 - 200 mg/dL    HDL Cholesterol 97 (H) 35 - 60 mg/dL    Triglyceride 85 30 - 150 mg/dL    Cholesterol/HDL Ratio 2     Very Low Density Lipoprotein 17     LDL Cholesterol 125.00 0.00 - 129.00 mg/dL   Urinalysis   Result Value Ref Range    Color, UA Yellow Yellow, Light-Yellow, Dark Yellow, Shanel, Straw    Appearance, UA Clear Clear    Specific Gravity, UA <1.005 (L) 1.005 - 1.030    pH, UA 6.5 5.0 - 8.5    Protein, UA Negative Negative    Glucose, UA Negative Negative, Normal    Ketones, UA Negative Negative    Blood, UA Trace-Intact (A) Negative    Bilirubin, UA Negative Negative    Urobilinogen, UA 0.2 0.2, 1.0, Normal    Nitrites, UA Negative Negative    Leukocyte Esterase, UA Negative Negative   TSH   Result Value Ref Range    TSH 1.002 0.350 - 4.940 uIU/mL   CBC with Differential   Result Value Ref Range    WBC 6.30 4.50 - 11.50 x10(3)/mcL    RBC 4.51 4.20 - 5.40 x10(6)/mcL    Hgb 13.6 12.0 - 16.0 g/dL    Hct 41.2 37.0 - 47.0 %    MCV 91.4 80.0 - 94.0 fL    MCH 30.2 27.0 - 31.0 pg    MCHC 33.0 33.0 - 36.0 g/dL    RDW 12.8 11.5 - 17.0 %    Platelet 296 130 - 400 x10(3)/mcL    MPV 9.2 7.4 - 10.4 fL    Neut % 65.5 %    Lymph % 22.7 %    Mono % 11.8 %    Lymph # 1.4 0.6 - 4.6 x10(3)/mcL    Neut # 4.2 2.1 - 9.2 x10(3)/mcL    Mono # 0.7 0.1 - 1.3 x10(3)/mcL   Urinalysis, Microscopic   Result Value Ref Range    Bacteria, UA None Seen None Seen, Rare, Occasional /HPF    RBC, UA 0-2 None Seen, 0-2, 3-5, 0-5 /HPF    WBC, UA None Seen None Seen, 0-2, 3-5, 0-5 /HPF    Squamous Epithelial Cells, UA None Seen None Seen, Rare, Occasional, Occ /HPF       Assessment:     1. Fever, unspecified fever cause    2. Abdominal pain, unspecified abdominal location        Plan:       Fever, unspecified fever cause    Abdominal pain, unspecified abdominal location          As discussed, it is recommended that you present to the ER now for further evaluation to prevent a delay in care.    Offered transport via EMS but patient/family decline.  Opts for private transport via personal vehicle.

## 2024-06-25 NOTE — ED PROVIDER NOTES
Encounter Date: 2024       History     Chief Complaint   Patient presents with    Abdominal Pain     R sided abd pain/fever (tmax 103) x2 days. Vomiting/fatigue x3 days. Seen at  this AM, told to come to ER to r/o appendicitis. Hx unilateral kidney (R), gall bladder removal, L lung resection d/t fungal infection. Denies tylenol/motrin today.     See MDM    The history is provided by the patient. No  was used.     Review of patient's allergies indicates:  No Known Allergies  Past Medical History:   Diagnosis Date    Allergy     Anxiety     Hypertension 2010    Shingles      Past Surgical History:   Procedure Laterality Date    BREAST BIOPSY      benign    CHOLECYSTECTOMY  20    DILATION AND CURETTAGE OF UTERUS      ROBOT-ASSISTED LAPAROSCOPIC DONOR NEPHRECTOMY Left 12/10/2018    Procedure: ROBOTIC NEPHRECTOMY, DONOR;  Surgeon: Petra Galeas MD;  Location: [facility] OR [address] [place] [address];  Service: Transplant;  Laterality: Left;    TUBAL LIGATION       Family History   Problem Relation Name Age of Onset    COPD Father Tunde     Lung disease Father Tunde     Tuberculosis Father Tunde     Asbestos Father Tunde     Cancer Father Tunde     Aneurysm Mother      No Known Problems Sister       Social History     Tobacco Use    Smoking status: Former     Current packs/day: 0.00     Average packs/day: 1 pack/day for 30.0 years (30.0 ttl pk-yrs)     Types: Cigarettes     Start date: 1986     Quit date: 2016     Years since quittin.3    Smokeless tobacco: Never   Substance Use Topics    Alcohol use: Yes     Alcohol/week: 3.0 standard drinks of alcohol     Types: 3 Cans of beer per week     Comment: 2 x/ month    Drug use: Not Currently     Types: Marijuana     Comment: occaisonal--last use 2018     Review of Systems   Constitutional:  Positive for fatigue and fever.   Respiratory:  Negative for cough and shortness of breath.    Cardiovascular:  Negative for chest pain.    Gastrointestinal:  Positive for abdominal pain, nausea and vomiting.   Genitourinary:  Negative for difficulty urinating and dysuria.   Musculoskeletal:  Negative for gait problem.   Skin:  Negative for color change.   Neurological:  Negative for dizziness, speech difficulty and headaches.   Psychiatric/Behavioral:  Negative for hallucinations and suicidal ideas.    All other systems reviewed and are negative.      Physical Exam     Initial Vitals [06/25/24 1358]   BP Pulse Resp Temp SpO2   135/86 103 (!) 22 (!) 102 °F (38.9 °C) 96 %      MAP       --         Physical Exam    Nursing note and vitals reviewed.  Constitutional: She appears well-developed and well-nourished.   HENT:   Head: Normocephalic.   Eyes: EOM are normal.   Neck:   Normal range of motion.  Cardiovascular:  Normal rate, regular rhythm, normal heart sounds and intact distal pulses.           Pulmonary/Chest: Breath sounds normal. No respiratory distress.   Abdominal: Abdomen is soft. Bowel sounds are normal. There is abdominal tenderness.   Musculoskeletal:         General: Normal range of motion.      Cervical back: Normal range of motion.     Neurological: She is alert and oriented to person, place, and time. She has normal strength.   Skin: Skin is warm and dry.   Psychiatric: She has a normal mood and affect. Her behavior is normal. Judgment and thought content normal.         ED Course   Procedures  Labs Reviewed   COMPREHENSIVE METABOLIC PANEL - Abnormal; Notable for the following components:       Result Value    Sodium 134 (*)     Blood Urea Nitrogen 9.6 (*)     Creatinine 1.16 (*)     Albumin 3.3 (*)     Globulin 4.1 (*)     Albumin/Globulin Ratio 0.8 (*)     All other components within normal limits   URINALYSIS, REFLEX TO URINE CULTURE - Abnormal; Notable for the following components:    Appearance, UA Turbid (*)     Protein, UA 1+ (*)     Blood, UA 1+ (*)     Leukocyte Esterase,  (*)     RBC, UA 11-20 (*)     WBC, UA >100 (*)      WBC Clumps, UA Moderate (*)     Bacteria, UA Moderate (*)     Mucous, UA Trace (*)     All other components within normal limits   CULTURE, URINE   BLOOD CULTURE [facility]   BLOOD CULTURE [facility]   CBC W/ AUTO DIFFERENTIAL    Narrative:     The following orders were created for panel order CBC auto differential.  Procedure                               Abnormality         Status                     ---------                               -----------         ------                     CBC with Differential[[id number]]                           Final result                 Please view results for these tests on the individual orders.   CBC WITH DIFFERENTIAL   LACTIC ACID, PLASMA          Imaging Results              CT Abdomen Pelvis With IV Contrast NO Oral Contrast (Final result)  Result time 06/25/24 16:55:01      Final result by Deirdre Farmer MD (06/25/24 16:55:01)                   Impression:      Findings concerning for right-sided urinary infection and pyelonephritis with mild inflammatory changes.  No drainable fluid collection.      Electronically signed by: Deirdre Farmer  Date:    06/25/2024  Time:    16:55               Narrative:    EXAMINATION:  CT ABDOMEN PELVIS WITH IV CONTRAST    CLINICAL HISTORY:  RLQ abdominal pain (Age >= 14y);    TECHNIQUE:  CT imaging was performed of the abdomen and pelvis after the administration of intravenous contrast. Dose length product is 334 mGycm. Automatic exposure control, adjustment of mA/kV or iterative reconstruction technique was used to limit radiation dose.    COMPARISON:  CTA abdomen pelvis dated 06/27/2018    FINDINGS:  Liver: Normal.    Gallbladder and biliary tree: The gallbladder has been surgically resected.  Mild biliary dilatation is likely postoperative.    Pancreas: Normal.    Spleen: Normal.    Adrenals: Normal.    Kidneys and ureters: The left kidney has been surgically resected.  There is mild dilatation of the right renal pelvis with diffuse  urothelial enhancement in the renal pelvis and ureter.  There is also heterogeneous cortical enhancement of the right kidney with mild surrounding inflammatory changes.    Bladder: Normal.    Reproductive organs: No pelvic masses.    Stomach/bowel: No evidence of bowel obstruction. Appendix is normal. No discernible bowel inflammation.    Lymph nodes: No pathologically enlarged lymph node identified.    Peritoneum: No ascites or free air. No fluid collection.    Vessels: Moderate vascular calcifications.    Abdominal wall: Normal.    Lung bases: Emphysematous changes in the lung bases.    Bones: No acute osseous findings.                                       Medications   lactated ringers bolus 1,000 mL (1,000 mLs Intravenous New Bag 6/25/24 1719)   cefTRIAXone (Rocephin) 1 g in D5W 100 mL IVPB (MB+) (has no administration in time range)   acetaminophen tablet 650 mg (650 mg Oral Given 6/25/24 1402)   lactated ringers bolus 1,000 mL (0 mLs Intravenous Stopped 6/25/24 1613)   ondansetron injection 4 mg (4 mg Intravenous Given 6/25/24 1517)   HYDROmorphone (PF) injection 1 mg (1 mg Intravenous Given 6/25/24 1621)   iohexoL (OMNIPAQUE 350) injection 94 mL (94 mLs Intravenous Given 6/25/24 1644)     Medical Decision Making  Historian:  Patient.  Patient is a 64-year-old female  that presents with abdominal pain that has been present 2 days. Associated symptoms fever, vomiting, nausea, fatigue. Surrounding information is went to urgent care and sent to the ER. Exacerbated by nothing. Relieved by nothing. Patient treatment prior to arrival none. Risk factors include none. Other history pertaining to this complaint nothing.   Assessment:  See physical exam.  DD:  Pyelonephritis, appendicitis, diverticulitis, colitis, cholecystitis, cholelithiasis, urolithiasis, choledocholithiasis  ED Course: History was obtained.  Physical was performed.  Patient has pyelonephritis.  She has 1 kidney so she is high risk.  [facility]  Did get blood cultures and lactic acid.  Gave her another L LR.. Medical or surgical consults:  Hospital Medicine. Social determinants that affect healthcare:  None.       Amount and/or Complexity of Data Reviewed  Independent Historian:      Details: Sister gave additional information on history of present illness  External Data Reviewed: notes.     Details: Reviewed outside notes from 05/09/2024 and urgent care visit from today.  Labs: ordered.     Details: Lab shows a urinary tract infection  Radiology: independent interpretation performed.     Details: CT shows pyelonephritis    Risk  Decision regarding hospitalization.                                      Clinical Impression:  Final diagnoses:  [N12] Pyelonephritis (Primary)          ED Disposition Condition    Admit Stable                Tobias Lundy, FNP  06/25/24 2181

## 2024-06-26 LAB
BASOPHILS # BLD AUTO: 0.02 X10(3)/MCL
BASOPHILS NFR BLD AUTO: 0.3 %
EOSINOPHIL # BLD AUTO: 0 X10(3)/MCL (ref 0–0.9)
EOSINOPHIL NFR BLD AUTO: 0 %
ERYTHROCYTE [DISTWIDTH] IN BLOOD BY AUTOMATED COUNT: 13.7 % (ref 11.5–17)
HCT VFR BLD AUTO: 35.5 % (ref 37–47)
HGB BLD-MCNC: 11.4 G/DL (ref 12–16)
IMM GRANULOCYTES # BLD AUTO: 0.03 X10(3)/MCL (ref 0–0.04)
IMM GRANULOCYTES NFR BLD AUTO: 0.4 %
LACTATE SERPL-SCNC: 0.7 MMOL/L (ref 0.5–2.2)
LACTATE SERPL-SCNC: 0.8 MMOL/L (ref 0.5–2.2)
LACTATE SERPL-SCNC: 0.9 MMOL/L (ref 0.5–2.2)
LACTATE SERPL-SCNC: 1.1 MMOL/L (ref 0.5–2.2)
LACTATE SERPL-SCNC: 1.2 MMOL/L (ref 0.5–2.2)
LACTATE SERPL-SCNC: 1.3 MMOL/L (ref 0.5–2.2)
LACTATE SERPL-SCNC: 1.4 MMOL/L (ref 0.5–2.2)
LYMPHOCYTES # BLD AUTO: 0.88 X10(3)/MCL (ref 0.6–4.6)
LYMPHOCYTES NFR BLD AUTO: 12.2 %
MCH RBC QN AUTO: 30 PG (ref 27–31)
MCHC RBC AUTO-ENTMCNC: 32.1 G/DL (ref 33–36)
MCV RBC AUTO: 93.4 FL (ref 80–94)
MONOCYTES # BLD AUTO: 0.82 X10(3)/MCL (ref 0.1–1.3)
MONOCYTES NFR BLD AUTO: 11.4 %
NEUTROPHILS # BLD AUTO: 5.45 X10(3)/MCL (ref 2.1–9.2)
NEUTROPHILS NFR BLD AUTO: 75.7 %
NRBC BLD AUTO-RTO: 0 %
PLATELET # BLD AUTO: 181 X10(3)/MCL (ref 130–400)
PMV BLD AUTO: 9.8 FL (ref 7.4–10.4)
RBC # BLD AUTO: 3.8 X10(6)/MCL (ref 4.2–5.4)
WBC # BLD AUTO: 7.2 X10(3)/MCL (ref 4.5–11.5)

## 2024-06-26 PROCEDURE — 36415 COLL VENOUS BLD VENIPUNCTURE: CPT

## 2024-06-26 PROCEDURE — 25000003 PHARM REV CODE 250: Performed by: INTERNAL MEDICINE

## 2024-06-26 PROCEDURE — 83605 ASSAY OF LACTIC ACID: CPT

## 2024-06-26 PROCEDURE — 63600175 PHARM REV CODE 636 W HCPCS: Performed by: NURSE PRACTITIONER

## 2024-06-26 PROCEDURE — 63600175 PHARM REV CODE 636 W HCPCS

## 2024-06-26 PROCEDURE — 25000242 PHARM REV CODE 250 ALT 637 W/ HCPCS: Performed by: STUDENT IN AN ORGANIZED HEALTH CARE EDUCATION/TRAINING PROGRAM

## 2024-06-26 PROCEDURE — 11000001 HC ACUTE MED/SURG PRIVATE ROOM

## 2024-06-26 PROCEDURE — 25000003 PHARM REV CODE 250: Performed by: NURSE PRACTITIONER

## 2024-06-26 PROCEDURE — 85025 COMPLETE CBC W/AUTO DIFF WBC: CPT

## 2024-06-26 PROCEDURE — 25000003 PHARM REV CODE 250: Performed by: STUDENT IN AN ORGANIZED HEALTH CARE EDUCATION/TRAINING PROGRAM

## 2024-06-26 PROCEDURE — 21400001 HC TELEMETRY ROOM

## 2024-06-26 RX ORDER — FLUTICASONE FUROATE AND VILANTEROL 200; 25 UG/1; UG/1
1 POWDER RESPIRATORY (INHALATION) DAILY
Status: DISCONTINUED | OUTPATIENT
Start: 2024-06-26 | End: 2024-07-01 | Stop reason: HOSPADM

## 2024-06-26 RX ADMIN — ONDANSETRON 4 MG: 2 INJECTION INTRAMUSCULAR; INTRAVENOUS at 05:06

## 2024-06-26 RX ADMIN — SODIUM CHLORIDE: 9 INJECTION, SOLUTION INTRAVENOUS at 12:06

## 2024-06-26 RX ADMIN — DILTIAZEM HYDROCHLORIDE 180 MG: 180 CAPSULE, COATED, EXTENDED RELEASE ORAL at 08:06

## 2024-06-26 RX ADMIN — OXYCODONE HYDROCHLORIDE 5 MG: 5 TABLET ORAL at 04:06

## 2024-06-26 RX ADMIN — CEFTRIAXONE SODIUM 1 G: 1 INJECTION, POWDER, FOR SOLUTION INTRAMUSCULAR; INTRAVENOUS at 05:06

## 2024-06-26 RX ADMIN — PRAMIPEXOLE DIHYDROCHLORIDE 0.12 MG: 0.12 TABLET ORAL at 12:06

## 2024-06-26 RX ADMIN — DILTIAZEM HYDROCHLORIDE 180 MG: 180 CAPSULE, COATED, EXTENDED RELEASE ORAL at 12:06

## 2024-06-26 RX ADMIN — ASPIRIN 81 MG: 81 TABLET, COATED ORAL at 12:06

## 2024-06-26 RX ADMIN — LINACLOTIDE 145 MCG: 145 CAPSULE, GELATIN COATED ORAL at 04:06

## 2024-06-26 RX ADMIN — PRAMIPEXOLE DIHYDROCHLORIDE 0.12 MG: 0.12 TABLET ORAL at 02:06

## 2024-06-26 RX ADMIN — PRAMIPEXOLE DIHYDROCHLORIDE 0.12 MG: 0.12 TABLET ORAL at 08:06

## 2024-06-26 RX ADMIN — ONDANSETRON 4 MG: 2 INJECTION INTRAMUSCULAR; INTRAVENOUS at 11:06

## 2024-06-26 RX ADMIN — OXYCODONE HYDROCHLORIDE 5 MG: 5 TABLET ORAL at 06:06

## 2024-06-26 RX ADMIN — FLUTICASONE FUROATE AND VILANTEROL TRIFENATATE 1 PUFF: 200; 25 POWDER RESPIRATORY (INHALATION) at 08:06

## 2024-06-26 RX ADMIN — OXYCODONE HYDROCHLORIDE 5 MG: 5 TABLET ORAL at 11:06

## 2024-06-26 NOTE — PLAN OF CARE
Problem: Infection  Goal: Absence of Infection Signs and Symptoms  Outcome: Progressing     Problem: Adult Inpatient Plan of Care  Goal: Plan of Care Review  Outcome: Progressing  Goal: Patient-Specific Goal (Individualized)  Outcome: Progressing  Goal: Absence of Hospital-Acquired Illness or Injury  Outcome: Progressing  Goal: Optimal Comfort and Wellbeing  Outcome: Progressing  Goal: Readiness for Transition of Care  Outcome: Progressing

## 2024-06-26 NOTE — H&P
[facility] Hospital Medicine - H&P Note    Patient Name: Thea aMjor  : 1960  MRN: [id number]  PCP: Fish Courtney MD  Admitting Physician: Brayan Sellers MD  Admission Class: IP- Inpatient   Length of Stay: 0  Face-to-Face encounter date: 2024  Code status:  Full    Chief Complaint   Abdominal Pain (R sided abd pain/fever (tmax 103) x2 days. Vomiting/fatigue x3 days. Seen at  this AM, told to come to ER to r/o appendicitis. Hx unilateral kidney (R), gall bladder removal, L lung resection d/t fungal infection. 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  Patient reported no known inciting incident, trauma to the abdomen/flank.  Patient reports that the pain feels constant, worsening, 10/10, nonradiating, and described as sharp/stabbing.  Patient originally went to urgent care and after her vitals were taken and seeing that she had a fever of 103 and a blood pressure of 150/86 she was sent to the ED. Patient denies any exacerbating or alleviating factors, treatments tried at home, history of similar episodes, dysuria, hematuria, chest pain, shortness of breath, AMS/LOC.      ED course:  Vital signs significant for temp max of 103.3, , respirations 24.  Episodes of mild hypertension but otherwise normotensive.  CBC insignificant.  BMP shows a BUN of 9.6 and a creatinine of 1.16.  Lactic acid 4.1.  UA shows evident UTI hematuria.  CT abdomen/pelvis with IV contrast shows a right-sided UTI and pyelonephritis with mild inflammatory changes but with no drainable fluid collection.  Since arrival fever initially  went down to 99.6 in the most recently spiked back up to 103.3.  ED treated the patient with 2 L of LR bolus, IV Zofran, IV Dilaudid, Tylenol, and Rocephin 1 g IV every 24 hours.    We will admit the patient for Sepsis secondary to pyelonephritis.  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    Procedure: ROBOTIC NEPHRECTOMY, DONOR;  Surgeon: Petra Galeas MD;  Location: [facility] OR [address] [place] [address];  Service: Transplant;  Laterality: Left;    TUBAL LIGATION         Social History     Screening for Social Drivers for health: Patient screened for food insecurity, housing instability, transportation needs, utility   difficulties, and interpersonal safety (select all that apply as identified as concern)  []Housing or Food  []Transportation Needs  []Utility Difficulties  []Interpersonal safety  [x]None    Social History     Tobacco Use    Smoking status: Former     Current packs/day: 0.00     Average packs/day: 1 pack/day for 30.0 years (30.0 ttl pk-yrs)     Types: Cigarettes     Start date: 1986     Quit date: 2016     Years since quittin.3    Smokeless tobacco: Never   Substance Use Topics    Alcohol use: Yes     Alcohol/week: 3.0 standard drinks of alcohol     Types: 3 Cans of beer per week     Comment: 2 x/ month        Family History   Reviewed and negative    Allergies   Patient has no known allergies.    Home Medications     Prior to Admission medications    Medication Sig Start Date End Date Taking? Authorizing Provider   aspirin (ECOTRIN) 81 MG EC tablet Take 81 mg by mouth. 10/22/21  Yes Provider, Historical    diltiaZEM (CARDIZEM CD) 180 MG 24 hr capsule Take 1 capsule (180 mg total) by mouth 2 (two) times a day. 12/21/23  Yes Fish Courtney MD   levocetirizine (XYZAL) 5 MG tablet Take 1 tablet (5 mg total) by mouth every evening. 4/30/24  Yes Cici Hernandez NP   LINZESS 145 mcg Cap capsule 90 capsules. 6/12/24  Yes Provider, Historical   montelukast (SINGULAIR) 10 mg tablet Take 1 tablet (10 mg total) by mouth every evening. 4/30/24  Yes Cici Hernandez NP   omeprazole (PRILOSEC) 40 MG capsule TAKE ONE CAPSULE BY MOUTH ONCE DAILY 5/15/24  Yes Fish Courtney MD   pramipexole (MIRAPEX) 0.125 MG tablet Take 1 tablet (0.125 mg total) by mouth 3 (three) times daily. 4/30/24  Yes Cici Hernandez NP   albuterol (PROVENTIL/VENTOLIN HFA) 90 mcg/actuation inhaler INHALE TWO PUFFS INTO LUNGS EVERY 4 HOURS AS NEEDED SHORTNESS OF BREATH OR WEEZING 9/26/22   Ari Weinberg MD   bisacodyl (DULCOLAX) 5 mg EC tablet Take 2 tablets (10 mg total) by mouth every evening.  Patient not taking: Reported on 6/25/2024 12/11/18   Anayeli Holloway PA-C   buPROPion (WELLBUTRIN SR) 150 MG TBSR 12 hr tablet Take 1 tablet (150 mg total) by mouth 2 (two) times daily.  Patient not taking: Reported on 6/25/2024 4/30/24   Cici Hernandez NP   compress.stocking,knee,reg,med Misc 18-20mmHG  Knee high compression stocking bilateral; use daily as directed  DX: Bilateral Lymphedema  NPI [id number]  DEMETRICE 12 mth 3/13/24   Fish Courtney MD   fluticasone-umeclidin-vilanter (TRELEGY ELLIPTA) 200-62.5-25 mcg inhaler Inhale 1 puff into the lungs once daily. 10/6/21   Ari Weinberg MD   hydrocortisone (ANUSOL-HC) 25 mg suppository INSERT ONE SUPPOSITORY RECTALLY AS NEEDED FOR HEMORRHOIDS 5/22/24   Fish Courtney MD   ondansetron (ZOFRAN-ODT) 8 MG TbDL Take 1 tablet (8 mg total) by mouth every 8 (eight) hours. PRN nausea 8/8/23   Cici Hernandez NP   sucralfate (CARAFATE) 1 gram tablet Take 1 g by mouth 4 (four) times daily.     "Provider, Historical        Physical Exam   Vital Signs  Temp:  [99.6 °F (37.6 °C)-103.3 °F (39.6 °C)]   Pulse:  []   Resp:  [13-24]   BP: (127-150)/(74-88)   SpO2:  [88 %-98 %]    General:  Pleasant female sitting up in bed an evident distress and with severe chills.  Well-developed and well-nourished.  HEENT: NC/AT  Neck:  Supple. No JVD  Chest: Clear bilaterally, no wheezing, no accessory muscle use.  CVS:  Tachycardic rate, Regular rhythm. Normal S1/S2.  Abdomen:  Right upper quadrant and right lower quadrant lateral tenderness with right flank tenderness and CVA tenderness nondistended, normoactive BS, soft.  MSK: No obvious deformity or joint swelling  Skin: Warm and dry  Neuro: AAOx3, no focal neurological deficit  Psych: Cooperative      Labs     Recent Labs     06/25/24  1522   WBC 10.19   RBC 4.45   HGB 13.5   HCT 40.4   MCV 90.8   MCH 30.3   MCHC 33.4   RDW 13.3        No results for input(s): "PROTIME", "INR", "PTT", "D-DIMER", "FERRITIN", "IRON", "TRANS", "TIBC", "LABIRON", "AMZCBSDL32", "FOLATE", "LDH", "HAPTOGLOBIN", "RETICCNTAUTO", "RETABS", "PERIPSMEAREV" in the last 72 hours.   Recent Labs     06/25/24  1522   *   K 4.1   CO2 23   BUN 9.6*   CREATININE 1.16*   EGFRNORACEVR 53   GLUCOSE 107   CALCIUM 9.6   ALBUMIN 3.3*   GLOBULIN 4.1*   ALKPHOS 80   ALT 12   AST 15   BILITOT 0.9     No results for input(s): "LACTIC" in the last 72 hours.  No results for input(s): "CPK", "TROPONINI" in the last 72 hours.     Microbiology Results (last 7 days)       Procedure Component Value Units Date/Time    Blood culture #2 **CANNOT BE ORDERED STAT** [[id number]] Collected: 06/25/24 1811    Order Status: Resulted Specimen: Blood Updated: 06/25/24 1859    Blood culture #1 **CANNOT BE ORDERED STAT** [[id number]] Collected: 06/25/24 1811    Order Status: Resulted Specimen: Blood Updated: 06/25/24 1825    Urine culture [[id number]] Collected: 06/25/24 1423    Order Status: Sent Specimen: Urine " Updated: 06/25/24 1536           Imaging     CT Abdomen Pelvis With IV Contrast NO Oral Contrast   Final Result      Findings concerning for right-sided urinary infection and pyelonephritis with mild inflammatory changes.  No drainable fluid collection.         Electronically signed by: Deirdre Farmer   Date:    06/25/2024   Time:    16:55        Assessment & Plan   1. UTI/left pyelonephritis, present on arrival   2. Sepsis secondary to above   3. Acute kidney injury in setting of unilateral kidney  -unilateral kidney (right) secondary to donating the left kidney presents with right flank and right-sided abdominal pain as well as fever, chills, body aches.  -febrile, tachycardic.  workup reveals evidence UTI with hematuria and lactic acidosis, but no leukocytosis.  -patient is treated with 2 L IV fluids, 1 g IV Rocephin Q 24 hours, symptomatic management.  -repeat lactic acid every 4 hours, blood culture pending, urine culture pending, CBC/CMP in the morning, monitor closely with telemetry.  -continue Rocephin, IV fluids, Zofran, pain control, antipyretics    Other history:  HTN, asthma, GERD, generalized anxiety disorder    VTE Prophylaxis: SCDs, Lovenox      I, Ravindra Brown PA-C have reviewed and discussed this case with Dr. Brayan Sellers. Please see addendum for further assessment and plan from attending MD.      _____________________________________________________________  I, Dr. Brayan Sellers assumed care of this patient.  For the patient encounter, I performed the substantive portion of the visit, I reviewed the NPPA documentation, treatment plan, and medical decision making.  I had face to face time with this patient.  I have personally reviewed the labs and test results that are presently available. I have reviewed or attempted to review medical records based upon their availability. If patient was admitted under observational status it is with my approval.      Pleasant female with single  kidney secondary to donating her other kidney, presents with flank pain and found to have left pyelonephritis.  On exam she was left CVA tenderness and mild flank tenderness to palpation in the left, lungs clear anteriorly, cardiovascular regular rate and rhythm and agree with above.  Urine and blood cultures obtained and continue broad-spectrum antibiotics and IV fluids.    Time seen:  10:00 p.m. 6/25  Critical care time: 35 min; Critical care diagnosis:  Sepsis  Brayan Sellers MD

## 2024-06-26 NOTE — PLAN OF CARE
06/26/24 1623   Discharge Assessment   Assessment Type Discharge Planning Assessment   Confirmed/corrected address, phone number and insurance Yes   Confirmed Demographics Correct on Facesheet   Source of Information patient   When was your last doctors appointment?   (april-may 2024)   Communicated PERRI with patient/caregiver Date not available/Unable to determine   Reason For Admission BCBS   People in Home friend(s)   Do you expect to return to your current living situation? Yes   Do you have help at home or someone to help you manage your care at home? No   Prior to hospitilization cognitive status: Alert/Oriented   Current cognitive status: Alert/Oriented   Walking or Climbing Stairs Difficulty no   Dressing/Bathing Difficulty no   Equipment Currently Used at Home blood pressure machine;glucometer;CPAP  (cpap-viemed)   Readmission within 30 days? No   Patient currently being followed by outpatient case management? No   Do you currently have service(s) that help you manage your care at home? No   Do you take prescription medications? Yes   Do you have prescription coverage? Yes   Coverage bcbs   Do you have any problems affording any of your prescribed medications? No   Is the patient taking medications as prescribed? yes   Who is going to help you get home at discharge? Ender Arrooy (Roommate)  [phone]   How do you get to doctors appointments? car, drives self   Are you on dialysis? No   Do you take coumadin? No   Discharge Plan A Home   Discharge Plan B Home   DME Needed Upon Discharge  none   Discharge Plan discussed with: Patient   Transition of Care Barriers None   Physical Activity   On average, how many days per week do you engage in moderate to strenuous exercise (like a brisk walk)? 3 days   On average, how many minutes do you engage in exercise at this level? 120 min   Financial Resource Strain   How hard is it for you to pay for the very basics like food, housing, medical care, and heating?  Somewhat   Housing Stability   In the last 12 months, was there a time when you were not able to pay the mortgage or rent on time? N   At any time in the past 12 months, were you homeless or living in a shelter (including now)? N   Transportation Needs   Has the lack of transportation kept you from medical appointments, meetings, work or from getting things needed for daily living? No   Food Insecurity   Within the past 12 months, you worried that your food would run out before you got the money to buy more. Never true   Within the past 12 months, the food you bought just didn't last and you didn't have money to get more. Never true   Stress   Do you feel stress - tense, restless, nervous, or anxious, or unable to sleep at night because your mind is troubled all the time - these days? Not at all   Social Isolation   How often do you feel lonely or isolated from those around you?  Never   Alcohol Use   Q1: How often do you have a drink containing alcohol? 2-4 pr month   Q2: How many drinks containing alcohol do you have on a typical day when you are drinking? 1 or 2   Q3: How often do you have six or more drinks on one occasion? Never   Utilities   In the past 12 months has the electric, gas, oil, or water company threatened to shut off services in your home? No   Health Literacy   How often do you need to have someone help you when you read instructions, pamphlets, or other written material from your doctor or pharmacy? Never   OTHER   Name(s) of People in Home Ender Arroyo (Roommate)  [phone]

## 2024-06-26 NOTE — PROGRESS NOTES
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  Patient reported no known inciting incident, trauma to the abdomen/flank.  Patient reports that the pain feels constant, worsening, 10/10, nonradiating, and described as sharp/stabbing.  Patient originally went to urgent care and after her vitals were taken and seeing that she had a fever of 103 and a blood pressure of 150/86 she was sent to the ED. Patient denies any exacerbating or alleviating factors, treatments tried at home, history of similar episodes, dysuria, hematuria, chest pain, shortness of breath, AMS/LOC.       ED course:  Vital signs significant for temp max of 103.3, , respirations 24.  Episodes of mild hypertension but otherwise normotensive.  CBC insignificant.  BMP shows a BUN of 9.6 and a creatinine of 1.16.  Lactic acid 4.1.  UA shows evident UTI hematuria.  CT abdomen/pelvis with IV contrast shows a right-sided UTI and pyelonephritis with mild inflammatory changes but with no drainable fluid collection.  Since arrival fever initially went down to 99.6 in the most recently spiked back up to 103.3.  ED treated the patient with 2 L of LR bolus, IV Zofran, IV Dilaudid, Tylenol, and Rocephin 1 g IV every 24 hours.      admited the patient for Sepsis secondary to pyelonephritis.    Interval Hx:   Fever improving.  She was alert, comfortably resting.  Doing well on room air.  Complains of right flank pain.  Sister at bedside.  She denies any vomiting but reports nausea.      Labs showing stable hemoglobin and platelets.  LUIS has improved  since admission.  Lactic acidosis has resolved with IV hydration.  Urine sample showing Gram-negative rods and blood cultures are currently in the lab.      Objective/physical exam:  Vitals:    06/26/24 0421 06/26/24 0430 06/26/24 0722 06/26/24 0844   BP:  114/72 128/78    Pulse:  70 77 77   Resp: 18 18 20 20   Temp:  98.7 °F (37.1 °C) 99 °F (37.2 °C)    TempSrc:  Oral Oral    SpO2:  (!) 92% (!) 93%    Weight:       Height:         General: In no acute distress, afebrile  Respiratory: Clear to auscultation bilaterally  Cardiovascular: S1, S2, no appreciable murmur  Abdomen: Soft, nontender, BS +, right flank tenderness  Neurologic: Alert and oriented x4, moving all extremities with good strength     Lab Results   Component Value Date     (L) 06/25/2024    K 4.1 06/25/2024     06/25/2024    CO2 20 (L) 06/25/2024    BUN 9.3 (L) 06/25/2024    CREATININE 0.95 06/25/2024    CALCIUM 8.3 (L) 06/25/2024    ANIONGAP 7 (L) 02/23/2022    ESTGFRAFRICA 74 02/23/2022    EGFRNONAA >60 09/09/2021      Lab Results   Component Value Date    ALT 30 06/25/2024    AST 63 (H) 06/25/2024    GGT 12.0 03/12/2018    ALKPHOS 91 06/25/2024    BILITOT 0.8 06/25/2024      Lab Results   Component Value Date    WBC 7.20 06/26/2024    HGB 11.4 (L) 06/26/2024    HCT 35.5 (L) 06/26/2024    MCV 93.4 06/26/2024     06/26/2024           Medications:   cefTRIAXone (Rocephin) IV (PEDS and ADULTS)  1 g Intravenous Q24H    diltiaZEM  180 mg Oral BID    fluticasone furoate-vilanteroL  1 puff Inhalation Daily    And    umeclidinium  1 capsule Inhalation Daily    linaCLOtide  145 mcg Oral Before breakfast    pramipexole  0.125 mg Oral TID        Current Facility-Administered Medications:     acetaminophen, 650 mg, Oral, Q4H PRN    albuterol-ipratropium, 3 mL, Nebulization, Q4H PRN    aluminum-magnesium hydroxide-simethicone, 30 mL, Oral, QID PRN    dextrose 10%, 12.5 g, Intravenous, PRN    dextrose 10%, 25 g, Intravenous, PRN    glucagon  (human recombinant), 1 mg, Intramuscular, PRN    glucose, 16 g, Oral, PRN    glucose, 24 g, Oral, PRN    melatonin, 6 mg, Oral, Nightly PRN    naloxone, 0.02 mg, Intravenous, PRN    ondansetron, 4 mg, Intravenous, Q6H PRN    oxyCODONE, 5 mg, Oral, Q6H PRN    polyethylene glycol, 17 g, Oral, BID PRN    prochlorperazine, 5 mg, Intravenous, Q6H PRN    senna-docusate 8.6-50 mg, 1 tablet, Oral, BID PRN    sodium chloride 0.9%, 10 mL, Intravenous, Q12H PRN     Assessment/Plan:    Sepsis secondary to right sided pyelonephritis  Lactic acidosis at admission-resolved  Mild LUIS at admission-improving  Solitary right kidney, history of left kidney donation    HX:  HTN, asthma, GERD, anxiety disorder     Plan:   -follow cultures until finalized.  Continue ceftriaxone.  Reduce IV hydration 100 cc/hour until today.  Encourage p.o. intake   -Analgesics, antiemetics as needed  -other home medications will be reviewed and renewed     SCDs, ambulatory    Critical care diagnosis:  Sepsis, pyelonephritis  Critical care time: 50 minutes        Jerald Schreiber MD

## 2024-06-26 NOTE — NURSING
Nurses Note -- 4 Eyes      6/26/2024   1:18 AM      Skin assessed during: Admit      [x] No Altered Skin Integrity Present    []Prevention Measures Documented      [] Yes- Altered Skin Integrity Present or Discovered   [] LDA Added if Not in Epic (Describe Wound)   [] New Altered Skin Integrity was Present on Admit and Documented in LDA   [] Wound Image Taken    Wound Care Consulted? No    Attending Nurse:  DON Malin    Second RN/Staff Member:   Allyson Uribe LPN

## 2024-06-27 LAB
ALBUMIN SERPL-MCNC: 2.5 G/DL (ref 3.4–4.8)
ALBUMIN/GLOB SERPL: 0.8 RATIO (ref 1.1–2)
ALP SERPL-CCNC: 75 UNIT/L (ref 40–150)
ALT SERPL-CCNC: 16 UNIT/L (ref 0–55)
ANION GAP SERPL CALC-SCNC: 10 MEQ/L
AST SERPL-CCNC: 16 UNIT/L (ref 5–34)
BACTERIA UR CULT: ABNORMAL
BASOPHILS # BLD AUTO: 0.03 X10(3)/MCL
BASOPHILS NFR BLD AUTO: 0.6 %
BILIRUB SERPL-MCNC: 0.3 MG/DL
BUN SERPL-MCNC: 11.5 MG/DL (ref 9.8–20.1)
CALCIUM SERPL-MCNC: 8.2 MG/DL (ref 8.4–10.2)
CHLORIDE SERPL-SCNC: 108 MMOL/L (ref 98–107)
CO2 SERPL-SCNC: 21 MMOL/L (ref 23–31)
CREAT SERPL-MCNC: 1.09 MG/DL (ref 0.55–1.02)
CREAT/UREA NIT SERPL: 11
EOSINOPHIL # BLD AUTO: 0.06 X10(3)/MCL (ref 0–0.9)
EOSINOPHIL NFR BLD AUTO: 1.2 %
ERYTHROCYTE [DISTWIDTH] IN BLOOD BY AUTOMATED COUNT: 13.6 % (ref 11.5–17)
GFR SERPLBLD CREATININE-BSD FMLA CKD-EPI: 58 ML/MIN/1.73/M2
GLOBULIN SER-MCNC: 3.2 GM/DL (ref 2.4–3.5)
GLUCOSE SERPL-MCNC: 98 MG/DL (ref 82–115)
HCT VFR BLD AUTO: 33.6 % (ref 37–47)
HGB BLD-MCNC: 11.1 G/DL (ref 12–16)
IMM GRANULOCYTES # BLD AUTO: 0.02 X10(3)/MCL (ref 0–0.04)
IMM GRANULOCYTES NFR BLD AUTO: 0.4 %
LYMPHOCYTES # BLD AUTO: 0.94 X10(3)/MCL (ref 0.6–4.6)
LYMPHOCYTES NFR BLD AUTO: 18.6 %
MCH RBC QN AUTO: 30.6 PG (ref 27–31)
MCHC RBC AUTO-ENTMCNC: 33 G/DL (ref 33–36)
MCV RBC AUTO: 92.6 FL (ref 80–94)
MONOCYTES # BLD AUTO: 0.83 X10(3)/MCL (ref 0.1–1.3)
MONOCYTES NFR BLD AUTO: 16.4 %
NEUTROPHILS # BLD AUTO: 3.17 X10(3)/MCL (ref 2.1–9.2)
NEUTROPHILS NFR BLD AUTO: 62.8 %
NRBC BLD AUTO-RTO: 0 %
PLATELET # BLD AUTO: 163 X10(3)/MCL (ref 130–400)
PMV BLD AUTO: 10.5 FL (ref 7.4–10.4)
POTASSIUM SERPL-SCNC: 3.8 MMOL/L (ref 3.5–5.1)
PROT SERPL-MCNC: 5.7 GM/DL (ref 5.8–7.6)
RBC # BLD AUTO: 3.63 X10(6)/MCL (ref 4.2–5.4)
SODIUM SERPL-SCNC: 139 MMOL/L (ref 136–145)
WBC # BLD AUTO: 5.05 X10(3)/MCL (ref 4.5–11.5)

## 2024-06-27 PROCEDURE — 25000003 PHARM REV CODE 250: Performed by: INTERNAL MEDICINE

## 2024-06-27 PROCEDURE — 25000003 PHARM REV CODE 250: Performed by: STUDENT IN AN ORGANIZED HEALTH CARE EDUCATION/TRAINING PROGRAM

## 2024-06-27 PROCEDURE — 25000242 PHARM REV CODE 250 ALT 637 W/ HCPCS: Performed by: STUDENT IN AN ORGANIZED HEALTH CARE EDUCATION/TRAINING PROGRAM

## 2024-06-27 PROCEDURE — 36415 COLL VENOUS BLD VENIPUNCTURE: CPT

## 2024-06-27 PROCEDURE — 80053 COMPREHEN METABOLIC PANEL: CPT

## 2024-06-27 PROCEDURE — 85025 COMPLETE CBC W/AUTO DIFF WBC: CPT

## 2024-06-27 PROCEDURE — 63600175 PHARM REV CODE 636 W HCPCS: Performed by: INTERNAL MEDICINE

## 2024-06-27 PROCEDURE — 94760 N-INVAS EAR/PLS OXIMETRY 1: CPT

## 2024-06-27 PROCEDURE — 25000003 PHARM REV CODE 250

## 2024-06-27 PROCEDURE — 21400001 HC TELEMETRY ROOM

## 2024-06-27 PROCEDURE — 11000001 HC ACUTE MED/SURG PRIVATE ROOM

## 2024-06-27 PROCEDURE — 27000221 HC OXYGEN, UP TO 24 HOURS

## 2024-06-27 RX ORDER — ENOXAPARIN SODIUM 100 MG/ML
40 INJECTION SUBCUTANEOUS EVERY 24 HOURS
Status: DISCONTINUED | OUTPATIENT
Start: 2024-06-27 | End: 2024-07-01 | Stop reason: HOSPADM

## 2024-06-27 RX ADMIN — DILTIAZEM HYDROCHLORIDE 180 MG: 180 CAPSULE, COATED, EXTENDED RELEASE ORAL at 08:06

## 2024-06-27 RX ADMIN — OXYCODONE HYDROCHLORIDE 5 MG: 5 TABLET ORAL at 04:06

## 2024-06-27 RX ADMIN — ENOXAPARIN SODIUM 40 MG: 40 INJECTION SUBCUTANEOUS at 04:06

## 2024-06-27 RX ADMIN — PRAMIPEXOLE DIHYDROCHLORIDE 0.12 MG: 0.12 TABLET ORAL at 08:06

## 2024-06-27 RX ADMIN — OXYCODONE HYDROCHLORIDE 5 MG: 5 TABLET ORAL at 11:06

## 2024-06-27 RX ADMIN — ACETAMINOPHEN 650 MG: 325 TABLET, FILM COATED ORAL at 04:06

## 2024-06-27 RX ADMIN — FLUTICASONE FUROATE AND VILANTEROL TRIFENATATE 1 PUFF: 200; 25 POWDER RESPIRATORY (INHALATION) at 08:06

## 2024-06-27 RX ADMIN — UMECLIDINIUM 62.5 MCG: 62.5 AEROSOL, POWDER ORAL at 10:06

## 2024-06-27 RX ADMIN — MEROPENEM 1 G: 1 INJECTION, POWDER, FOR SOLUTION INTRAVENOUS at 02:06

## 2024-06-27 RX ADMIN — OXYCODONE HYDROCHLORIDE 5 MG: 5 TABLET ORAL at 10:06

## 2024-06-27 RX ADMIN — PRAMIPEXOLE DIHYDROCHLORIDE 0.12 MG: 0.12 TABLET ORAL at 02:06

## 2024-06-27 RX ADMIN — LINACLOTIDE 145 MCG: 145 CAPSULE, GELATIN COATED ORAL at 04:06

## 2024-06-27 NOTE — PROGRESS NOTES
Pharmacist Renal Dose Adjustment Note    Thea Major is a 64 y.o. female being treated with the medication meropenem    Patient Data:    Vital Signs (Most Recent):  Temp: 97.9 °F (36.6 °C) (06/27/24 1152)  Pulse: 65 (06/27/24 1152)  Resp: 18 (06/27/24 1022)  BP: 113/75 (06/27/24 1152)  SpO2: (!) 93 % (06/27/24 1152) Vital Signs (72h Range):  Temp:  [97.9 °F (36.6 °C)-103.3 °F (39.6 °C)]   Pulse:  []   Resp:  [13-24]   BP: ()/(38-95)   SpO2:  [88 %-98 %]      Recent Labs   Lab 06/25/24  1522 06/25/24  2040 06/27/24  0415   CREATININE 1.16* 0.95 1.09*     Serum creatinine: 1.09 mg/dL (H) 06/27/24 0415  Estimated creatinine clearance: 49.9 mL/min (A)    Meropenem 1 g q8h will be changed to 1 g q12h    Pharmacist's Name: Dina Tuttle  Pharmacist's Extension: [phone]

## 2024-06-27 NOTE — PROGRESS NOTES
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  Patient reported no known inciting incident, trauma to the abdomen/flank.  Patient reports that the pain feels constant, worsening, 10/10, nonradiating, and described as sharp/stabbing.  Patient originally went to urgent care and after her vitals were taken and seeing that she had a fever of 103 and a blood pressure of 150/86 she was sent to the ED. Patient denies any exacerbating or alleviating factors, treatments tried at home, history of similar episodes, dysuria, hematuria, chest pain, shortness of breath, AMS/LOC.       ED course:  Vital signs significant for temp max of 103.3, , respirations 24.  Episodes of mild hypertension but otherwise normotensive.  CBC insignificant.  BMP shows a BUN of 9.6 and a creatinine of 1.16.  Lactic acid 4.1.  UA shows evident UTI hematuria.  CT abdomen/pelvis with IV contrast shows a right-sided UTI and pyelonephritis with mild inflammatory changes but with no drainable fluid collection.  Since arrival fever initially went down to 99.6 in the most recently spiked back up to 103.3.  ED treated the patient with 2 L of LR bolus, IV Zofran, IV Dilaudid, Tylenol, and Rocephin 1 g IV every 24 hours.      admited the patient for Sepsis secondary to pyelonephritis.      Interval Hx:   No acute events reported overnight.    Seen at bedside this morning, patient was resting on bed reported right-sided flank pain denied nausea vomiting reported fever.  Poor appetite.  Encourage p.o. intake discussed ongoing care, adjusting  antibiotics based on cultures, patient verbalized understanding    Objective/physical exam:  General: In no acute distress, afebrile  Chest:  Unlabored breathing on room air  Heart:  Normal rate  Abdomen: Soft, nontender  : Right-sided flank tenderness + to palpation  MSK: Warm, no lower extremity edema  Neurologic: Alert and oriented   VITAL SIGNS: 24 HRS MIN & MAX LAST   Temp  Min: 97.9 °F (36.6 °C)  Max: 100.8 °F (38.2 °C) 97.9 °F (36.6 °C)   BP  Min: 98/63  Max: 119/72 113/75   Pulse  Min: 60  Max: 105  65   Resp  Min: 16  Max: 18 18   SpO2  Min: 91 %  Max: 94 % (!) 93 %     I have reviewed the following labs:  Recent Labs   Lab 06/25/24  1522 06/26/24 0352 06/27/24 0415   WBC 10.19 7.20 5.05   RBC 4.45 3.80* 3.63*   HGB 13.5 11.4* 11.1*   HCT 40.4 35.5* 33.6*   MCV 90.8 93.4 92.6   MCH 30.3 30.0 30.6   MCHC 33.4 32.1* 33.0   RDW 13.3 13.7 13.6    181 163   MPV 10.1 9.8 10.5*     Recent Labs   Lab 06/25/24  1522 06/25/24  2040 06/27/24 0415   * 135* 139   K 4.1 4.1 3.8    105 108*   CO2 23 20* 21*   BUN 9.6* 9.3* 11.5   CREATININE 1.16* 0.95 1.09*   CALCIUM 9.6 8.3* 8.2*   ALBUMIN 3.3* 2.9* 2.5*   ALKPHOS 80 91 75   ALT 12 30 16   AST 15 63* 16   BILITOT 0.9 0.8 0.3     Microbiology Results (last 7 days)       Procedure Component Value Units Date/Time    Urine culture [[id number]]  (Abnormal)  (Susceptibility) Collected: 06/25/24 1423    Order Status: Completed Specimen: Urine Updated: 06/27/24 0852     Urine Culture >/= 100,000 colonies/ml Escherichia coli ESBL    Blood culture #2 **CANNOT BE ORDERED STAT** [[id number]]  (Normal) Collected: 06/25/24 1811    Order Status: Completed Specimen: Blood Updated: 06/26/24 2001     Blood Culture No Growth At 24 Hours    Blood culture #1 **CANNOT BE ORDERED STAT** [[id number]]  (Normal) Collected: 06/25/24 1811    Order Status: Completed Specimen: Blood Updated: 06/26/24 1900     Blood Culture No Growth At 24 Hours             See below for  Radiology    Scheduled Med:   diltiaZEM  180 mg Oral BID    fluticasone furoate-vilanteroL  1 puff Inhalation Daily    And    umeclidinium  1 capsule Inhalation Daily    linaCLOtide  145 mcg Oral Before breakfast    meropenem IV (PEDS and ADULTS)  1 g Intravenous Q12H    pramipexole  0.125 mg Oral TID      Continuous Infusions:     PRN Meds:    Current Facility-Administered Medications:     acetaminophen, 650 mg, Oral, Q4H PRN    albuterol-ipratropium, 3 mL, Nebulization, Q4H PRN    aluminum-magnesium hydroxide-simethicone, 30 mL, Oral, QID PRN    dextrose 10%, 12.5 g, Intravenous, PRN    dextrose 10%, 25 g, Intravenous, PRN    glucagon (human recombinant), 1 mg, Intramuscular, PRN    glucose, 16 g, Oral, PRN    glucose, 24 g, Oral, PRN    melatonin, 6 mg, Oral, Nightly PRN    naloxone, 0.02 mg, Intravenous, PRN    ondansetron, 4 mg, Intravenous, Q6H PRN    oxyCODONE, 5 mg, Oral, Q6H PRN    polyethylene glycol, 17 g, Oral, BID PRN    prochlorperazine, 5 mg, Intravenous, Q6H PRN    senna-docusate 8.6-50 mg, 1 tablet, Oral, BID PRN    sodium chloride 0.9%, 10 mL, Intravenous, Q12H PRN     Assessment/Plan:  Sepsis(fever tachycardia) secondary to right sided pyelonephritis-ESBL E coli       HX:  HTN, asthma, GERD, anxiety disorder ,Solitary right kidney, history of left kidney donation      Patient with a T-max 100.8°, micro noted, urine culture growing greater than 100,000 colonies ESBL E coli CT on admission concerning for right-sided pyelonephritis  DC ceftriaxone, start patient on meropenem IV 1 g q.12 adjusted to renal function  Monitor  fever curve symptom improvement  Supportive care with analgesics, antiemetics  Labs from this morning noted BUN 11.5, creatinine 1.29, albumin 2.5, hemoglobin 11.1 care discussed with patient's nurse, case management    VTE prophylaxis:  Lovenox    Patient condition:  Fair    Anticipated discharge and Disposition:    TBD      _____________________________________________________________________    Nutrition Status:    Radiology:  I have personally reviewed the following imaging and agree with the radiologist.     CT Abdomen Pelvis With IV Contrast NO Oral Contrast  Narrative: EXAMINATION:  CT ABDOMEN PELVIS WITH IV CONTRAST    CLINICAL HISTORY:  RLQ abdominal pain (Age >= 14y);    TECHNIQUE:  CT imaging was performed of the abdomen and pelvis after the administration of intravenous contrast. Dose length product is 334 mGycm. Automatic exposure control, adjustment of mA/kV or iterative reconstruction technique was used to limit radiation dose.    COMPARISON:  CTA abdomen pelvis dated 06/27/2018    FINDINGS:  Liver: Normal.    Gallbladder and biliary tree: The gallbladder has been surgically resected.  Mild biliary dilatation is likely postoperative.    Pancreas: Normal.    Spleen: Normal.    Adrenals: Normal.    Kidneys and ureters: The left kidney has been surgically resected.  There is mild dilatation of the right renal pelvis with diffuse urothelial enhancement in the renal pelvis and ureter.  There is also heterogeneous cortical enhancement of the right kidney with mild surrounding inflammatory changes.    Bladder: Normal.    Reproductive organs: No pelvic masses.    Stomach/bowel: No evidence of bowel obstruction. Appendix is normal. No discernible bowel inflammation.    Lymph nodes: No pathologically enlarged lymph node identified.    Peritoneum: No ascites or free air. No fluid collection.    Vessels: Moderate vascular calcifications.    Abdominal wall: Normal.    Lung bases: Emphysematous changes in the lung bases.    Bones: No acute osseous findings.  Impression: Findings concerning for right-sided urinary infection and pyelonephritis with mild inflammatory changes.  No drainable fluid collection.    Electronically signed by: Deirdre Farmer  Date:    06/25/2024  Time:    16:55      Madelyn Crews MD  Department of  [facility] Medicine   [facility]   06/27/2024

## 2024-06-28 LAB
ALBUMIN SERPL-MCNC: 2.6 G/DL (ref 3.4–4.8)
ALBUMIN/GLOB SERPL: 0.9 RATIO (ref 1.1–2)
ALP SERPL-CCNC: 79 UNIT/L (ref 40–150)
ALT SERPL-CCNC: 18 UNIT/L (ref 0–55)
ANION GAP SERPL CALC-SCNC: 8 MEQ/L
AST SERPL-CCNC: 19 UNIT/L (ref 5–34)
BASOPHILS # BLD AUTO: 0.02 X10(3)/MCL
BASOPHILS NFR BLD AUTO: 0.5 %
BILIRUB SERPL-MCNC: 0.3 MG/DL
BUN SERPL-MCNC: 7.8 MG/DL (ref 9.8–20.1)
CALCIUM SERPL-MCNC: 8.3 MG/DL (ref 8.4–10.2)
CHLORIDE SERPL-SCNC: 108 MMOL/L (ref 98–107)
CO2 SERPL-SCNC: 26 MMOL/L (ref 23–31)
CREAT SERPL-MCNC: 0.92 MG/DL (ref 0.55–1.02)
CREAT/UREA NIT SERPL: 8
EOSINOPHIL # BLD AUTO: 0.1 X10(3)/MCL (ref 0–0.9)
EOSINOPHIL NFR BLD AUTO: 2.4 %
ERYTHROCYTE [DISTWIDTH] IN BLOOD BY AUTOMATED COUNT: 13.4 % (ref 11.5–17)
GFR SERPLBLD CREATININE-BSD FMLA CKD-EPI: >60 ML/MIN/1.73/M2
GLOBULIN SER-MCNC: 2.8 GM/DL (ref 2.4–3.5)
GLUCOSE SERPL-MCNC: 88 MG/DL (ref 82–115)
HCT VFR BLD AUTO: 33.8 % (ref 37–47)
HGB BLD-MCNC: 10.8 G/DL (ref 12–16)
IMM GRANULOCYTES # BLD AUTO: 0.02 X10(3)/MCL (ref 0–0.04)
IMM GRANULOCYTES NFR BLD AUTO: 0.5 %
LYMPHOCYTES # BLD AUTO: 0.95 X10(3)/MCL (ref 0.6–4.6)
LYMPHOCYTES NFR BLD AUTO: 23.1 %
MCH RBC QN AUTO: 29.8 PG (ref 27–31)
MCHC RBC AUTO-ENTMCNC: 32 G/DL (ref 33–36)
MCV RBC AUTO: 93.1 FL (ref 80–94)
MONOCYTES # BLD AUTO: 0.67 X10(3)/MCL (ref 0.1–1.3)
MONOCYTES NFR BLD AUTO: 16.3 %
NEUTROPHILS # BLD AUTO: 2.36 X10(3)/MCL (ref 2.1–9.2)
NEUTROPHILS NFR BLD AUTO: 57.2 %
NRBC BLD AUTO-RTO: 0 %
PLATELET # BLD AUTO: 201 X10(3)/MCL (ref 130–400)
PMV BLD AUTO: 10.3 FL (ref 7.4–10.4)
POTASSIUM SERPL-SCNC: 4.6 MMOL/L (ref 3.5–5.1)
PROT SERPL-MCNC: 5.4 GM/DL (ref 5.8–7.6)
RBC # BLD AUTO: 3.63 X10(6)/MCL (ref 4.2–5.4)
SODIUM SERPL-SCNC: 142 MMOL/L (ref 136–145)
WBC # BLD AUTO: 4.12 X10(3)/MCL (ref 4.5–11.5)

## 2024-06-28 PROCEDURE — 25000003 PHARM REV CODE 250: Performed by: INTERNAL MEDICINE

## 2024-06-28 PROCEDURE — 21400001 HC TELEMETRY ROOM

## 2024-06-28 PROCEDURE — 63600175 PHARM REV CODE 636 W HCPCS: Performed by: INTERNAL MEDICINE

## 2024-06-28 PROCEDURE — 25000003 PHARM REV CODE 250

## 2024-06-28 PROCEDURE — 25000242 PHARM REV CODE 250 ALT 637 W/ HCPCS: Performed by: STUDENT IN AN ORGANIZED HEALTH CARE EDUCATION/TRAINING PROGRAM

## 2024-06-28 PROCEDURE — 25000003 PHARM REV CODE 250: Performed by: STUDENT IN AN ORGANIZED HEALTH CARE EDUCATION/TRAINING PROGRAM

## 2024-06-28 PROCEDURE — 36415 COLL VENOUS BLD VENIPUNCTURE: CPT

## 2024-06-28 PROCEDURE — 80053 COMPREHEN METABOLIC PANEL: CPT

## 2024-06-28 PROCEDURE — 85025 COMPLETE CBC W/AUTO DIFF WBC: CPT

## 2024-06-28 RX ADMIN — OXYCODONE HYDROCHLORIDE 5 MG: 5 TABLET ORAL at 12:06

## 2024-06-28 RX ADMIN — DILTIAZEM HYDROCHLORIDE 180 MG: 180 CAPSULE, COATED, EXTENDED RELEASE ORAL at 08:06

## 2024-06-28 RX ADMIN — FLUTICASONE FUROATE AND VILANTEROL TRIFENATATE 1 PUFF: 200; 25 POWDER RESPIRATORY (INHALATION) at 08:06

## 2024-06-28 RX ADMIN — PRAMIPEXOLE DIHYDROCHLORIDE 0.12 MG: 0.12 TABLET ORAL at 02:06

## 2024-06-28 RX ADMIN — UMECLIDINIUM 62.5 MCG: 62.5 AEROSOL, POWDER ORAL at 08:06

## 2024-06-28 RX ADMIN — PRAMIPEXOLE DIHYDROCHLORIDE 0.12 MG: 0.12 TABLET ORAL at 08:06

## 2024-06-28 RX ADMIN — LINACLOTIDE 145 MCG: 145 CAPSULE, GELATIN COATED ORAL at 05:06

## 2024-06-28 RX ADMIN — MEROPENEM 1 G: 1 INJECTION, POWDER, FOR SOLUTION INTRAVENOUS at 02:06

## 2024-06-28 RX ADMIN — ENOXAPARIN SODIUM 40 MG: 40 INJECTION SUBCUTANEOUS at 04:06

## 2024-06-28 RX ADMIN — OXYCODONE HYDROCHLORIDE 5 MG: 5 TABLET ORAL at 06:06

## 2024-06-28 RX ADMIN — SENNOSIDES AND DOCUSATE SODIUM 1 TABLET: 50; 8.6 TABLET ORAL at 06:06

## 2024-06-28 RX ADMIN — MEROPENEM 1 G: 1 INJECTION, POWDER, FOR SOLUTION INTRAVENOUS at 12:06

## 2024-06-28 RX ADMIN — OXYCODONE HYDROCHLORIDE 5 MG: 5 TABLET ORAL at 05:06

## 2024-06-28 RX ADMIN — SENNOSIDES AND DOCUSATE SODIUM 1 TABLET: 50; 8.6 TABLET ORAL at 08:06

## 2024-06-28 NOTE — PLAN OF CARE
Problem: Infection  Goal: Absence of Infection Signs and Symptoms  Outcome: Progressing     Problem: Adult Inpatient Plan of Care  Goal: Plan of Care Review  Outcome: Progressing  Goal: Patient-Specific Goal (Individualized)  Outcome: Progressing  Goal: Absence of Hospital-Acquired Illness or Injury  Outcome: Progressing  Intervention: Prevent Skin Injury  Flowsheets (Taken 6/28/2024 2201)  Device Skin Pressure Protection: tubing/devices free from skin contact  Goal: Optimal Comfort and Wellbeing  Outcome: Progressing  Goal: Readiness for Transition of Care  Outcome: Progressing

## 2024-06-28 NOTE — PROGRESS NOTES
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  Patient reported no known inciting incident, trauma to the abdomen/flank.  Patient reports that the pain feels constant, worsening, 10/10, nonradiating, and described as sharp/stabbing.  Patient originally went to urgent care and after her vitals were taken and seeing that she had a fever of 103 and a blood pressure of 150/86 she was sent to the ED. Patient denies any exacerbating or alleviating factors, treatments tried at home, history of similar episodes, dysuria, hematuria, chest pain, shortness of breath, AMS/LOC.       ED course:  Vital signs significant for temp max of 103.3, , respirations 24.  Episodes of mild hypertension but otherwise normotensive.  CBC insignificant.  BMP shows a BUN of 9.6 and a creatinine of 1.16.  Lactic acid 4.1.  UA shows evident UTI hematuria.  CT abdomen/pelvis with IV contrast shows a right-sided UTI and pyelonephritis with mild inflammatory changes but with no drainable fluid collection.  Since arrival fever initially went down to 99.6 in the most recently spiked back up to 103.3.  ED treated the patient with 2 L of LR bolus, IV Zofran, IV Dilaudid, Tylenol, and Rocephin 1 g IV every 24 hours.      admited the patient for Sepsis secondary to pyelonephritis.      Interval Hx:   No acute events reported overnight.  Seen at bedside this morning, patient reported improved right flank tenderness denied nausea vomiting.    Objective/physical exam:  General: In no acute distress, afebrile  Chest:  Unlabored breathing on room  air  Heart:  Normal rate  Abdomen: Soft, nontender  MSK: Warm, no lower extremity edema  Neurologic: Alert and oriented   VITAL SIGNS: 24 HRS MIN & MAX LAST   Temp  Min: 98.3 °F (36.8 °C)  Max: 99.7 °F (37.6 °C) 98.3 °F (36.8 °C)   BP  Min: 109/69  Max: 119/71 114/68   Pulse  Min: 73  Max: 91  73   Resp  Min: 18  Max: 24 20   SpO2  Min: 88 %  Max: 94 % (!) 93 %     I have reviewed the following labs:  Recent Labs   Lab 06/26/24  0352 06/27/24  0415 06/28/24  0408   WBC 7.20 5.05 4.12*   RBC 3.80* 3.63* 3.63*   HGB 11.4* 11.1* 10.8*   HCT 35.5* 33.6* 33.8*   MCV 93.4 92.6 93.1   MCH 30.0 30.6 29.8   MCHC 32.1* 33.0 32.0*   RDW 13.7 13.6 13.4    163 201   MPV 9.8 10.5* 10.3     Recent Labs   Lab 06/25/24  2040 06/27/24  0415 06/28/24  0408   * 139 142   K 4.1 3.8 4.6    108* 108*   CO2 20* 21* 26   BUN 9.3* 11.5 7.8*   CREATININE 0.95 1.09* 0.92   CALCIUM 8.3* 8.2* 8.3*   ALBUMIN 2.9* 2.5* 2.6*   ALKPHOS 91 75 79   ALT 30 16 18   AST 63* 16 19   BILITOT 0.8 0.3 0.3     Microbiology Results (last 7 days)       Procedure Component Value Units Date/Time    Blood culture #2 **CANNOT BE ORDERED STAT** [[id number]]  (Normal) Collected: 06/25/24 1811    Order Status: Completed Specimen: Blood Updated: 06/27/24 2000     Blood Culture No Growth At 48 Hours    Blood culture #1 **CANNOT BE ORDERED STAT** [[id number]]  (Normal) Collected: 06/25/24 1811    Order Status: Completed Specimen: Blood Updated: 06/27/24 1900     Blood Culture No Growth At 48 Hours    Urine culture [[id number]]  (Abnormal)  (Susceptibility) Collected: 06/25/24 1423    Order Status: Completed Specimen: Urine Updated: 06/27/24 0814     Urine Culture >/= 100,000 colonies/ml Escherichia coli ESBL             See below for Radiology    Scheduled Med:   diltiaZEM  180 mg Oral BID    enoxparin  40 mg Subcutaneous Q24H (prophylaxis, 1700)    fluticasone furoate-vilanteroL  1 puff Inhalation Daily    And    umeclidinium  1 capsule Inhalation  Daily    linaCLOtide  145 mcg Oral Before breakfast    meropenem IV (PEDS and ADULTS)  1 g Intravenous Q12H    pramipexole  0.125 mg Oral TID      Continuous Infusions:     PRN Meds:    Current Facility-Administered Medications:     acetaminophen, 650 mg, Oral, Q4H PRN    albuterol-ipratropium, 3 mL, Nebulization, Q4H PRN    aluminum-magnesium hydroxide-simethicone, 30 mL, Oral, QID PRN    dextrose 10%, 12.5 g, Intravenous, PRN    dextrose 10%, 25 g, Intravenous, PRN    glucagon (human recombinant), 1 mg, Intramuscular, PRN    glucose, 16 g, Oral, PRN    glucose, 24 g, Oral, PRN    melatonin, 6 mg, Oral, Nightly PRN    naloxone, 0.02 mg, Intravenous, PRN    ondansetron, 4 mg, Intravenous, Q6H PRN    oxyCODONE, 5 mg, Oral, Q6H PRN    polyethylene glycol, 17 g, Oral, BID PRN    prochlorperazine, 5 mg, Intravenous, Q6H PRN    senna-docusate 8.6-50 mg, 1 tablet, Oral, BID PRN    sodium chloride 0.9%, 10 mL, Intravenous, Q12H PRN     Assessment/Plan:  Sepsis(fever tachycardia) secondary to right sided pyelonephritis-ESBL E coli       HX:  HTN, asthma, GERD, anxiety disorder ,Solitary right kidney, history of left kidney donation      Patient with improved fevers after starting meropenem, T-max 99.7°  Continue IV meropenem  Continue to Monitor  fever curve symptom improvement  Supportive care with analgesics, antiemetics  Labs from this morning noted WBC 4.12 K, hemoglobin 10.8 albumin 2.6 BUN to creatinine 7.8/0.92  care discussed with patient's nurse, case management    VTE prophylaxis:  Lovenox    Patient condition:  Fair    Anticipated discharge and Disposition:   TBD      _____________________________________________________________________    Nutrition Status:    Radiology:  I have personally reviewed the following imaging and agree with the radiologist.     CT Abdomen Pelvis With IV Contrast NO Oral Contrast  Narrative: EXAMINATION:  CT ABDOMEN PELVIS WITH IV CONTRAST    CLINICAL HISTORY:  RLQ abdominal pain (Age  >= 14y);    TECHNIQUE:  CT imaging was performed of the abdomen and pelvis after the administration of intravenous contrast. Dose length product is 334 mGycm. Automatic exposure control, adjustment of mA/kV or iterative reconstruction technique was used to limit radiation dose.    COMPARISON:  CTA abdomen pelvis dated 06/27/2018    FINDINGS:  Liver: Normal.    Gallbladder and biliary tree: The gallbladder has been surgically resected.  Mild biliary dilatation is likely postoperative.    Pancreas: Normal.    Spleen: Normal.    Adrenals: Normal.    Kidneys and ureters: The left kidney has been surgically resected.  There is mild dilatation of the right renal pelvis with diffuse urothelial enhancement in the renal pelvis and ureter.  There is also heterogeneous cortical enhancement of the right kidney with mild surrounding inflammatory changes.    Bladder: Normal.    Reproductive organs: No pelvic masses.    Stomach/bowel: No evidence of bowel obstruction. Appendix is normal. No discernible bowel inflammation.    Lymph nodes: No pathologically enlarged lymph node identified.    Peritoneum: No ascites or free air. No fluid collection.    Vessels: Moderate vascular calcifications.    Abdominal wall: Normal.    Lung bases: Emphysematous changes in the lung bases.    Bones: No acute osseous findings.  Impression: Findings concerning for right-sided urinary infection and pyelonephritis with mild inflammatory changes.  No drainable fluid collection.    Electronically signed by: Deirdre Farmer  Date:    06/25/2024  Time:    16:55      Madelyn Crews MD  Department of Hospital Medicine   [facility]   06/28/2024

## 2024-06-29 PROCEDURE — 63600175 PHARM REV CODE 636 W HCPCS: Performed by: INTERNAL MEDICINE

## 2024-06-29 PROCEDURE — 94760 N-INVAS EAR/PLS OXIMETRY 1: CPT

## 2024-06-29 PROCEDURE — 25000003 PHARM REV CODE 250: Performed by: INTERNAL MEDICINE

## 2024-06-29 PROCEDURE — 21400001 HC TELEMETRY ROOM

## 2024-06-29 PROCEDURE — 27000221 HC OXYGEN, UP TO 24 HOURS

## 2024-06-29 PROCEDURE — 25000003 PHARM REV CODE 250: Performed by: STUDENT IN AN ORGANIZED HEALTH CARE EDUCATION/TRAINING PROGRAM

## 2024-06-29 PROCEDURE — 25000003 PHARM REV CODE 250

## 2024-06-29 PROCEDURE — 25000242 PHARM REV CODE 250 ALT 637 W/ HCPCS: Performed by: STUDENT IN AN ORGANIZED HEALTH CARE EDUCATION/TRAINING PROGRAM

## 2024-06-29 RX ADMIN — PRAMIPEXOLE DIHYDROCHLORIDE 0.12 MG: 0.12 TABLET ORAL at 04:06

## 2024-06-29 RX ADMIN — OXYCODONE HYDROCHLORIDE 5 MG: 5 TABLET ORAL at 06:06

## 2024-06-29 RX ADMIN — UMECLIDINIUM 62.5 MCG: 62.5 AEROSOL, POWDER ORAL at 09:06

## 2024-06-29 RX ADMIN — FLUTICASONE FUROATE AND VILANTEROL TRIFENATATE 1 PUFF: 200; 25 POWDER RESPIRATORY (INHALATION) at 09:06

## 2024-06-29 RX ADMIN — OXYCODONE HYDROCHLORIDE 5 MG: 5 TABLET ORAL at 01:06

## 2024-06-29 RX ADMIN — DILTIAZEM HYDROCHLORIDE 180 MG: 180 CAPSULE, COATED, EXTENDED RELEASE ORAL at 09:06

## 2024-06-29 RX ADMIN — MEROPENEM 1 G: 1 INJECTION, POWDER, FOR SOLUTION INTRAVENOUS at 12:06

## 2024-06-29 RX ADMIN — SENNOSIDES AND DOCUSATE SODIUM 1 TABLET: 50; 8.6 TABLET ORAL at 04:06

## 2024-06-29 RX ADMIN — PRAMIPEXOLE DIHYDROCHLORIDE 0.12 MG: 0.12 TABLET ORAL at 09:06

## 2024-06-29 RX ADMIN — ENOXAPARIN SODIUM 40 MG: 40 INJECTION SUBCUTANEOUS at 04:06

## 2024-06-29 RX ADMIN — MEROPENEM 1 G: 1 INJECTION, POWDER, FOR SOLUTION INTRAVENOUS at 01:06

## 2024-06-29 RX ADMIN — OXYCODONE HYDROCHLORIDE 5 MG: 5 TABLET ORAL at 09:06

## 2024-06-29 RX ADMIN — LINACLOTIDE 145 MCG: 145 CAPSULE, GELATIN COATED ORAL at 06:06

## 2024-06-29 NOTE — PLAN OF CARE
Problem: Infection  Goal: Absence of Infection Signs and Symptoms  Outcome: Progressing     Problem: Adult Inpatient Plan of Care  Goal: Absence of Hospital-Acquired Illness or Injury  Outcome: Progressing  Goal: Optimal Comfort and Wellbeing  Outcome: Progressing  Goal: Readiness for Transition of Care  Outcome: Progressing     Problem: UTI (Urinary Tract Infection)  Goal: Improved Infection Symptoms  Outcome: Progressing

## 2024-06-29 NOTE — PLAN OF CARE
Problem: Infection  Goal: Absence of Infection Signs and Symptoms  Outcome: Progressing     Problem: Adult Inpatient Plan of Care  Goal: Plan of Care Review  Outcome: Progressing  Goal: Patient-Specific Goal (Individualized)  Outcome: Progressing  Goal: Absence of Hospital-Acquired Illness or Injury  Outcome: Progressing  Goal: Optimal Comfort and Wellbeing  Outcome: Progressing  Goal: Readiness for Transition of Care  Outcome: Progressing     Problem: UTI (Urinary Tract Infection)  Goal: Improved Infection Symptoms  Outcome: Progressing

## 2024-06-29 NOTE — PROGRESS NOTES
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  Patient reported no known inciting incident, trauma to the abdomen/flank.  Patient reports that the pain feels constant, worsening, 10/10, nonradiating, and described as sharp/stabbing.  Patient originally went to urgent care and after her vitals were taken and seeing that she had a fever of 103 and a blood pressure of 150/86 she was sent to the ED. Patient denies any exacerbating or alleviating factors, treatments tried at home, history of similar episodes, dysuria, hematuria, chest pain, shortness of breath, AMS/LOC.       ED course:  Vital signs significant for temp max of 103.3, , respirations 24.  Episodes of mild hypertension but otherwise normotensive.  CBC insignificant.  BMP shows a BUN of 9.6 and a creatinine of 1.16.  Lactic acid 4.1.  UA shows evident UTI hematuria.  CT abdomen/pelvis with IV contrast shows a right-sided UTI and pyelonephritis with mild inflammatory changes but with no drainable fluid collection.  Since arrival fever initially went down to 99.6 in the most recently spiked back up to 103.3.  ED treated the patient with 2 L of LR bolus, IV Zofran, IV Dilaudid, Tylenol, and Rocephin 1 g IV every 24 hours.      admited the patient for Sepsis secondary to pyelonephritis.      Interval Hx:   No acute events reported overnight.  Seen at bedside this morning, patient reported still having right flank pain though improved. Pt  denied nausea vomiting.   patient concerned about pyelonephritis in the setting of solitary kidney,  assured      Objective/physical exam:  General: In no acute distress, afebrile  Chest:  Unlabored breathing on room air  Heart:  Normal rate  Abdomen: Soft, nontender  : Right flank tenderness  MSK: Warm, no lower extremity edema  Neurologic: Alert and oriented   VITAL SIGNS: 24 HRS MIN & MAX LAST   Temp  Min: 97.5 °F (36.4 °C)  Max: 99 °F (37.2 °C) 98.2 °F (36.8 °C)   BP  Min: 103/63  Max: 128/78 128/78   Pulse  Min: 56  Max: 82  61   Resp  Min: 18  Max: 24 18   SpO2  Min: 88 %  Max: 97 % 96 %     I have reviewed the following labs:  Recent Labs   Lab 06/26/24  0352 06/27/24 0415 06/28/24  0408   WBC 7.20 5.05 4.12*   RBC 3.80* 3.63* 3.63*   HGB 11.4* 11.1* 10.8*   HCT 35.5* 33.6* 33.8*   MCV 93.4 92.6 93.1   MCH 30.0 30.6 29.8   MCHC 32.1* 33.0 32.0*   RDW 13.7 13.6 13.4    163 201   MPV 9.8 10.5* 10.3     Recent Labs   Lab 06/25/24  2040 06/27/24 0415 06/28/24  0408   * 139 142   K 4.1 3.8 4.6    108* 108*   CO2 20* 21* 26   BUN 9.3* 11.5 7.8*   CREATININE 0.95 1.09* 0.92   CALCIUM 8.3* 8.2* 8.3*   ALBUMIN 2.9* 2.5* 2.6*   ALKPHOS 91 75 79   ALT 30 16 18   AST 63* 16 19   BILITOT 0.8 0.3 0.3     Microbiology Results (last 7 days)       Procedure Component Value Units Date/Time    Blood culture #2 **CANNOT BE ORDERED STAT** [[id number]]  (Normal) Collected: 06/25/24 1811    Order Status: Completed Specimen: Blood Updated: 06/28/24 2001     Blood Culture No Growth At 72 Hours    Blood culture #1 **CANNOT BE ORDERED STAT** [[id number]]  (Normal) Collected: 06/25/24 1811    Order Status: Completed Specimen: Blood Updated: 06/28/24 1900     Blood Culture No Growth At 72 Hours    Urine culture [[id number]]  (Abnormal)  (Susceptibility) Collected: 06/25/24 1423    Order Status: Completed Specimen: Urine Updated: 06/27/24 0852     Urine Culture >/= 100,000 colonies/ml Escherichia coli ESBL             See below for Radiology    Scheduled Med:   diltiaZEM  180 mg Oral BID    enoxparin  40 mg  Subcutaneous Q24H (prophylaxis, 1700)    fluticasone furoate-vilanteroL  1 puff Inhalation Daily    And    umeclidinium  1 capsule Inhalation Daily    linaCLOtide  145 mcg Oral Before breakfast    meropenem IV (PEDS and ADULTS)  1 g Intravenous Q12H    pramipexole  0.125 mg Oral TID      Continuous Infusions:     PRN Meds:    Current Facility-Administered Medications:     acetaminophen, 650 mg, Oral, Q4H PRN    albuterol-ipratropium, 3 mL, Nebulization, Q4H PRN    aluminum-magnesium hydroxide-simethicone, 30 mL, Oral, QID PRN    dextrose 10%, 12.5 g, Intravenous, PRN    dextrose 10%, 25 g, Intravenous, PRN    glucagon (human recombinant), 1 mg, Intramuscular, PRN    glucose, 16 g, Oral, PRN    glucose, 24 g, Oral, PRN    melatonin, 6 mg, Oral, Nightly PRN    naloxone, 0.02 mg, Intravenous, PRN    ondansetron, 4 mg, Intravenous, Q6H PRN    oxyCODONE, 5 mg, Oral, Q6H PRN    polyethylene glycol, 17 g, Oral, BID PRN    prochlorperazine, 5 mg, Intravenous, Q6H PRN    senna-docusate 8.6-50 mg, 1 tablet, Oral, BID PRN    sodium chloride 0.9%, 10 mL, Intravenous, Q12H PRN     Assessment/Plan:  Sepsis(fever tachycardia) secondary to right sided pyelonephritis-ESBL E coli  Sleep apnea, uses CPAP intermittently at home    HX:  HTN, asthma, GERD, anxiety disorder ,Solitary right kidney, history of left kidney donation    Patient's T-max 99°  Continue IV meropenem day 3   renal indices stable per recent labs   Supportive care with analgesics, antiemetics  care discussed with patient's nurse, case management    VTE prophylaxis:  Lovenox    Patient condition:  Fair    Anticipated discharge and Disposition:   TBD      _____________________________________________________________________    Nutrition Status:    Radiology:  I have personally reviewed the following imaging and agree with the radiologist.     CT Abdomen Pelvis With IV Contrast NO Oral Contrast  Narrative: EXAMINATION:  CT ABDOMEN PELVIS WITH IV CONTRAST    CLINICAL  HISTORY:  RLQ abdominal pain (Age >= 14y);    TECHNIQUE:  CT imaging was performed of the abdomen and pelvis after the administration of intravenous contrast. Dose length product is 334 mGycm. Automatic exposure control, adjustment of mA/kV or iterative reconstruction technique was used to limit radiation dose.    COMPARISON:  CTA abdomen pelvis dated 06/27/2018    FINDINGS:  Liver: Normal.    Gallbladder and biliary tree: The gallbladder has been surgically resected.  Mild biliary dilatation is likely postoperative.    Pancreas: Normal.    Spleen: Normal.    Adrenals: Normal.    Kidneys and ureters: The left kidney has been surgically resected.  There is mild dilatation of the right renal pelvis with diffuse urothelial enhancement in the renal pelvis and ureter.  There is also heterogeneous cortical enhancement of the right kidney with mild surrounding inflammatory changes.    Bladder: Normal.    Reproductive organs: No pelvic masses.    Stomach/bowel: No evidence of bowel obstruction. Appendix is normal. No discernible bowel inflammation.    Lymph nodes: No pathologically enlarged lymph node identified.    Peritoneum: No ascites or free air. No fluid collection.    Vessels: Moderate vascular calcifications.    Abdominal wall: Normal.    Lung bases: Emphysematous changes in the lung bases.    Bones: No acute osseous findings.  Impression: Findings concerning for right-sided urinary infection and pyelonephritis with mild inflammatory changes.  No drainable fluid collection.    Electronically signed by: Deirdre Farmer  Date:    06/25/2024  Time:    16:55      Madelyn Crews MD  Department of Hospital Medicine   [facility]   06/29/2024

## 2024-06-30 LAB
BACTERIA BLD CULT: NORMAL
BACTERIA BLD CULT: NORMAL

## 2024-06-30 PROCEDURE — 25000242 PHARM REV CODE 250 ALT 637 W/ HCPCS: Performed by: STUDENT IN AN ORGANIZED HEALTH CARE EDUCATION/TRAINING PROGRAM

## 2024-06-30 PROCEDURE — 21400001 HC TELEMETRY ROOM

## 2024-06-30 PROCEDURE — 25000003 PHARM REV CODE 250: Performed by: INTERNAL MEDICINE

## 2024-06-30 PROCEDURE — 25000003 PHARM REV CODE 250

## 2024-06-30 PROCEDURE — 99900031 HC PATIENT EDUCATION (STAT)

## 2024-06-30 PROCEDURE — 94760 N-INVAS EAR/PLS OXIMETRY 1: CPT

## 2024-06-30 PROCEDURE — 99900035 HC TECH TIME PER 15 MIN (STAT)

## 2024-06-30 PROCEDURE — 27000221 HC OXYGEN, UP TO 24 HOURS

## 2024-06-30 PROCEDURE — 25000003 PHARM REV CODE 250: Performed by: STUDENT IN AN ORGANIZED HEALTH CARE EDUCATION/TRAINING PROGRAM

## 2024-06-30 PROCEDURE — 63600175 PHARM REV CODE 636 W HCPCS: Performed by: INTERNAL MEDICINE

## 2024-06-30 RX ADMIN — UMECLIDINIUM 62.5 MCG: 62.5 AEROSOL, POWDER ORAL at 08:06

## 2024-06-30 RX ADMIN — ENOXAPARIN SODIUM 40 MG: 40 INJECTION SUBCUTANEOUS at 04:06

## 2024-06-30 RX ADMIN — DILTIAZEM HYDROCHLORIDE 180 MG: 180 CAPSULE, COATED, EXTENDED RELEASE ORAL at 08:06

## 2024-06-30 RX ADMIN — PRAMIPEXOLE DIHYDROCHLORIDE 0.12 MG: 0.12 TABLET ORAL at 08:06

## 2024-06-30 RX ADMIN — LINACLOTIDE 145 MCG: 145 CAPSULE, GELATIN COATED ORAL at 05:06

## 2024-06-30 RX ADMIN — FLUTICASONE FUROATE AND VILANTEROL TRIFENATATE 1 PUFF: 200; 25 POWDER RESPIRATORY (INHALATION) at 08:06

## 2024-06-30 RX ADMIN — SENNOSIDES AND DOCUSATE SODIUM 1 TABLET: 50; 8.6 TABLET ORAL at 11:06

## 2024-06-30 RX ADMIN — POLYETHYLENE GLYCOL 3350 17 G: 17 POWDER, FOR SOLUTION ORAL at 11:06

## 2024-06-30 RX ADMIN — MEROPENEM 1 G: 1 INJECTION, POWDER, FOR SOLUTION INTRAVENOUS at 01:06

## 2024-06-30 RX ADMIN — OXYCODONE HYDROCHLORIDE 5 MG: 5 TABLET ORAL at 04:06

## 2024-06-30 RX ADMIN — PRAMIPEXOLE DIHYDROCHLORIDE 0.12 MG: 0.12 TABLET ORAL at 03:06

## 2024-06-30 RX ADMIN — OXYCODONE HYDROCHLORIDE 5 MG: 5 TABLET ORAL at 11:06

## 2024-06-30 RX ADMIN — OXYCODONE HYDROCHLORIDE 5 MG: 5 TABLET ORAL at 07:06

## 2024-06-30 RX ADMIN — MEROPENEM 1 G: 1 INJECTION, POWDER, FOR SOLUTION INTRAVENOUS at 12:06

## 2024-06-30 NOTE — PROGRESS NOTES
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  Patient reported no known inciting incident, trauma to the abdomen/flank.  Patient reports that the pain feels constant, worsening, 10/10, nonradiating, and described as sharp/stabbing.  Patient originally went to urgent care and after her vitals were taken and seeing that she had a fever of 103 and a blood pressure of 150/86 she was sent to the ED. Patient denies any exacerbating or alleviating factors, treatments tried at home, history of similar episodes, dysuria, hematuria, chest pain, shortness of breath, AMS/LOC.       ED course:  Vital signs significant for temp max of 103.3, , respirations 24.  Episodes of mild hypertension but otherwise normotensive.  CBC insignificant.  BMP shows a BUN of 9.6 and a creatinine of 1.16.  Lactic acid 4.1.  UA shows evident UTI hematuria.  CT abdomen/pelvis with IV contrast shows a right-sided UTI and pyelonephritis with mild inflammatory changes but with no drainable fluid collection.  Since arrival fever initially went down to 99.6 in the most recently spiked back up to 103.3.  ED treated the patient with 2 L of LR bolus, IV Zofran, IV Dilaudid, Tylenol, and Rocephin 1 g IV every 24 hours.      admited the patient for Sepsis secondary to pyelonephritis.      Interval Hx:   No acute events reported overnight.  Seen at bedside this morning, flank pain present but improving, reported constipation  Tolerating p.o.  Hemodynamically stable no fever spikes, T-max 98.3°    Objective/physical exam:  General: In no acute  distress, afebrile  Chest:  Unlabored breathing on room air  Heart:  Normal rate  Abdomen: Soft, nontender  : Right flank tenderness  MSK: Warm, no lower extremity edema  Neurologic: Alert and oriented   VITAL SIGNS: 24 HRS MIN & MAX LAST   Temp  Min: 97.6 °F (36.4 °C)  Max: 98.3 °F (36.8 °C) 98.1 °F (36.7 °C)   BP  Min: 101/67  Max: 144/86 121/80   Pulse  Min: 60  Max: 167  60   Resp  Min: 17  Max: 20 18   SpO2  Min: 94 %  Max: 97 % 97 %     I have reviewed the following labs:  Recent Labs   Lab 06/26/24  0352 06/27/24  0415 06/28/24  0408   WBC 7.20 5.05 4.12*   RBC 3.80* 3.63* 3.63*   HGB 11.4* 11.1* 10.8*   HCT 35.5* 33.6* 33.8*   MCV 93.4 92.6 93.1   MCH 30.0 30.6 29.8   MCHC 32.1* 33.0 32.0*   RDW 13.7 13.6 13.4    163 201   MPV 9.8 10.5* 10.3     Recent Labs   Lab 06/25/24  2040 06/27/24  0415 06/28/24  0408   * 139 142   K 4.1 3.8 4.6    108* 108*   CO2 20* 21* 26   BUN 9.3* 11.5 7.8*   CREATININE 0.95 1.09* 0.92   CALCIUM 8.3* 8.2* 8.3*   ALBUMIN 2.9* 2.5* 2.6*   ALKPHOS 91 75 79   ALT 30 16 18   AST 63* 16 19   BILITOT 0.8 0.3 0.3     Microbiology Results (last 7 days)       Procedure Component Value Units Date/Time    Blood culture #2 **CANNOT BE ORDERED STAT** [[id number]]  (Normal) Collected: 06/25/24 1811    Order Status: Completed Specimen: Blood Updated: 06/29/24 2001     Blood Culture No Growth At 96 Hours    Blood culture #1 **CANNOT BE ORDERED STAT** [[id number]]  (Normal) Collected: 06/25/24 1811    Order Status: Completed Specimen: Blood Updated: 06/29/24 1900     Blood Culture No Growth At 96 Hours    Urine culture [[id number]]  (Abnormal)  (Susceptibility) Collected: 06/25/24 1423    Order Status: Completed Specimen: Urine Updated: 06/27/24 0852     Urine Culture >/= 100,000 colonies/ml Escherichia coli ESBL             See below for Radiology    Scheduled Med:   diltiaZEM  180 mg Oral BID    enoxparin  40 mg Subcutaneous Q24H (prophylaxis, 1700)    fluticasone  furoate-vilanteroL  1 puff Inhalation Daily    And    umeclidinium  1 capsule Inhalation Daily    linaCLOtide  145 mcg Oral Before breakfast    meropenem IV (PEDS and ADULTS)  1 g Intravenous Q12H    pramipexole  0.125 mg Oral TID      Continuous Infusions:     PRN Meds:    Current Facility-Administered Medications:     acetaminophen, 650 mg, Oral, Q4H PRN    albuterol-ipratropium, 3 mL, Nebulization, Q4H PRN    aluminum-magnesium hydroxide-simethicone, 30 mL, Oral, QID PRN    dextrose 10%, 12.5 g, Intravenous, PRN    dextrose 10%, 25 g, Intravenous, PRN    glucagon (human recombinant), 1 mg, Intramuscular, PRN    glucose, 16 g, Oral, PRN    glucose, 24 g, Oral, PRN    melatonin, 6 mg, Oral, Nightly PRN    naloxone, 0.02 mg, Intravenous, PRN    ondansetron, 4 mg, Intravenous, Q6H PRN    oxyCODONE, 5 mg, Oral, Q6H PRN    polyethylene glycol, 17 g, Oral, BID PRN    prochlorperazine, 5 mg, Intravenous, Q6H PRN    senna-docusate 8.6-50 mg, 1 tablet, Oral, BID PRN    sodium chloride 0.9%, 10 mL, Intravenous, Q12H PRN     Assessment/Plan:  Sepsis(fever tachycardia) secondary to right sided pyelonephritis-ESBL E coli  Sleep apnea, uses CPAP intermittently at home    HX:  HTN, asthma, GERD, anxiety disorder ,Solitary right kidney, history of left kidney donation    Patient improving  Continue IV meropenem day 4   Continue Supportive care with analgesics, antiemetics  Monitor bowel movements, continue stool softeners laxatives  care discussed with patient's nurse  Check labs in a.m.    VTE prophylaxis:  Lovenox    Patient condition:  Fair    Anticipated discharge and Disposition:  Home hopefully in next 24 hours      _____________________________________________________________________    Nutrition Status:    Radiology:  I have personally reviewed the following imaging and agree with the radiologist.     CT Abdomen Pelvis With IV Contrast NO Oral Contrast  Narrative: EXAMINATION:  CT ABDOMEN PELVIS WITH IV  CONTRAST    CLINICAL HISTORY:  RLQ abdominal pain (Age >= 14y);    TECHNIQUE:  CT imaging was performed of the abdomen and pelvis after the administration of intravenous contrast. Dose length product is 334 mGycm. Automatic exposure control, adjustment of mA/kV or iterative reconstruction technique was used to limit radiation dose.    COMPARISON:  CTA abdomen pelvis dated 06/27/2018    FINDINGS:  Liver: Normal.    Gallbladder and biliary tree: The gallbladder has been surgically resected.  Mild biliary dilatation is likely postoperative.    Pancreas: Normal.    Spleen: Normal.    Adrenals: Normal.    Kidneys and ureters: The left kidney has been surgically resected.  There is mild dilatation of the right renal pelvis with diffuse urothelial enhancement in the renal pelvis and ureter.  There is also heterogeneous cortical enhancement of the right kidney with mild surrounding inflammatory changes.    Bladder: Normal.    Reproductive organs: No pelvic masses.    Stomach/bowel: No evidence of bowel obstruction. Appendix is normal. No discernible bowel inflammation.    Lymph nodes: No pathologically enlarged lymph node identified.    Peritoneum: No ascites or free air. No fluid collection.    Vessels: Moderate vascular calcifications.    Abdominal wall: Normal.    Lung bases: Emphysematous changes in the lung bases.    Bones: No acute osseous findings.  Impression: Findings concerning for right-sided urinary infection and pyelonephritis with mild inflammatory changes.  No drainable fluid collection.    Electronically signed by: Deirdre Farmer  Date:    06/25/2024  Time:    16:55      Madelyn Crews MD  Department of Hospital Medicine   [facility]   06/30/2024

## 2024-07-01 VITALS
RESPIRATION RATE: 16 BRPM | SYSTOLIC BLOOD PRESSURE: 119 MMHG | TEMPERATURE: 98 F | DIASTOLIC BLOOD PRESSURE: 83 MMHG | HEART RATE: 62 BPM | BODY MASS INDEX: 24.75 KG/M2 | OXYGEN SATURATION: 97 % | HEIGHT: 64 IN | WEIGHT: 145 LBS

## 2024-07-01 PROBLEM — N12 PYELONEPHRITIS: Status: ACTIVE | Noted: 2024-07-01

## 2024-07-01 LAB
ANION GAP SERPL CALC-SCNC: 8 MEQ/L
BASOPHILS # BLD AUTO: 0.02 X10(3)/MCL
BASOPHILS NFR BLD AUTO: 0.4 %
BUN SERPL-MCNC: 10.8 MG/DL (ref 9.8–20.1)
CALCIUM SERPL-MCNC: 9.2 MG/DL (ref 8.4–10.2)
CHLORIDE SERPL-SCNC: 106 MMOL/L (ref 98–107)
CO2 SERPL-SCNC: 29 MMOL/L (ref 23–31)
CREAT SERPL-MCNC: 0.96 MG/DL (ref 0.55–1.02)
CREAT/UREA NIT SERPL: 11
EOSINOPHIL # BLD AUTO: 0.18 X10(3)/MCL (ref 0–0.9)
EOSINOPHIL NFR BLD AUTO: 3.5 %
ERYTHROCYTE [DISTWIDTH] IN BLOOD BY AUTOMATED COUNT: 13.3 % (ref 11.5–17)
GFR SERPLBLD CREATININE-BSD FMLA CKD-EPI: >60 ML/MIN/1.73/M2
GLUCOSE SERPL-MCNC: 96 MG/DL (ref 82–115)
HCT VFR BLD AUTO: 40.6 % (ref 37–47)
HGB BLD-MCNC: 12.8 G/DL (ref 12–16)
IMM GRANULOCYTES # BLD AUTO: 0.07 X10(3)/MCL (ref 0–0.04)
IMM GRANULOCYTES NFR BLD AUTO: 1.4 %
LYMPHOCYTES # BLD AUTO: 1.83 X10(3)/MCL (ref 0.6–4.6)
LYMPHOCYTES NFR BLD AUTO: 35.5 %
MCH RBC QN AUTO: 29.8 PG (ref 27–31)
MCHC RBC AUTO-ENTMCNC: 31.5 G/DL (ref 33–36)
MCV RBC AUTO: 94.4 FL (ref 80–94)
MONOCYTES # BLD AUTO: 0.44 X10(3)/MCL (ref 0.1–1.3)
MONOCYTES NFR BLD AUTO: 8.5 %
NEUTROPHILS # BLD AUTO: 2.62 X10(3)/MCL (ref 2.1–9.2)
NEUTROPHILS NFR BLD AUTO: 50.7 %
NRBC BLD AUTO-RTO: 0 %
PLATELET # BLD AUTO: 336 X10(3)/MCL (ref 130–400)
PMV BLD AUTO: 9.3 FL (ref 7.4–10.4)
POTASSIUM SERPL-SCNC: 4.5 MMOL/L (ref 3.5–5.1)
RBC # BLD AUTO: 4.3 X10(6)/MCL (ref 4.2–5.4)
SODIUM SERPL-SCNC: 143 MMOL/L (ref 136–145)
WBC # BLD AUTO: 5.16 X10(3)/MCL (ref 4.5–11.5)

## 2024-07-01 PROCEDURE — 25000003 PHARM REV CODE 250: Performed by: STUDENT IN AN ORGANIZED HEALTH CARE EDUCATION/TRAINING PROGRAM

## 2024-07-01 PROCEDURE — 25000242 PHARM REV CODE 250 ALT 637 W/ HCPCS: Performed by: STUDENT IN AN ORGANIZED HEALTH CARE EDUCATION/TRAINING PROGRAM

## 2024-07-01 PROCEDURE — 25000003 PHARM REV CODE 250: Performed by: INTERNAL MEDICINE

## 2024-07-01 PROCEDURE — 80048 BASIC METABOLIC PNL TOTAL CA: CPT | Performed by: INTERNAL MEDICINE

## 2024-07-01 PROCEDURE — 36415 COLL VENOUS BLD VENIPUNCTURE: CPT | Performed by: INTERNAL MEDICINE

## 2024-07-01 PROCEDURE — 85025 COMPLETE CBC W/AUTO DIFF WBC: CPT | Performed by: INTERNAL MEDICINE

## 2024-07-01 PROCEDURE — 63600175 PHARM REV CODE 636 W HCPCS: Performed by: INTERNAL MEDICINE

## 2024-07-01 RX ORDER — NITROFURANTOIN 25; 75 MG/1; MG/1
100 CAPSULE ORAL 2 TIMES DAILY
Qty: 20 CAPSULE | Refills: 0 | Status: SHIPPED | OUTPATIENT
Start: 2024-07-01 | End: 2024-07-11

## 2024-07-01 RX ADMIN — MEROPENEM 1 G: 1 INJECTION, POWDER, FOR SOLUTION INTRAVENOUS at 12:07

## 2024-07-01 RX ADMIN — PRAMIPEXOLE DIHYDROCHLORIDE 0.12 MG: 0.12 TABLET ORAL at 09:07

## 2024-07-01 RX ADMIN — LINACLOTIDE 145 MCG: 145 CAPSULE, GELATIN COATED ORAL at 06:07

## 2024-07-01 RX ADMIN — FLUTICASONE FUROATE AND VILANTEROL TRIFENATATE 1 PUFF: 200; 25 POWDER RESPIRATORY (INHALATION) at 09:07

## 2024-07-01 RX ADMIN — DILTIAZEM HYDROCHLORIDE 180 MG: 180 CAPSULE, COATED, EXTENDED RELEASE ORAL at 09:07

## 2024-07-01 RX ADMIN — UMECLIDINIUM 62.5 MCG: 62.5 AEROSOL, POWDER ORAL at 09:07

## 2024-07-01 RX ADMIN — OXYCODONE HYDROCHLORIDE 5 MG: 5 TABLET ORAL at 07:07

## 2024-07-01 NOTE — PLAN OF CARE
Problem: Infection  Goal: Absence of Infection Signs and Symptoms  Outcome: Met     Problem: Adult Inpatient Plan of Care  Goal: Plan of Care Review  Outcome: Met  Goal: Patient-Specific Goal (Individualized)  Outcome: Met  Goal: Absence of Hospital-Acquired Illness or Injury  Outcome: Met  Goal: Optimal Comfort and Wellbeing  Outcome: Met  Goal: Readiness for Transition of Care  Outcome: Met     Problem: UTI (Urinary Tract Infection)  Goal: Improved Infection Symptoms  Outcome: Met

## 2024-07-01 NOTE — PLAN OF CARE
07/01/24 1052   Final Note   Assessment Type Final Discharge Note   Anticipated Discharge Disposition Home   Hospital Resources/Appts/Education Provided Post-Acute resouces added to AVS   Post-Acute Status   Discharge Delays None known at this time

## 2024-07-01 NOTE — DISCHARGE SUMMARY
[facility] Medicine Discharge Summary    Admit Date: 6/25/2024  Discharge Date and Time: 7/1/20249:21 AM  Admitting Physician: ESTELA Team  Discharging Physician: Madelyn Crews MD.  Primary Care Physician: Fish Courtney MD  Consults: None    Discharge Diagnoses:  Sepsis(fever tachycardia) secondary to right sided pyelonephritis-ESBL E coli       HX:  HTN, asthma, GERD, anxiety disorder ,Solitary right kidney, history of left kidney donation,Sleep apnea, uses CPAP intermittently at home    Hospital Course:   64-year-old female with a PmHx of a unilateral kidney (right) from kidney donation, HTN , asthma, GERD, generalized anxiety disorder, and seasonal allergies presented to the ED with acute onset severe body aches, right-sided abdominal pain and right flank pain, fever, chills, headache, nausea, vomiting approximately 3 days ago.  Patient reported no known inciting incident, trauma to the abdomen/flank.  Patient reports that the pain feels constant, worsening, 10/10, nonradiating, and described as sharp/stabbing.  Patient originally went to urgent care and after her vitals were taken and seeing that she had a fever of 103 and a blood pressure of 150/86 she was sent to the ED. Patient denies any exacerbating or alleviating factors, treatments tried at home, history of similar episodes, dysuria, hematuria, chest pain, shortness of breath, AMS/LOC.       ED course:  Vital signs significant for temp max of 103.3, , respirations 24.  Episodes of mild hypertension but otherwise normotensive.  CBC insignificant.  BMP shows a BUN of 9.6 and a creatinine of 1.16.  Lactic acid 4.1.  UA shows evident UTI hematuria.  CT abdomen/pelvis with IV contrast shows a right-sided UTI and pyelonephritis with mild inflammatory changes but with no drainable fluid collection.    ED treated the patient with 2 L of LR bolus, IV Zofran, IV Dilaudid, Tylenol, and Rocephin 1 g IV every 24  hours. Pt was admitted to  service.    Urine cultures resulted ESBL E coli, abx switched from ceftriaxone to meropenem, fevers resolved, pt's symptoms improved.     Pt was seen and examined on the day of discharge,remained stable, discharged to home, recommended follow up with PCP, prescriptions sent .      Vitals:  VITAL SIGNS: 24 HRS MIN & MAX LAST   Temp  Min: 97.5 °F (36.4 °C)  Max: 98.3 °F (36.8 °C) 97.7 °F (36.5 °C)   BP  Min: 113/74  Max: 125/81 119/83   Pulse  Min: 59  Max: 69  62   Resp  Min: 16  Max: 18 16   SpO2  Min: 95 %  Max: 97 % 97 %       Physical Exam:  General: In no acute distress, afebrile  Chest:  Unlabored breathing on room air  Heart:  Normal rate  Abdomen: Soft, nontender  MSK: Warm, no lower extremity edema  Neurologic: Alert and oriented     Procedures Performed: No admission procedures for hospital encounter.     Significant Diagnostic Studies: See Full reports for all details    Recent Labs   Lab 06/27/24 0415 06/28/24 0408 07/01/24  0541   WBC 5.05 4.12* 5.16   RBC 3.63* 3.63* 4.30   HGB 11.1* 10.8* 12.8   HCT 33.6* 33.8* 40.6   MCV 92.6 93.1 94.4*   MCH 30.6 29.8 29.8   MCHC 33.0 32.0* 31.5*   RDW 13.6 13.4 13.3    201 336   MPV 10.5* 10.3 9.3       Recent Labs   Lab 06/25/24 2040 06/27/24 0415 06/28/24 0408 07/01/24  0541   * 139 142 143   K 4.1 3.8 4.6 4.5    108* 108* 106   CO2 20* 21* 26 29   BUN 9.3* 11.5 7.8* 10.8   CREATININE 0.95 1.09* 0.92 0.96   CALCIUM 8.3* 8.2* 8.3* 9.2   ALBUMIN 2.9* 2.5* 2.6*  --    ALKPHOS 91 75 79  --    ALT 30 16 18  --    AST 63* 16 19  --    BILITOT 0.8 0.3 0.3  --         Microbiology Results (last 7 days)       Procedure Component Value Units Date/Time    Blood culture #2 **CANNOT BE ORDERED STAT** [[id number]]  (Normal) Collected: 06/25/24 1811    Order Status: Completed Specimen: Blood Updated: 06/30/24 2001     Blood Culture No Growth at 5 days    Blood culture #1 **CANNOT BE ORDERED STAT** [[id number]]  (Normal)  Collected: 06/25/24 1811    Order Status: Completed Specimen: Blood Updated: 06/30/24 1901     Blood Culture No Growth at 5 days    Urine culture [[id number]]  (Abnormal)  (Susceptibility) Collected: 06/25/24 1423    Order Status: Completed Specimen: Urine Updated: 06/27/24 0852     Urine Culture >/= 100,000 colonies/ml Escherichia coli ESBL             CT Abdomen Pelvis With IV Contrast NO Oral Contrast  Narrative: EXAMINATION:  CT ABDOMEN PELVIS WITH IV CONTRAST    CLINICAL HISTORY:  RLQ abdominal pain (Age >= 14y);    TECHNIQUE:  CT imaging was performed of the abdomen and pelvis after the administration of intravenous contrast. Dose length product is 334 mGycm. Automatic exposure control, adjustment of mA/kV or iterative reconstruction technique was used to limit radiation dose.    COMPARISON:  CTA abdomen pelvis dated 06/27/2018    FINDINGS:  Liver: Normal.    Gallbladder and biliary tree: The gallbladder has been surgically resected.  Mild biliary dilatation is likely postoperative.    Pancreas: Normal.    Spleen: Normal.    Adrenals: Normal.    Kidneys and ureters: The left kidney has been surgically resected.  There is mild dilatation of the right renal pelvis with diffuse urothelial enhancement in the renal pelvis and ureter.  There is also heterogeneous cortical enhancement of the right kidney with mild surrounding inflammatory changes.    Bladder: Normal.    Reproductive organs: No pelvic masses.    Stomach/bowel: No evidence of bowel obstruction. Appendix is normal. No discernible bowel inflammation.    Lymph nodes: No pathologically enlarged lymph node identified.    Peritoneum: No ascites or free air. No fluid collection.    Vessels: Moderate vascular calcifications.    Abdominal wall: Normal.    Lung bases: Emphysematous changes in the lung bases.    Bones: No acute osseous findings.  Impression: Findings concerning for right-sided urinary infection and pyelonephritis with mild inflammatory changes.   No drainable fluid collection.    Electronically signed by: Deirdre Farmer  Date:    06/25/2024  Time:    16:55         Medication List        START taking these medications      nitrofurantoin (macrocrystal-monohydrate) 100 MG capsule  Commonly known as: MACROBID  Take 1 capsule (100 mg total) by mouth 2 (two) times daily. for 10 days            CONTINUE taking these medications      albuterol 90 mcg/actuation inhaler  Commonly known as: PROVENTIL/VENTOLIN HFA     aspirin 81 MG EC tablet  Commonly known as: ECOTRIN     compress.stocking,knee,reg,med Misc  18-20mmHG  Knee high compression stocking bilateral; use daily as directed  DX: Bilateral Lymphedema  NPI [id number]  DEMETRICE 12 mth     diltiaZEM 180 MG 24 hr capsule  Commonly known as: CARDIZEM CD  Take 1 capsule (180 mg total) by mouth 2 (two) times a day.     hydrocortisone 25 mg suppository  Commonly known as: ANUSOL-HC  INSERT ONE SUPPOSITORY RECTALLY AS NEEDED FOR HEMORRHOIDS     levocetirizine 5 MG tablet  Commonly known as: XYZAL  Take 1 tablet (5 mg total) by mouth every evening.     LINZESS 145 mcg Cap capsule  Generic drug: linaCLOtide     montelukast 10 mg tablet  Commonly known as: SINGULAIR  Take 1 tablet (10 mg total) by mouth every evening.     omeprazole 40 MG capsule  Commonly known as: PRILOSEC  TAKE ONE CAPSULE BY MOUTH ONCE DAILY     ondansetron 8 MG Tbdl  Commonly known as: ZOFRAN-ODT  Take 1 tablet (8 mg total) by mouth every 8 (eight) hours. PRN nausea     pramipexole 0.125 MG tablet  Commonly known as: MIRAPEX  Take 1 tablet (0.125 mg total) by mouth 3 (three) times daily.     sucralfate 1 gram tablet  Commonly known as: CARAFATE     TRELEGY ELLIPTA 200-62.5-25 mcg inhaler  Generic drug: fluticasone-umeclidin-vilanter            STOP taking these medications      bisacodyL 5 mg EC tablet  Commonly known as: DULCOLAX     buPROPion 150 MG TBSR 12 hr tablet  Commonly known as: WELLBUTRIN SR               Where to Get Your Medications         These medications were sent to [facility] [place] - [address]  [address] [place] [address]      Phone: [phone]   nitrofurantoin (macrocrystal-monohydrate) 100 MG capsule          Explained in detail to the patient about the discharge plan, medications, and follow-up visits. Pt understands and agrees with the treatment plan  Discharge Disposition: Home or Self Care   Discharged Condition: stable  Diet-   Dietary Orders (From admission, onward)       Start     Ordered    06/26/24 0910  Diet Adult Regular  Diet effective now         06/26/24 0909                   Medications Per DC med rec  Activities as tolerated   Follow-up Information       Fish Courtney MD Follow up.    Specialty: Family Medicine  Contact information:  [address] [place] [address] [phone]                           For further questions contact hospitalist office    Discharge time 33 minutes    For worsening symptoms, chest pain, shortness of breath, increased abdominal pain, high grade fever, stroke or stroke like symptoms, immediately go to the nearest Emergency Room or call 911 as soon as possible.      Madelyn Clarke M.D on 7/1/2024. at 9:21 AM.

## 2024-07-01 NOTE — PLAN OF CARE
Problem: Adult Inpatient Plan of Care  Goal: Plan of Care Review  Outcome: Progressing  Flowsheets (Taken 6/30/2024 1936)  Plan of Care Reviewed With: patient  Goal: Patient-Specific Goal (Individualized)  Outcome: Progressing  Goal: Absence of Hospital-Acquired Illness or Injury  Outcome: Progressing  Intervention: Identify and Manage Fall Risk  Flowsheets (Taken 6/30/2024 1936)  Safety Promotion/Fall Prevention:   assistive device/personal item within reach   medications reviewed   nonskid shoes/socks when out of bed  Intervention: Prevent Skin Injury  Flowsheets (Taken 6/30/2024 1936)  Body Position: position changed independently  Device Skin Pressure Protection: adhesive use limited  Intervention: Prevent and Manage VTE (Venous Thromboembolism) Risk  Flowsheets (Taken 6/30/2024 1936)  VTE Prevention/Management:   ambulation promoted   bleeding risk assessed   ROM (active) performed   ROM (passive) performed   fluids promoted  Intervention: Prevent Infection  Flowsheets (Taken 6/30/2024 1936)  Infection Prevention:   rest/sleep promoted   hand hygiene promoted   single patient room provided  Goal: Optimal Comfort and Wellbeing  Outcome: Progressing  Intervention: Monitor Pain and Promote Comfort  Flowsheets (Taken 6/30/2024 1936)  Pain Management Interventions:   care clustered   pain management plan reviewed with patient/caregiver  Intervention: Provide Person-Centered Care  Flowsheets (Taken 6/30/2024 1936)  Trust Relationship/Rapport:   care explained   choices provided   questions answered  Goal: Readiness for Transition of Care  Outcome: Progressing

## 2024-07-02 ENCOUNTER — PATIENT OUTREACH (OUTPATIENT)
Dept: ADMINISTRATIVE | Facility: CLINIC | Age: 64
End: 2024-07-02
Payer: COMMERCIAL

## 2024-07-02 RX ORDER — ACETAMINOPHEN 500 MG
1000 TABLET ORAL
COMMUNITY

## 2024-07-02 NOTE — PROGRESS NOTES
C3 nurse spoke with Thea Major  for a TCC post hospital discharge follow up call. The patient has a scheduled [facility] appointment with Fish Courtney MD  on 07/11/2024 @ 2 pm.

## 2024-07-03 ENCOUNTER — PATIENT MESSAGE (OUTPATIENT)
Dept: ADMINISTRATIVE | Facility: OTHER | Age: 64
End: 2024-07-03
Payer: COMMERCIAL

## 2024-07-06 ENCOUNTER — PATIENT MESSAGE (OUTPATIENT)
Dept: ADMINISTRATIVE | Facility: OTHER | Age: 64
End: 2024-07-06
Payer: COMMERCIAL

## 2024-07-16 PROCEDURE — 85730 THROMBOPLASTIN TIME PARTIAL: CPT | Performed by: FAMILY MEDICINE

## 2024-07-16 PROCEDURE — 85610 PROTHROMBIN TIME: CPT | Performed by: FAMILY MEDICINE

## 2024-09-23 PROCEDURE — 87077 CULTURE AEROBIC IDENTIFY: CPT | Performed by: NURSE PRACTITIONER

## 2024-09-30 PROBLEM — N12 PYELONEPHRITIS: Status: RESOLVED | Noted: 2024-07-01 | Resolved: 2024-09-30

## 2024-11-06 DIAGNOSIS — R14.0 ABDOMINAL BLOATING: ICD-10-CM

## 2024-11-06 DIAGNOSIS — R11.2 NAUSEA WITH VOMITING: ICD-10-CM

## 2024-11-06 DIAGNOSIS — K59.09 CHRONIC CONSTIPATION: Primary | ICD-10-CM

## 2024-11-20 ENCOUNTER — HOSPITAL ENCOUNTER (OUTPATIENT)
Dept: RADIOLOGY | Facility: HOSPITAL | Age: 64
Discharge: HOME | End: 2024-11-20
Attending: PHYSICIAN ASSISTANT
Payer: COMMERCIAL

## 2024-11-20 DIAGNOSIS — R14.0 ABDOMINAL BLOATING: ICD-10-CM

## 2024-11-20 DIAGNOSIS — R11.2 NAUSEA WITH VOMITING: ICD-10-CM

## 2024-11-20 DIAGNOSIS — K59.09 CHRONIC CONSTIPATION: ICD-10-CM

## 2024-11-20 PROCEDURE — A9541 TC99M SULFUR COLLOID: HCPCS | Performed by: PHYSICIAN ASSISTANT

## 2024-11-20 PROCEDURE — 78264 GASTRIC EMPTYING IMG STUDY: CPT | Mod: TC

## 2024-11-20 RX ORDER — TECHNETIUM TC 99M SULFUR COLLOID 2 MG
2 KIT MISCELLANEOUS
Status: COMPLETED | OUTPATIENT
Start: 2024-11-20 | End: 2024-11-20

## 2024-11-20 RX ADMIN — TECHNETIUM TC 99M SULFUR COLLOID 2.1 MILLICURIE: KIT at 07:11

## (undated) DEVICE — SUT 2-0 12-18IN SILK

## (undated) DEVICE — TRAY MINOR GEN SURG

## (undated) DEVICE — HOLDER TUBE

## (undated) DEVICE — SEE MEDLINE ITEM 146417

## (undated) DEVICE — COVER LIGHT HANDLE 80/CA

## (undated) DEVICE — APPLIER CLIP ENDO MED/LG 10MM

## (undated) DEVICE — SCISSOR 5MMX35CM DIRECT DRIVE

## (undated) DEVICE — TROCAR ENDOPATH XCEL 12X100MM

## (undated) DEVICE — SPONGE LAP 18X18 PREWASHED

## (undated) DEVICE — SOL NS 1000CC

## (undated) DEVICE — SUT 1 36IN PDS II VIO MONO

## (undated) DEVICE — DRAPE SLUSH WARMER WITH DISC

## (undated) DEVICE — KIT GELPORT LAPAROSCOPIC ABD

## (undated) DEVICE — APPLICATOR CHLORAPREP ORN 26ML

## (undated) DEVICE — DRAPE ABDOMINAL TIBURON 14X11

## (undated) DEVICE — SUT PROLENE 6-0 BV-1 30IN

## (undated) DEVICE — GOWN SURGICAL X-LARGE

## (undated) DEVICE — SEE MEDLINE ITEM 152622

## (undated) DEVICE — IRRIGATOR ENDOSCOPY DISP.

## (undated) DEVICE — STAPLER SKIN ROTATING HEAD

## (undated) DEVICE — SET DECANTER MEDICHOICE

## (undated) DEVICE — SUT 3-0 12-18IN SILK

## (undated) DEVICE — DRAPE INCISE IOBAN 2 23X17IN

## (undated) DEVICE — CLIP SPRING 6MM

## (undated) DEVICE — DRAPE BAG ISOLATION 20 X 20

## (undated) DEVICE — TRAY FOLEY 16FR INFECTION CONT

## (undated) DEVICE — ADHESIVE DERMABOND ADVANCED

## (undated) DEVICE — DRAPE SCOPE PILLOW WARMER

## (undated) DEVICE — SUT MCRYL PLUS 4-0 PS2 27IN

## (undated) DEVICE — SUT 4-0 12-18IN SILK BLACK

## (undated) DEVICE — SUT 2/0 30IN SILK BLK BRAI

## (undated) DEVICE — TROCAR SPACEMAKER BLUNT 12MM

## (undated) DEVICE — SYR 10CC LUER LOCK

## (undated) DEVICE — SYR 30CC LUER LOCK

## (undated) DEVICE — SUT EASE CROSSBOW CLSR SYS

## (undated) DEVICE — CART STAPLE RELD 45MM WHT

## (undated) DEVICE — COVER LIGHT HANDLE

## (undated) DEVICE — TROCAR ENDOPATH XCEL 5X100MM

## (undated) DEVICE — BLADE SURG CARBON STEEL SZ11

## (undated) DEVICE — SET IRR URLGY 2LINE UNIV SPIKE

## (undated) DEVICE — ELECTRODE REM PLYHSV RETURN 9

## (undated) DEVICE — KIT ROBOTIC 3 ARM DA VINCI SI

## (undated) DEVICE — CORD BIPOLAR 12 FOOT

## (undated) DEVICE — KIT ANTIFOG

## (undated) DEVICE — STAPLER INT LINEAR ARTC 3.5-45

## (undated) DEVICE — PACK SET UP CONVERTORS

## (undated) DEVICE — NDL 18GA X1 1/2 REG BEVEL